# Patient Record
Sex: FEMALE | Race: WHITE | NOT HISPANIC OR LATINO | URBAN - METROPOLITAN AREA
[De-identification: names, ages, dates, MRNs, and addresses within clinical notes are randomized per-mention and may not be internally consistent; named-entity substitution may affect disease eponyms.]

---

## 2017-01-03 ENCOUNTER — CHARTING TRANS (OUTPATIENT)
Dept: URGENT CARE | Age: 57
End: 2017-01-03

## 2017-01-03 ENCOUNTER — MYAURORA ACCOUNT LINK (OUTPATIENT)
Dept: OTHER | Age: 57
End: 2017-01-03

## 2017-01-05 ENCOUNTER — CHARTING TRANS (OUTPATIENT)
Dept: INTERNAL MEDICINE | Age: 57
End: 2017-01-05

## 2017-01-05 ENCOUNTER — MYAURORA ACCOUNT LINK (OUTPATIENT)
Dept: OTHER | Age: 57
End: 2017-01-05

## 2017-01-05 ENCOUNTER — CHARTING TRANS (OUTPATIENT)
Dept: OTHER | Age: 57
End: 2017-01-05

## 2017-01-23 ENCOUNTER — CHARTING TRANS (OUTPATIENT)
Dept: OTHER | Age: 57
End: 2017-01-23

## 2017-01-24 ENCOUNTER — CHARTING TRANS (OUTPATIENT)
Dept: INTERNAL MEDICINE | Age: 57
End: 2017-01-24

## 2017-01-25 ENCOUNTER — CHARTING TRANS (OUTPATIENT)
Dept: OTHER | Age: 57
End: 2017-01-25

## 2017-02-21 ENCOUNTER — CHARTING TRANS (OUTPATIENT)
Dept: OTHER | Age: 57
End: 2017-02-21

## 2017-02-23 ENCOUNTER — CHARTING TRANS (OUTPATIENT)
Dept: OTHER | Age: 57
End: 2017-02-23

## 2017-02-27 ENCOUNTER — CHARTING TRANS (OUTPATIENT)
Dept: OTHER | Age: 57
End: 2017-02-27

## 2017-03-02 ENCOUNTER — CHARTING TRANS (OUTPATIENT)
Dept: OTHER | Age: 57
End: 2017-03-02

## 2017-03-03 ENCOUNTER — CHARTING TRANS (OUTPATIENT)
Dept: OTHER | Age: 57
End: 2017-03-03

## 2017-03-07 ENCOUNTER — CHARTING TRANS (OUTPATIENT)
Dept: OTHER | Age: 57
End: 2017-03-07

## 2017-03-07 ENCOUNTER — MYAURORA ACCOUNT LINK (OUTPATIENT)
Dept: OTHER | Age: 57
End: 2017-03-07

## 2017-03-08 ENCOUNTER — CHARTING TRANS (OUTPATIENT)
Dept: OTHER | Age: 57
End: 2017-03-08

## 2017-03-10 ENCOUNTER — CHARTING TRANS (OUTPATIENT)
Dept: ORTHOPEDICS | Age: 57
End: 2017-03-10

## 2017-03-10 ENCOUNTER — IMAGING SERVICES (OUTPATIENT)
Dept: OTHER | Age: 57
End: 2017-03-10

## 2017-03-15 ENCOUNTER — CHARTING TRANS (OUTPATIENT)
Dept: INTERNAL MEDICINE | Age: 57
End: 2017-03-15

## 2017-03-15 ENCOUNTER — MYAURORA ACCOUNT LINK (OUTPATIENT)
Dept: OTHER | Age: 57
End: 2017-03-15

## 2017-03-19 ENCOUNTER — IMAGING SERVICES (OUTPATIENT)
Dept: OTHER | Age: 57
End: 2017-03-19

## 2017-03-19 ENCOUNTER — CHARTING TRANS (OUTPATIENT)
Dept: OTHER | Age: 57
End: 2017-03-19

## 2017-03-27 ENCOUNTER — MYAURORA ACCOUNT LINK (OUTPATIENT)
Dept: OTHER | Age: 57
End: 2017-03-27

## 2017-03-27 ENCOUNTER — CHARTING TRANS (OUTPATIENT)
Dept: URGENT CARE | Age: 57
End: 2017-03-27

## 2017-03-27 ASSESSMENT — PAIN SCALES - GENERAL: PAINLEVEL_OUTOF10: 0

## 2017-04-05 ENCOUNTER — CHARTING TRANS (OUTPATIENT)
Dept: ORTHOPEDICS | Age: 57
End: 2017-04-05

## 2017-04-05 ENCOUNTER — MYAURORA ACCOUNT LINK (OUTPATIENT)
Dept: OTHER | Age: 57
End: 2017-04-05

## 2017-04-21 ENCOUNTER — CHARTING TRANS (OUTPATIENT)
Dept: OTHER | Age: 57
End: 2017-04-21

## 2017-04-22 ENCOUNTER — LAB SERVICES (OUTPATIENT)
Dept: OTHER | Age: 57
End: 2017-04-22

## 2017-04-22 LAB
BUN SERPL-MCNC: 14 MG/DL (ref 7–20)
CALCIUM SERPL-MCNC: 10.2 MG/DL (ref 8.6–10.6)
CHLORIDE SERPL-SCNC: 99 MMOL/L (ref 96–107)
CREATININE, SERUM: 0.5 MG/DL (ref 0.5–1.4)
EST. AVERAGE GLUCOSE BLD GHB EST-MCNC: 310 MG/DL (ref 0–154)
GFR SERPL CREATININE-BSD FRML MDRD: >60 ML/MIN/{1.73M2}
GFR SERPL CREATININE-BSD FRML MDRD: >60 ML/MIN/{1.73M2}
GLUCOSE P FAST SERPL-MCNC: 357 MG/DL (ref 60–100)
HBA1C MFR BLD: 12.4 % (ref 4.2–6)
HCO3 SERPL-SCNC: 24 MMOL/L (ref 22–32)
POTASSIUM SERPL-SCNC: 4.8 MMOL/L (ref 3.5–5.3)
SODIUM SERPL-SCNC: 135 MMOL/L (ref 136–146)

## 2017-04-24 ENCOUNTER — CHARTING TRANS (OUTPATIENT)
Dept: OTHER | Age: 57
End: 2017-04-24

## 2017-05-10 ENCOUNTER — CHARTING TRANS (OUTPATIENT)
Dept: OTHER | Age: 57
End: 2017-05-10

## 2017-05-13 ENCOUNTER — CHARTING TRANS (OUTPATIENT)
Dept: OTHER | Age: 57
End: 2017-05-13

## 2017-05-15 ENCOUNTER — CHARTING TRANS (OUTPATIENT)
Dept: OTHER | Age: 57
End: 2017-05-15

## 2017-05-15 ENCOUNTER — MYAURORA ACCOUNT LINK (OUTPATIENT)
Dept: OTHER | Age: 57
End: 2017-05-15

## 2017-05-16 ENCOUNTER — CHARTING TRANS (OUTPATIENT)
Dept: OTHER | Age: 57
End: 2017-05-16

## 2017-05-16 ENCOUNTER — MYAURORA ACCOUNT LINK (OUTPATIENT)
Dept: OTHER | Age: 57
End: 2017-05-16

## 2017-05-19 ENCOUNTER — CHARTING TRANS (OUTPATIENT)
Dept: OTHER | Age: 57
End: 2017-05-19

## 2017-05-27 ENCOUNTER — LAB SERVICES (OUTPATIENT)
Dept: OTHER | Age: 57
End: 2017-05-27

## 2017-05-27 LAB
GLUCOSE P FAST SERPL-MCNC: 163 MG/DL (ref 60–100)
MAGNESIUM SERPL-MCNC: 1.5 MG/DL (ref 1.6–2.6)

## 2017-06-03 ENCOUNTER — CHARTING TRANS (OUTPATIENT)
Dept: OTHER | Age: 57
End: 2017-06-03

## 2017-06-13 ENCOUNTER — CHARTING TRANS (OUTPATIENT)
Dept: OTHER | Age: 57
End: 2017-06-13

## 2017-07-17 ENCOUNTER — CHARTING TRANS (OUTPATIENT)
Dept: OTHER | Age: 57
End: 2017-07-17

## 2017-07-25 ENCOUNTER — CHARTING TRANS (OUTPATIENT)
Dept: OTHER | Age: 57
End: 2017-07-25

## 2017-07-26 ENCOUNTER — CHARTING TRANS (OUTPATIENT)
Dept: OTHER | Age: 57
End: 2017-07-26

## 2017-08-17 ENCOUNTER — CHARTING TRANS (OUTPATIENT)
Dept: OTHER | Age: 57
End: 2017-08-17

## 2017-08-21 ENCOUNTER — CHARTING TRANS (OUTPATIENT)
Dept: OTHER | Age: 57
End: 2017-08-21

## 2017-09-01 ENCOUNTER — CHARTING TRANS (OUTPATIENT)
Dept: OTHER | Age: 57
End: 2017-09-01

## 2017-09-05 ENCOUNTER — IMAGING SERVICES (OUTPATIENT)
Dept: OTHER | Age: 57
End: 2017-09-05

## 2017-09-05 ENCOUNTER — CHARTING TRANS (OUTPATIENT)
Dept: INTERNAL MEDICINE | Age: 57
End: 2017-09-05

## 2017-09-06 ENCOUNTER — CHARTING TRANS (OUTPATIENT)
Dept: OTHER | Age: 57
End: 2017-09-06

## 2017-09-09 ENCOUNTER — LAB SERVICES (OUTPATIENT)
Dept: OTHER | Age: 57
End: 2017-09-09

## 2017-09-09 LAB
BUN SERPL-MCNC: 14 MG/DL (ref 7–20)
CALCIUM SERPL-MCNC: 9.1 MG/DL (ref 8.6–10.6)
CHLORIDE SERPL-SCNC: 107 MMOL/L (ref 96–107)
CREATININE, SERUM: 0.6 MG/DL (ref 0.5–1.4)
EST. AVERAGE GLUCOSE BLD GHB EST-MCNC: 176 MG/DL (ref 0–154)
GFR SERPL CREATININE-BSD FRML MDRD: >60 ML/MIN/{1.73M2}
GFR SERPL CREATININE-BSD FRML MDRD: >60 ML/MIN/{1.73M2}
GLUCOSE P FAST SERPL-MCNC: 157 MG/DL (ref 60–100)
HBA1C MFR BLD: 7.8 % (ref 4.2–6)
HCO3 SERPL-SCNC: 24 MMOL/L (ref 22–32)
MAGNESIUM SERPL-MCNC: 1.5 MG/DL (ref 1.6–2.6)
POTASSIUM SERPL-SCNC: 4.5 MMOL/L (ref 3.5–5.3)
SODIUM SERPL-SCNC: 143 MMOL/L (ref 136–146)

## 2017-09-12 ENCOUNTER — CHARTING TRANS (OUTPATIENT)
Dept: OTHER | Age: 57
End: 2017-09-12

## 2017-09-16 ENCOUNTER — CHARTING TRANS (OUTPATIENT)
Dept: OTHER | Age: 57
End: 2017-09-16

## 2017-09-18 ENCOUNTER — CHARTING TRANS (OUTPATIENT)
Dept: URGENT CARE | Age: 57
End: 2017-09-18

## 2017-09-18 ASSESSMENT — PAIN SCALES - GENERAL: PAINLEVEL_OUTOF10: 5

## 2017-09-20 ENCOUNTER — CHARTING TRANS (OUTPATIENT)
Dept: OTHER | Age: 57
End: 2017-09-20

## 2017-09-21 ENCOUNTER — CHARTING TRANS (OUTPATIENT)
Dept: OTHER | Age: 57
End: 2017-09-21

## 2017-09-22 ENCOUNTER — CHARTING TRANS (OUTPATIENT)
Dept: OTHER | Age: 57
End: 2017-09-22

## 2017-10-03 ENCOUNTER — CHARTING TRANS (OUTPATIENT)
Dept: OTHER | Age: 57
End: 2017-10-03

## 2017-10-04 ENCOUNTER — CHARTING TRANS (OUTPATIENT)
Dept: OTHER | Age: 57
End: 2017-10-04

## 2017-10-06 ENCOUNTER — IMAGING SERVICES (OUTPATIENT)
Dept: OTHER | Age: 57
End: 2017-10-06

## 2017-10-09 ENCOUNTER — CHARTING TRANS (OUTPATIENT)
Dept: OTHER | Age: 57
End: 2017-10-09

## 2017-10-11 ENCOUNTER — CHARTING TRANS (OUTPATIENT)
Dept: OTHER | Age: 57
End: 2017-10-11

## 2017-10-17 ENCOUNTER — IMAGING SERVICES (OUTPATIENT)
Dept: OTHER | Age: 57
End: 2017-10-17

## 2017-10-17 ENCOUNTER — CHARTING TRANS (OUTPATIENT)
Dept: OTHER | Age: 57
End: 2017-10-17

## 2017-10-19 ENCOUNTER — CHARTING TRANS (OUTPATIENT)
Dept: INTERNAL MEDICINE | Age: 57
End: 2017-10-19

## 2017-10-19 ENCOUNTER — MYAURORA ACCOUNT LINK (OUTPATIENT)
Dept: OTHER | Age: 57
End: 2017-10-19

## 2017-11-13 ENCOUNTER — CHARTING TRANS (OUTPATIENT)
Dept: INTERNAL MEDICINE | Age: 57
End: 2017-11-13

## 2017-11-13 ENCOUNTER — IMAGING SERVICES (OUTPATIENT)
Dept: OTHER | Age: 57
End: 2017-11-13

## 2017-11-13 ENCOUNTER — CHARTING TRANS (OUTPATIENT)
Dept: OTHER | Age: 57
End: 2017-11-13

## 2017-11-13 ENCOUNTER — MYAURORA ACCOUNT LINK (OUTPATIENT)
Dept: OTHER | Age: 57
End: 2017-11-13

## 2017-11-14 ENCOUNTER — CHARTING TRANS (OUTPATIENT)
Dept: OTHER | Age: 57
End: 2017-11-14

## 2017-11-16 ENCOUNTER — CHARTING TRANS (OUTPATIENT)
Dept: PAIN MANAGEMENT | Age: 57
End: 2017-11-16

## 2017-11-16 ENCOUNTER — MYAURORA ACCOUNT LINK (OUTPATIENT)
Dept: OTHER | Age: 57
End: 2017-11-16

## 2017-11-17 ENCOUNTER — CHARTING TRANS (OUTPATIENT)
Dept: OTHER | Age: 57
End: 2017-11-17

## 2017-12-07 ENCOUNTER — CHARTING TRANS (OUTPATIENT)
Dept: OTHER | Age: 57
End: 2017-12-07

## 2017-12-19 ENCOUNTER — CHARTING TRANS (OUTPATIENT)
Dept: OTHER | Age: 57
End: 2017-12-19

## 2018-01-16 ENCOUNTER — LAB SERVICES (OUTPATIENT)
Dept: OTHER | Age: 58
End: 2018-01-16

## 2018-01-16 ENCOUNTER — BH HISTORICAL (OUTPATIENT)
Dept: OTHER | Age: 58
End: 2018-01-16

## 2018-01-16 LAB
ALBUMIN/CREAT UR: 28.4 MG/G (ref 0–30)
BUN SERPL-MCNC: 16 MG/DL (ref 7–20)
CALCIUM SERPL-MCNC: 9.9 MG/DL (ref 8.6–10.6)
CHLORIDE SERPL-SCNC: 104 MMOL/L (ref 96–107)
CREAT UR-MCNC: 331.2 MG/DL
CREATININE, SERUM: 0.7 MG/DL (ref 0.5–1.4)
EST. AVERAGE GLUCOSE BLD GHB EST-MCNC: 186 MG/DL (ref 0–154)
GFR SERPL CREATININE-BSD FRML MDRD: >60 ML/MIN/{1.73M2}
GFR SERPL CREATININE-BSD FRML MDRD: >60 ML/MIN/{1.73M2}
GLUCOSE P FAST SERPL-MCNC: 229 MG/DL (ref 60–100)
HBA1C MFR BLD: 8.1 % (ref 4.2–6)
HCO3 SERPL-SCNC: 25 MMOL/L (ref 22–32)
MAGNESIUM SERPL-MCNC: 1.5 MG/DL (ref 1.6–2.6)
MICROALBUMIN UR-MCNC: 94.1 MG/L
POTASSIUM SERPL-SCNC: 4.3 MMOL/L (ref 3.5–5.3)
SODIUM SERPL-SCNC: 140 MMOL/L (ref 136–146)

## 2018-01-18 ENCOUNTER — CHARTING TRANS (OUTPATIENT)
Dept: OTHER | Age: 58
End: 2018-01-18

## 2018-01-22 ENCOUNTER — CHARTING TRANS (OUTPATIENT)
Dept: OTHER | Age: 58
End: 2018-01-22

## 2018-01-22 ENCOUNTER — LAB SERVICES (OUTPATIENT)
Dept: OTHER | Age: 58
End: 2018-01-22

## 2018-01-22 LAB
INFLUENZA TYPE A ANTIGEN: POSITIVE
INFLUENZA TYPE B ANTIGEN: NEGATIVE

## 2018-01-22 ASSESSMENT — PAIN SCALES - GENERAL: PAINLEVEL_OUTOF10: 8

## 2018-02-20 ENCOUNTER — BH HISTORICAL (OUTPATIENT)
Dept: OTHER | Age: 58
End: 2018-02-20

## 2018-03-27 ENCOUNTER — CHARTING TRANS (OUTPATIENT)
Dept: OTHER | Age: 58
End: 2018-03-27

## 2018-04-02 ENCOUNTER — MYAURORA ACCOUNT LINK (OUTPATIENT)
Dept: OTHER | Age: 58
End: 2018-04-02

## 2018-04-03 ENCOUNTER — BH HISTORICAL (OUTPATIENT)
Dept: OTHER | Age: 58
End: 2018-04-03

## 2018-04-10 ENCOUNTER — BH HISTORICAL (OUTPATIENT)
Dept: OTHER | Age: 58
End: 2018-04-10

## 2018-04-19 ENCOUNTER — CHARTING TRANS (OUTPATIENT)
Dept: OTHER | Age: 58
End: 2018-04-19

## 2018-04-20 ENCOUNTER — LAB SERVICES (OUTPATIENT)
Dept: OTHER | Age: 58
End: 2018-04-20

## 2018-04-20 ENCOUNTER — CHARTING TRANS (OUTPATIENT)
Dept: OTHER | Age: 58
End: 2018-04-20

## 2018-04-20 ENCOUNTER — MYAURORA ACCOUNT LINK (OUTPATIENT)
Dept: OTHER | Age: 58
End: 2018-04-20

## 2018-04-20 LAB
BILIRUBIN URINE: NEGATIVE
BLOOD URINE: NEGATIVE
CLARITY: ABNORMAL
COLOR: YELLOW
GLUCOSE QUALITATIVE U: >=500
KETONES, URINE: NEGATIVE
LEUKOCYTE ESTERASE URINE: ABNORMAL
NITRITE URINE: NEGATIVE
PH URINE: 5 (ref 5–7)
SPECIFIC GRAVITY URINE: 1.02 (ref 1–1.03)
URINE PROTEIN, QUAL (DIPSTICK): 30
UROBILINOGEN URINE: <2

## 2018-04-22 ENCOUNTER — MYAURORA ACCOUNT LINK (OUTPATIENT)
Dept: OTHER | Age: 58
End: 2018-04-22

## 2018-04-22 ENCOUNTER — CHARTING TRANS (OUTPATIENT)
Dept: OTHER | Age: 58
End: 2018-04-22

## 2018-04-22 ASSESSMENT — PAIN SCALES - GENERAL: PAINLEVEL_OUTOF10: 10

## 2018-04-23 ENCOUNTER — CHARTING TRANS (OUTPATIENT)
Dept: OTHER | Age: 58
End: 2018-04-23

## 2018-04-23 LAB — FINAL REPORT: ABNORMAL

## 2018-04-24 ENCOUNTER — LAB SERVICES (OUTPATIENT)
Dept: OTHER | Age: 58
End: 2018-04-24

## 2018-04-24 ENCOUNTER — CHARTING TRANS (OUTPATIENT)
Dept: OTHER | Age: 58
End: 2018-04-24

## 2018-04-24 LAB
BASOPHIL AUTOMATED: 0.8 %
BASOPHILS: 0.1 (ref 0–0.2)
EOSINOPHIL AUTOMATED: 4.5 %
EOSINOPHILS: 0.3 (ref 0–0.5)
HEMATOCRIT: 39.1 % (ref 34.7–45.1)
HEMOGLOBIN: 13.4 GM/DL (ref 12–15.3)
INTERNATIONAL NORMAL: 1 (ref 0.8–1.1)
LYMPHOCYTE AUTOMATED: 41.4 %
LYMPHOCYTES: 3.1 (ref 0.6–3.4)
MEAN CORPUSCULAR HGB: 28.6 PG (ref 26–34)
MEAN CORPUSCULAR HGB: 34.4 G/DL (ref 32.5–35.8)
MEAN CORPUSCULAR VOL: 83.2 FL (ref 80–100)
MEAN PLATELET VOLUME: 8.2 FL (ref 6.8–10.2)
MONOCYTE AUTOMATED: 8.2 %
MONOCYTES: 0.6 (ref 0.3–1)
NEUTROPHIL ABSOLUTE: 3.4 (ref 1.7–7.7)
NEUTROPHIL AUTOMATED: 45.1 %
PLATELET COUNT: 210 K/MM3 (ref 150–450)
PROTHROMBIN TIME (PATIENT): 11.5 SECONDS (ref 10.1–13.1)
PTT: 30 SECONDS (ref 25–36)
RED BLOOD CELL COUNT: 4.69 M/MM3 (ref 3.63–5.04)
RED CELL DISTRIBUTIO: 14.1 % (ref 11.9–15.9)
TROPONIN I (TROP): < 0.03 NG/ML
WHITE BLOOD CELL COU: 7.6 K/MM3 (ref 4–11)

## 2018-04-25 ENCOUNTER — CHARTING TRANS (OUTPATIENT)
Dept: OTHER | Age: 58
End: 2018-04-25

## 2018-04-27 ENCOUNTER — CHARTING TRANS (OUTPATIENT)
Dept: OTHER | Age: 58
End: 2018-04-27

## 2018-04-27 ENCOUNTER — MYAURORA ACCOUNT LINK (OUTPATIENT)
Dept: OTHER | Age: 58
End: 2018-04-27

## 2018-04-27 ENCOUNTER — LAB SERVICES (OUTPATIENT)
Dept: OTHER | Age: 58
End: 2018-04-27

## 2018-04-30 ENCOUNTER — CHARTING TRANS (OUTPATIENT)
Dept: OTHER | Age: 58
End: 2018-04-30

## 2018-05-03 ENCOUNTER — CHARTING TRANS (OUTPATIENT)
Dept: OTHER | Age: 58
End: 2018-05-03

## 2018-05-03 ENCOUNTER — LAB SERVICES (OUTPATIENT)
Dept: OTHER | Age: 58
End: 2018-05-03

## 2018-05-03 LAB — ACE SERPL-CCNC: 54 U/L

## 2018-05-04 LAB — TXT: NORMAL

## 2018-05-08 ENCOUNTER — CHARTING TRANS (OUTPATIENT)
Dept: OTHER | Age: 58
End: 2018-05-08

## 2018-05-09 ENCOUNTER — CHARTING TRANS (OUTPATIENT)
Dept: OTHER | Age: 58
End: 2018-05-09

## 2018-05-10 ENCOUNTER — CHARTING TRANS (OUTPATIENT)
Dept: OTHER | Age: 58
End: 2018-05-10

## 2018-05-10 ENCOUNTER — LAB SERVICES (OUTPATIENT)
Dept: OTHER | Age: 58
End: 2018-05-10

## 2018-05-10 LAB
ALBUMIN SERPL-MCNC: 4.3 G/DL (ref 3.6–5.1)
ALP SERPL-CCNC: 159 U/L (ref 45–105)
ALT SERPL-CCNC: 62 U/L (ref 15–43)
AST SERPL-CCNC: 35 U/L (ref 14–43)
BILIRUB SERPL-MCNC: 0.4 MG/DL (ref 0–1.3)
BUN SERPL-MCNC: 14 MG/DL (ref 7–20)
CALCIUM SERPL-MCNC: 10 MG/DL (ref 8.6–10.6)
CHLORIDE SERPL-SCNC: 102 MMOL/L (ref 96–107)
CO2 SERPL-SCNC: 27 MMOL/L (ref 22–32)
CREAT SERPL-MCNC: 0.7 MG/DL (ref 0.5–1.4)
EST. AVERAGE GLUCOSE BLD GHB EST-MCNC: 218 MG/DL (ref 0–154)
GFR SERPL CREATININE-BSD FRML MDRD: >60 ML/MIN/{1.73M2}
GFR SERPL CREATININE-BSD FRML MDRD: >60 ML/MIN/{1.73M2}
GLUCOSE SERPL-MCNC: 305 MG/DL (ref 70–200)
HBA1C BLD-MCNC: 9.2 % (ref 4.2–6)
MAGNESIUM SERPL-MCNC: 1.5 MG/DL (ref 1.6–2.6)
POTASSIUM SERPL-SCNC: 4.6 MMOL/L (ref 3.5–5.3)
PROT SERPL-MCNC: 7 G/DL (ref 6.4–8.5)
SODIUM SERPL-SCNC: 142 MMOL/L (ref 136–146)

## 2018-05-11 ENCOUNTER — CHARTING TRANS (OUTPATIENT)
Dept: OTHER | Age: 58
End: 2018-05-11

## 2018-05-14 ENCOUNTER — LAB SERVICES (OUTPATIENT)
Dept: OTHER | Age: 58
End: 2018-05-14

## 2018-05-17 LAB — TXT: NORMAL

## 2018-05-18 ENCOUNTER — CHARTING TRANS (OUTPATIENT)
Dept: OTHER | Age: 58
End: 2018-05-18

## 2018-05-18 ENCOUNTER — ANCILLARY ORDERS (OUTPATIENT)
Dept: OTHER | Age: 58
End: 2018-05-18

## 2018-05-18 DIAGNOSIS — D86.9 SARCOIDOSIS: ICD-10-CM

## 2018-05-18 DIAGNOSIS — R93.89 ABNORMAL FINDINGS ON DIAGNOSTIC IMAGING OF OTHER SPECIFIED BODY STRUCTURES: ICD-10-CM

## 2018-05-19 ENCOUNTER — BH HISTORICAL (OUTPATIENT)
Dept: OTHER | Age: 58
End: 2018-05-19

## 2018-05-21 ENCOUNTER — CHARTING TRANS (OUTPATIENT)
Dept: OTHER | Age: 58
End: 2018-05-21

## 2018-05-22 ENCOUNTER — CHARTING TRANS (OUTPATIENT)
Dept: OTHER | Age: 58
End: 2018-05-22

## 2018-05-23 ENCOUNTER — CHARTING TRANS (OUTPATIENT)
Dept: OTHER | Age: 58
End: 2018-05-23

## 2018-05-30 ENCOUNTER — CHARTING TRANS (OUTPATIENT)
Dept: OTHER | Age: 58
End: 2018-05-30

## 2018-05-31 ENCOUNTER — LAB SERVICES (OUTPATIENT)
Dept: OTHER | Age: 58
End: 2018-05-31

## 2018-05-31 ENCOUNTER — MYAURORA ACCOUNT LINK (OUTPATIENT)
Dept: OTHER | Age: 58
End: 2018-05-31

## 2018-05-31 LAB
ALBUMIN SERPL BCG-MCNC: 4.5 G/DL (ref 3.6–5.1)
ALP SERPL-CCNC: 82 U/L (ref 45–105)
ALT SERPL W/O P-5'-P-CCNC: 39 U/L (ref 7–34)
AST SERPL-CCNC: 19 U/L (ref 9–37)
BILIRUB SERPL-MCNC: 0.5 MG/DL (ref 0–1)
BUN SERPL-MCNC: 18 MG/DL (ref 7–20)
CALCIUM SERPL-MCNC: 10.3 MG/DL (ref 8.6–10.6)
CHLORIDE SERPL-SCNC: 99 MMOL/L (ref 96–107)
CREAT SERPL-MCNC: 0.6 MG/DL (ref 0.5–1.4)
GFR SERPL CREATININE-BSD FRML MDRD: >60 ML/MIN/{1.73M2}
GFR SERPL CREATININE-BSD FRML MDRD: >60 ML/MIN/{1.73M2}
GLUCOSE P FAST SERPL-MCNC: 165 MG/DL (ref 60–100)
HCO3 SERPL-SCNC: 26 MMOL/L (ref 22–32)
POTASSIUM SERPL-SCNC: 4 MMOL/L (ref 3.5–5.3)
PROT SERPL-MCNC: 6.8 G/DL (ref 6.2–8.1)
SODIUM SERPL-SCNC: 137 MMOL/L (ref 136–146)

## 2018-06-02 ENCOUNTER — CHARTING TRANS (OUTPATIENT)
Dept: OTHER | Age: 58
End: 2018-06-02

## 2018-06-05 ENCOUNTER — CHARTING TRANS (OUTPATIENT)
Dept: OTHER | Age: 58
End: 2018-06-05

## 2018-06-13 LAB — CULTURE FUNGAL WITH STAIN: NORMAL

## 2018-06-17 ENCOUNTER — CHARTING TRANS (OUTPATIENT)
Dept: OTHER | Age: 58
End: 2018-06-17

## 2018-06-19 ENCOUNTER — MYAURORA ACCOUNT LINK (OUTPATIENT)
Dept: OTHER | Age: 58
End: 2018-06-19

## 2018-06-19 ENCOUNTER — CHARTING TRANS (OUTPATIENT)
Dept: OTHER | Age: 58
End: 2018-06-19

## 2018-06-21 ENCOUNTER — CHARTING TRANS (OUTPATIENT)
Dept: OTHER | Age: 58
End: 2018-06-21

## 2018-07-02 ENCOUNTER — CHARTING TRANS (OUTPATIENT)
Dept: OTHER | Age: 58
End: 2018-07-02

## 2018-07-03 ENCOUNTER — CHARTING TRANS (OUTPATIENT)
Dept: OTHER | Age: 58
End: 2018-07-03

## 2018-07-06 ENCOUNTER — CHARTING TRANS (OUTPATIENT)
Dept: OTHER | Age: 58
End: 2018-07-06

## 2018-07-10 LAB — CULTURE AFB WITH STAIN: NORMAL

## 2018-07-16 ENCOUNTER — CHARTING TRANS (OUTPATIENT)
Dept: OTHER | Age: 58
End: 2018-07-16

## 2018-08-30 ENCOUNTER — CHARTING TRANS (OUTPATIENT)
Dept: OTHER | Age: 58
End: 2018-08-30

## 2018-11-23 ENCOUNTER — IMAGING SERVICES (OUTPATIENT)
Dept: OTHER | Age: 58
End: 2018-11-23

## 2018-11-27 VITALS — HEART RATE: 72 BPM | SYSTOLIC BLOOD PRESSURE: 144 MMHG | WEIGHT: 244 LBS | DIASTOLIC BLOOD PRESSURE: 72 MMHG

## 2018-11-27 VITALS — WEIGHT: 241 LBS | BODY MASS INDEX: 40.15 KG/M2 | HEIGHT: 65 IN

## 2018-11-28 VITALS
OXYGEN SATURATION: 96 % | WEIGHT: 245 LBS | DIASTOLIC BLOOD PRESSURE: 68 MMHG | SYSTOLIC BLOOD PRESSURE: 118 MMHG | HEART RATE: 76 BPM

## 2018-11-28 VITALS
DIASTOLIC BLOOD PRESSURE: 78 MMHG | OXYGEN SATURATION: 97 % | WEIGHT: 240 LBS | HEART RATE: 72 BPM | SYSTOLIC BLOOD PRESSURE: 140 MMHG | TEMPERATURE: 97.2 F

## 2018-11-28 VITALS
TEMPERATURE: 98.4 F | SYSTOLIC BLOOD PRESSURE: 138 MMHG | HEART RATE: 81 BPM | OXYGEN SATURATION: 98 % | DIASTOLIC BLOOD PRESSURE: 62 MMHG

## 2018-11-28 VITALS — WEIGHT: 247 LBS | DIASTOLIC BLOOD PRESSURE: 66 MMHG | SYSTOLIC BLOOD PRESSURE: 138 MMHG | HEART RATE: 60 BPM

## 2018-11-28 VITALS
RESPIRATION RATE: 20 BRPM | HEART RATE: 80 BPM | OXYGEN SATURATION: 97 % | SYSTOLIC BLOOD PRESSURE: 160 MMHG | DIASTOLIC BLOOD PRESSURE: 60 MMHG | TEMPERATURE: 99.3 F

## 2018-11-28 VITALS — BODY MASS INDEX: 40.29 KG/M2 | HEIGHT: 64 IN | WEIGHT: 236 LBS

## 2018-11-28 VITALS
OXYGEN SATURATION: 97 % | HEART RATE: 72 BPM | SYSTOLIC BLOOD PRESSURE: 132 MMHG | RESPIRATION RATE: 16 BRPM | TEMPERATURE: 97.8 F | DIASTOLIC BLOOD PRESSURE: 70 MMHG

## 2018-11-28 VITALS
OXYGEN SATURATION: 98 % | HEART RATE: 76 BPM | WEIGHT: 246 LBS | DIASTOLIC BLOOD PRESSURE: 68 MMHG | SYSTOLIC BLOOD PRESSURE: 122 MMHG

## 2018-11-28 VITALS
DIASTOLIC BLOOD PRESSURE: 70 MMHG | HEART RATE: 80 BPM | WEIGHT: 237 LBS | BODY MASS INDEX: 40.68 KG/M2 | OXYGEN SATURATION: 98 % | SYSTOLIC BLOOD PRESSURE: 126 MMHG

## 2018-11-28 VITALS — HEIGHT: 64 IN | BODY MASS INDEX: 42.68 KG/M2 | WEIGHT: 250 LBS

## 2018-11-29 VITALS
RESPIRATION RATE: 14 BRPM | TEMPERATURE: 98.9 F | HEART RATE: 90 BPM | SYSTOLIC BLOOD PRESSURE: 152 MMHG | OXYGEN SATURATION: 95 % | DIASTOLIC BLOOD PRESSURE: 60 MMHG

## 2018-11-29 VITALS
WEIGHT: 238 LBS | HEART RATE: 71 BPM | TEMPERATURE: 98.2 F | DIASTOLIC BLOOD PRESSURE: 80 MMHG | OXYGEN SATURATION: 98 % | SYSTOLIC BLOOD PRESSURE: 148 MMHG

## 2018-11-29 VITALS
HEART RATE: 72 BPM | DIASTOLIC BLOOD PRESSURE: 54 MMHG | WEIGHT: 247 LBS | OXYGEN SATURATION: 96 % | SYSTOLIC BLOOD PRESSURE: 114 MMHG

## 2019-01-14 ENCOUNTER — DOCUMENTATION (OUTPATIENT)
Dept: OPHTHALMOLOGY | Age: 59
End: 2019-01-14

## 2019-01-24 ENCOUNTER — IMAGING SERVICES (OUTPATIENT)
Dept: OTHER | Age: 59
End: 2019-01-24

## 2019-02-25 ENCOUNTER — TELEPHONE (OUTPATIENT)
Dept: INTERNAL MEDICINE | Age: 59
End: 2019-02-25

## 2019-03-06 VITALS
DIASTOLIC BLOOD PRESSURE: 60 MMHG | WEIGHT: 238 LBS | HEART RATE: 76 BPM | BODY MASS INDEX: 40.22 KG/M2 | OXYGEN SATURATION: 98 % | SYSTOLIC BLOOD PRESSURE: 140 MMHG

## 2019-03-06 VITALS
RESPIRATION RATE: 18 BRPM | HEART RATE: 68 BPM | TEMPERATURE: 98.9 F | DIASTOLIC BLOOD PRESSURE: 64 MMHG | SYSTOLIC BLOOD PRESSURE: 148 MMHG

## 2019-03-06 VITALS
HEART RATE: 76 BPM | OXYGEN SATURATION: 97 % | BODY MASS INDEX: 40.82 KG/M2 | DIASTOLIC BLOOD PRESSURE: 70 MMHG | SYSTOLIC BLOOD PRESSURE: 162 MMHG | HEIGHT: 65 IN | WEIGHT: 245 LBS

## 2019-03-06 VITALS
HEART RATE: 82 BPM | DIASTOLIC BLOOD PRESSURE: 58 MMHG | WEIGHT: 230 LBS | SYSTOLIC BLOOD PRESSURE: 124 MMHG | BODY MASS INDEX: 39.48 KG/M2 | OXYGEN SATURATION: 97 %

## 2019-03-06 VITALS
DIASTOLIC BLOOD PRESSURE: 74 MMHG | TEMPERATURE: 98.1 F | HEART RATE: 70 BPM | OXYGEN SATURATION: 97 % | RESPIRATION RATE: 16 BRPM | SYSTOLIC BLOOD PRESSURE: 152 MMHG

## 2019-03-06 VITALS
BODY MASS INDEX: 40.1 KG/M2 | OXYGEN SATURATION: 97 % | HEART RATE: 81 BPM | SYSTOLIC BLOOD PRESSURE: 146 MMHG | WEIGHT: 241 LBS | DIASTOLIC BLOOD PRESSURE: 60 MMHG

## 2019-03-06 VITALS
SYSTOLIC BLOOD PRESSURE: 144 MMHG | BODY MASS INDEX: 39.61 KG/M2 | WEIGHT: 232 LBS | DIASTOLIC BLOOD PRESSURE: 72 MMHG | HEIGHT: 64 IN | TEMPERATURE: 97.2 F

## 2019-03-06 VITALS
HEART RATE: 76 BPM | SYSTOLIC BLOOD PRESSURE: 138 MMHG | HEIGHT: 65 IN | BODY MASS INDEX: 41.65 KG/M2 | WEIGHT: 250 LBS | DIASTOLIC BLOOD PRESSURE: 72 MMHG | OXYGEN SATURATION: 96 %

## 2019-03-06 VITALS
HEART RATE: 69 BPM | OXYGEN SATURATION: 98 % | SYSTOLIC BLOOD PRESSURE: 168 MMHG | DIASTOLIC BLOOD PRESSURE: 82 MMHG | RESPIRATION RATE: 18 BRPM | TEMPERATURE: 97.1 F

## 2019-03-06 VITALS
HEART RATE: 70 BPM | DIASTOLIC BLOOD PRESSURE: 60 MMHG | SYSTOLIC BLOOD PRESSURE: 142 MMHG | WEIGHT: 232 LBS | OXYGEN SATURATION: 97 % | BODY MASS INDEX: 39.21 KG/M2

## 2019-10-23 ENCOUNTER — TELEPHONE (OUTPATIENT)
Dept: FAMILY MEDICINE CLINIC | Facility: CLINIC | Age: 59
End: 2019-10-23

## 2019-10-23 NOTE — TELEPHONE ENCOUNTER
Pt would like to know if TJ would take her as a patient. Her son Jose G Arango and wife Deena Baer and kids see Yeison Christopher. She said that all talk so high of Tj  She is going to have a [facility] and would like to pick TJ.    Please return call to [phone]

## 2019-10-23 NOTE — TELEPHONE ENCOUNTER
Called and informed Pt that [facility] will see her,.  She is going to set up her Insurance and call back to set up an appt

## 2021-01-07 ENCOUNTER — OFFICE VISIT (OUTPATIENT)
Dept: FAMILY MEDICINE CLINIC | Facility: CLINIC | Age: 61
End: 2021-01-07
Payer: COMMERCIAL

## 2021-01-07 VITALS
DIASTOLIC BLOOD PRESSURE: 72 MMHG | RESPIRATION RATE: 17 BRPM | HEIGHT: 63.5 IN | OXYGEN SATURATION: 97 % | SYSTOLIC BLOOD PRESSURE: 122 MMHG | TEMPERATURE: 97 F | WEIGHT: 236 LBS | BODY MASS INDEX: 41.3 KG/M2 | HEART RATE: 64 BPM

## 2021-01-07 DIAGNOSIS — K21.9 GASTROESOPHAGEAL REFLUX DISEASE, UNSPECIFIED WHETHER ESOPHAGITIS PRESENT: ICD-10-CM

## 2021-01-07 DIAGNOSIS — I10 HYPERTENSION, UNSPECIFIED TYPE: ICD-10-CM

## 2021-01-07 DIAGNOSIS — E78.5 HYPERLIPIDEMIA, UNSPECIFIED HYPERLIPIDEMIA TYPE: ICD-10-CM

## 2021-01-07 DIAGNOSIS — Z12.31 ENCOUNTER FOR SCREENING MAMMOGRAM FOR MALIGNANT NEOPLASM OF BREAST: ICD-10-CM

## 2021-01-07 DIAGNOSIS — F41.9 ANXIETY: ICD-10-CM

## 2021-01-07 DIAGNOSIS — Z00.00 WELL ADULT EXAM: Primary | ICD-10-CM

## 2021-01-07 DIAGNOSIS — K76.0 FATTY LIVER: ICD-10-CM

## 2021-01-07 DIAGNOSIS — E11.9 TYPE 2 DIABETES MELLITUS WITHOUT COMPLICATION, WITHOUT LONG-TERM CURRENT USE OF INSULIN (HCC): ICD-10-CM

## 2021-01-07 DIAGNOSIS — D86.9 SARCOID: ICD-10-CM

## 2021-01-07 DIAGNOSIS — E03.9 HYPOTHYROIDISM, UNSPECIFIED TYPE: ICD-10-CM

## 2021-01-07 PROCEDURE — 99213 OFFICE O/P EST LOW 20 MIN: CPT | Performed by: FAMILY MEDICINE

## 2021-01-07 PROCEDURE — G0438 PPPS, INITIAL VISIT: HCPCS | Performed by: FAMILY MEDICINE

## 2021-01-07 PROCEDURE — 3074F SYST BP LT 130 MM HG: CPT | Performed by: FAMILY MEDICINE

## 2021-01-07 PROCEDURE — 3078F DIAST BP <80 MM HG: CPT | Performed by: FAMILY MEDICINE

## 2021-01-07 PROCEDURE — 3008F BODY MASS INDEX DOCD: CPT | Performed by: FAMILY MEDICINE

## 2021-01-07 PROCEDURE — 99386 PREV VISIT NEW AGE 40-64: CPT | Performed by: FAMILY MEDICINE

## 2021-01-07 RX ORDER — CETIRIZINE HYDROCHLORIDE 10 MG/1
10 TABLET ORAL DAILY
COMMUNITY

## 2021-01-07 RX ORDER — CITALOPRAM 20 MG/1
TABLET ORAL
COMMUNITY
Start: 2020-12-06 | End: 2021-05-20

## 2021-01-07 RX ORDER — ATORVASTATIN CALCIUM 40 MG/1
40 TABLET, FILM COATED ORAL NIGHTLY
COMMUNITY
Start: 2020-03-02 | End: 2021-10-05

## 2021-01-07 RX ORDER — METFORMIN HYDROCHLORIDE 500 MG/1
TABLET, EXTENDED RELEASE ORAL
COMMUNITY
Start: 2020-11-02 | End: 2021-01-22

## 2021-01-07 RX ORDER — LOSARTAN POTASSIUM 25 MG/1
25 TABLET ORAL DAILY
COMMUNITY
Start: 2020-11-21 | End: 2021-10-07

## 2021-01-07 RX ORDER — NYSTATIN 100000 U/G
CREAM TOPICAL
COMMUNITY
Start: 2020-12-23 | End: 2021-01-07

## 2021-01-07 RX ORDER — OMEPRAZOLE 20 MG/1
20 CAPSULE, DELAYED RELEASE ORAL
COMMUNITY
Start: 2021-01-04 | End: 2021-10-05

## 2021-01-07 RX ORDER — LEVOTHYROXINE SODIUM 88 UG/1
88 TABLET ORAL DAILY
COMMUNITY
Start: 2020-12-04 | End: 2021-10-05

## 2021-01-07 RX ORDER — BLOOD SUGAR DIAGNOSTIC
STRIP MISCELLANEOUS
COMMUNITY
Start: 2020-05-26

## 2021-01-07 RX ORDER — NYSTATIN 100000 U/G
OINTMENT TOPICAL 2 TIMES DAILY
Refills: 0 | COMMUNITY
End: 2021-05-20

## 2021-01-07 NOTE — PROGRESS NOTES
Aidee Chacon is a 62year old female.     CC:  Patient presents with:  Establish Care: per pt      HPI:  New pt, here to establish care:    Patient is here for yearly, wellness exam  Last Lipid:  Lipid PanelResulted: 2/29/2020 2:06 AM  Candice Lou reformatted post processed images. Automated exposure control (AEC) was utilized for this study. Comparison: 6/30/2020, 3/7/2019, 11/14/2017. Findings:  The thyroid gland is normal. No axillary, hilar, or mediastinal pathologic lymphadenopathy by C 20 MG Oral Capsule Delayed Release Take 20 mg by mouth every morning before breakfast.     • cetirizine (ZYRTEC ALLERGY) 10 MG Oral Tab Take 1 tablet (10 mg total) by mouth daily. • atorvastatin 40 MG Oral Tab Take 40 mg by mouth nightly.      • Glucose (female): Denies hematuria    Vitals: /72   Pulse 64   Temp 97.1 °F (36.2 °C) (Temporal)   Resp 17   Ht 5' 3.5\" (1.613 m)   Wt 236 lb (107 kg)   SpO2 97%   BMI 41.15 kg/m²    Reviewed by Berna Richard M.D.     Physical Exam:  GEN: well developed, well Future  - AST (SGOT); Future  - LIPID PANEL; Future    4. Anxiety  Stable   Continue present medications     5.  Type 2 diabetes mellitus without complication, without long-term current use of insulin ([facility] [address]  Await results   Continue present medications   - SCREENING BILAT (CPT=77067)  CT CALCIUM SCORING    Meds & Refills for this Visit:  Requested Prescriptions      No prescriptions requested or ordered in this encounter         Plan f/u in 6 months    Total time spent on day of service 80 minutes  Preparing

## 2021-01-21 ENCOUNTER — HOSPITAL ENCOUNTER (OUTPATIENT)
Dept: MAMMOGRAPHY | Age: 61
Discharge: HOME | End: 2021-01-21
Attending: FAMILY MEDICINE
Payer: COMMERCIAL

## 2021-01-21 ENCOUNTER — NURSE ONLY (OUTPATIENT)
Dept: FAMILY MEDICINE CLINIC | Facility: CLINIC | Age: 61
End: 2021-01-21
Payer: COMMERCIAL

## 2021-01-21 DIAGNOSIS — E78.5 HYPERLIPIDEMIA, UNSPECIFIED HYPERLIPIDEMIA TYPE: ICD-10-CM

## 2021-01-21 DIAGNOSIS — Z12.31 ENCOUNTER FOR SCREENING MAMMOGRAM FOR MALIGNANT NEOPLASM OF BREAST: ICD-10-CM

## 2021-01-21 DIAGNOSIS — E03.9 HYPOTHYROIDISM, UNSPECIFIED TYPE: ICD-10-CM

## 2021-01-21 DIAGNOSIS — E11.9 TYPE 2 DIABETES MELLITUS WITHOUT COMPLICATION, WITHOUT LONG-TERM CURRENT USE OF INSULIN (HCC): ICD-10-CM

## 2021-01-21 DIAGNOSIS — Z00.00 WELL ADULT EXAM: ICD-10-CM

## 2021-01-21 DIAGNOSIS — I10 HYPERTENSION, UNSPECIFIED TYPE: ICD-10-CM

## 2021-01-21 LAB
ALT SERPL-CCNC: 56 U/L
ANION GAP SERPL CALC-SCNC: 6 MMOL/L (ref 0–18)
AST SERPL-CCNC: 40 U/L (ref 15–37)
BUN BLD-MCNC: 12 MG/DL (ref 7–18)
BUN/CREAT SERPL: 18.8 (ref 10–20)
CALCIUM BLD-MCNC: 9.3 MG/DL (ref 8.5–10.1)
CHLORIDE SERPL-SCNC: 106 MMOL/L (ref 98–112)
CHOLEST SMN-MCNC: 241 MG/DL (ref ?–200)
CO2 SERPL-SCNC: 27 MMOL/L (ref 21–32)
CREAT BLD-MCNC: 0.64 MG/DL
CREAT UR-SCNC: 90.8 MG/DL
DEPRECATED RDW RBC AUTO: 49.1 FL (ref 35.1–46.3)
ERYTHROCYTE [DISTWIDTH] IN BLOOD BY AUTOMATED COUNT: 15.6 % (ref 11–15)
EST. AVERAGE GLUCOSE BLD GHB EST-MCNC: 194 MG/DL (ref 68–126)
GLUCOSE BLD-MCNC: 183 MG/DL (ref 70–99)
HBA1C MFR BLD HPLC: 8.4 % (ref ?–5.7)
HCT VFR BLD AUTO: 43.4 %
HDLC SERPL-MCNC: 46 MG/DL (ref 40–59)
HGB BLD-MCNC: 13.5 G/DL
LDLC SERPL CALC-MCNC: 141 MG/DL (ref ?–100)
MCH RBC QN AUTO: 26.8 PG (ref 26–34)
MCHC RBC AUTO-ENTMCNC: 31.1 G/DL (ref 31–37)
MCV RBC AUTO: 86.1 FL
MICROALBUMIN UR-MCNC: 1.88 MG/DL
MICROALBUMIN/CREAT 24H UR-RTO: 20.7 UG/MG (ref ?–30)
NONHDLC SERPL-MCNC: 195 MG/DL (ref ?–130)
OSMOLALITY SERPL CALC.SUM OF ELEC: 292 MOSM/KG (ref 275–295)
PATIENT FASTING Y/N/NP: YES
PATIENT FASTING Y/N/NP: YES
PLATELET # BLD AUTO: 236 10(3)UL (ref 150–450)
POTASSIUM SERPL-SCNC: 3.9 MMOL/L (ref 3.5–5.1)
RBC # BLD AUTO: 5.04 X10(6)UL
SODIUM SERPL-SCNC: 139 MMOL/L (ref 136–145)
T3FREE SERPL-MCNC: 2.4 PG/ML (ref 2.4–4.2)
T4 FREE SERPL-MCNC: 1.1 NG/DL (ref 0.8–1.7)
TRIGL SERPL-MCNC: 272 MG/DL (ref 30–149)
TSI SER-ACNC: 1.92 MIU/ML (ref 0.36–3.74)
VLDLC SERPL CALC-MCNC: 54 MG/DL (ref 0–30)
WBC # BLD AUTO: 7.1 X10(3) UL (ref 4–11)

## 2021-01-21 PROCEDURE — 80048 BASIC METABOLIC PNL TOTAL CA: CPT | Performed by: FAMILY MEDICINE

## 2021-01-21 PROCEDURE — 84450 TRANSFERASE (AST) (SGOT): CPT | Performed by: FAMILY MEDICINE

## 2021-01-21 PROCEDURE — 3061F NEG MICROALBUMINURIA REV: CPT | Performed by: FAMILY MEDICINE

## 2021-01-21 PROCEDURE — 82570 ASSAY OF URINE CREATININE: CPT | Performed by: FAMILY MEDICINE

## 2021-01-21 PROCEDURE — 80061 LIPID PANEL: CPT | Performed by: FAMILY MEDICINE

## 2021-01-21 PROCEDURE — 84460 ALANINE AMINO (ALT) (SGPT): CPT | Performed by: FAMILY MEDICINE

## 2021-01-21 PROCEDURE — 85027 COMPLETE CBC AUTOMATED: CPT | Performed by: FAMILY MEDICINE

## 2021-01-21 PROCEDURE — 84443 ASSAY THYROID STIM HORMONE: CPT | Performed by: FAMILY MEDICINE

## 2021-01-21 PROCEDURE — 82043 UR ALBUMIN QUANTITATIVE: CPT | Performed by: FAMILY MEDICINE

## 2021-01-21 PROCEDURE — 77067 SCR MAMMO BI INCL CAD: CPT | Performed by: FAMILY MEDICINE

## 2021-01-21 PROCEDURE — 83036 HEMOGLOBIN GLYCOSYLATED A1C: CPT | Performed by: FAMILY MEDICINE

## 2021-01-21 PROCEDURE — 84439 ASSAY OF FREE THYROXINE: CPT | Performed by: FAMILY MEDICINE

## 2021-01-21 PROCEDURE — 84481 FREE ASSAY (FT-3): CPT | Performed by: FAMILY MEDICINE

## 2021-01-21 NOTE — PROGRESS NOTES
Patient to clinic for labs per TJ  Mint and lavender tubes drawn right lateral AC x 1 attempt    Urine sent in red top tube

## 2021-01-27 ENCOUNTER — TELEPHONE (OUTPATIENT)
Dept: FAMILY MEDICINE CLINIC | Facility: CLINIC | Age: 61
End: 2021-01-27

## 2021-01-27 NOTE — TELEPHONE ENCOUNTER
Patient states that her blood sugars have been running high. She states that from the time she called till now she had reviewed the my chart message and will increase the metformin. She had not been doing that.

## 2021-02-01 ENCOUNTER — TELEPHONE (OUTPATIENT)
Dept: FAMILY MEDICINE CLINIC | Facility: CLINIC | Age: 61
End: 2021-02-01

## 2021-02-01 NOTE — TELEPHONE ENCOUNTER
Patient states she has sarcoidosis. 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 She has heard conflicting info regarding vaccin and  eople with autoimmune disease.     Patient has not had any reaction to previous

## 2021-02-01 NOTE — TELEPHONE ENCOUNTER
Pt would like to talk to nurse or TJ about getting the Covid vaccine. She said she has an autoimmune disease.     Please return call to [phone]

## 2021-02-01 NOTE — TELEPHONE ENCOUNTER
It is fine for her to get the COVID vaccine. I would have her wait until it has been 3 months from the time she ended her COVID quarantine back in November.  Thanks

## 2021-02-08 ENCOUNTER — TELEPHONE (OUTPATIENT)
Dept: FAMILY MEDICINE CLINIC | Facility: CLINIC | Age: 61
End: 2021-02-08

## 2021-02-08 RX ORDER — FLUCONAZOLE 150 MG/1
TABLET ORAL
Qty: 3 TABLET | Refills: 0 | Status: SHIPPED | OUTPATIENT
Start: 2021-02-08 | End: 2021-05-08

## 2021-02-08 NOTE — TELEPHONE ENCOUNTER
Spoke to pt, she is wanting to try the medication recommended. She has tried Nystatin cream and is finding it is not working. She stated its just been very itchy and wanting some relief. Ok to send medication to [facility] in [place]. Thanks!

## 2021-02-08 NOTE — TELEPHONE ENCOUNTER
Overdue result letter mailed to patient   Lab Frequency Next Occurrence   CT CALCIUM SCORING Once 01/07/2021

## 2021-02-08 NOTE — TELEPHONE ENCOUNTER
She does not need a referral for OB/GYN as far as I know, she just needs to stay in [facility],   [facility]  N:[phone]  R:[phone]  The other option is to put her on Diflucan 150 mg, 1 tab every day x 3 days, #3, 0 rf and see if the yeast infecti

## 2021-02-08 NOTE — TELEPHONE ENCOUNTER
Pt is broken out in her vaginal area. She is prone to yeast infections from diabetes. Pt is requesting a referral for an ob/gyn since she has an HMO.      Please advise

## 2021-03-18 ENCOUNTER — TELEPHONE (OUTPATIENT)
Dept: FAMILY MEDICINE CLINIC | Facility: CLINIC | Age: 61
End: 2021-03-18

## 2021-03-18 DIAGNOSIS — L84 CLAVUS: Primary | ICD-10-CM

## 2021-03-18 NOTE — TELEPHONE ENCOUNTER
Patient sent a my-chart message that she is needing a referral for Dr. Nathan Snyder. Patient has an appointment on 3 25 2021. She is being seen for a corn on her left 5th digit.    Referral has been entered - can you sign

## 2021-03-20 DIAGNOSIS — Z23 NEED FOR VACCINATION: ICD-10-CM

## 2021-03-22 ENCOUNTER — OFFICE VISIT (OUTPATIENT)
Dept: FAMILY MEDICINE CLINIC | Facility: CLINIC | Age: 61
End: 2021-03-22

## 2021-03-22 VITALS
TEMPERATURE: 99 F | DIASTOLIC BLOOD PRESSURE: 60 MMHG | RESPIRATION RATE: 16 BRPM | WEIGHT: 241 LBS | OXYGEN SATURATION: 98 % | SYSTOLIC BLOOD PRESSURE: 130 MMHG | HEART RATE: 72 BPM | BODY MASS INDEX: 42.7 KG/M2 | HEIGHT: 63 IN

## 2021-03-22 DIAGNOSIS — L84 CORN INFECTED: ICD-10-CM

## 2021-03-22 DIAGNOSIS — L03.032 CELLULITIS OF LEFT TOE: Primary | ICD-10-CM

## 2021-03-22 PROCEDURE — 99214 OFFICE O/P EST MOD 30 MIN: CPT | Performed by: FAMILY MEDICINE

## 2021-03-22 PROCEDURE — 3078F DIAST BP <80 MM HG: CPT | Performed by: FAMILY MEDICINE

## 2021-03-22 PROCEDURE — 3008F BODY MASS INDEX DOCD: CPT | Performed by: FAMILY MEDICINE

## 2021-03-22 PROCEDURE — 3075F SYST BP GE 130 - 139MM HG: CPT | Performed by: FAMILY MEDICINE

## 2021-03-22 RX ORDER — SULFAMETHOXAZOLE AND TRIMETHOPRIM 800; 160 MG/1; MG/1
1 TABLET ORAL 2 TIMES DAILY
Qty: 20 TABLET | Refills: 0 | Status: SHIPPED | OUTPATIENT
Start: 2021-03-22 | End: 2021-04-01

## 2021-03-24 NOTE — PROGRESS NOTES
[facility] Office Note  Chief Complaint:   Patient presents with:  Corn: on left foot       HPI:   This is a 62year old female with known hx of DM coming in for  L foot pinky toe - swelling and redness with severe pain unable to vision  Diazepam                OTHER (SEE COMMENTS)    Comment:Prolonged effect to diazepam 10 (per pt)             Loss of memory, severe sedation  Current Meds:  Current Outpatient Medications   Medication Sig Dispense Refill   • Sulfamethoxazole-TMP DS nourished,in no apparent distress  SKIN: no rashes,no suspicious lesions  HEENT: atraumatic, normocephalic  NECK: supple  LUNGS: No tachypnea   CARDIO: No tachycardia   GI: not distended   EXTREMITIES:   L foot exam:   - defect/deformity : none noted   - s verbalizes understanding. Patient is notified to call with any questions, complications, allergies, or worsening or changing symptoms. Patient is to call with any side effects or complications from the treatments as a result of today.      Problem List:  P

## 2021-03-25 ENCOUNTER — OFFICE VISIT (OUTPATIENT)
Dept: FAMILY MEDICINE CLINIC | Facility: CLINIC | Age: 61
End: 2021-03-25

## 2021-03-25 VITALS
WEIGHT: 241.13 LBS | HEIGHT: 63 IN | BODY MASS INDEX: 42.72 KG/M2 | OXYGEN SATURATION: 97 % | DIASTOLIC BLOOD PRESSURE: 76 MMHG | TEMPERATURE: 98 F | SYSTOLIC BLOOD PRESSURE: 118 MMHG | RESPIRATION RATE: 16 BRPM | HEART RATE: 66 BPM

## 2021-03-25 DIAGNOSIS — Z09 FOLLOW-UP EXAM: Primary | ICD-10-CM

## 2021-03-25 DIAGNOSIS — L03.032 CELLULITIS OF FIFTH TOE OF LEFT FOOT: ICD-10-CM

## 2021-03-25 PROCEDURE — 3078F DIAST BP <80 MM HG: CPT | Performed by: FAMILY MEDICINE

## 2021-03-25 PROCEDURE — 99213 OFFICE O/P EST LOW 20 MIN: CPT | Performed by: FAMILY MEDICINE

## 2021-03-25 PROCEDURE — 3074F SYST BP LT 130 MM HG: CPT | Performed by: FAMILY MEDICINE

## 2021-03-25 PROCEDURE — 3008F BODY MASS INDEX DOCD: CPT | Performed by: FAMILY MEDICINE

## 2021-03-25 RX ORDER — ONDANSETRON 4 MG/1
4 TABLET, ORALLY DISINTEGRATING ORAL EVERY 6 HOURS PRN
Qty: 12 TABLET | Refills: 0 | Status: SHIPPED | OUTPATIENT
Start: 2021-03-25 | End: 2021-06-03

## 2021-03-25 NOTE — PROGRESS NOTES
[facility] Office Note  Chief Complaint:   Patient presents with:   Follow - Up: infected left toe      HPI:   This is a 62year old female coming in for  Follow up L 5th digit - toe infection - now much improved   Still some dry s effect to diazepam 10 (per pt)             Loss of memory, severe sedation  Current Meds:  Current Outpatient Medications   Medication Sig Dispense Refill   • ondansetron 4 MG Oral Tablet Dispersible Take 1 tablet (4 mg total) by mouth every 6 (six) hours age, well groomed.     Physical Exam   GENERAL: well developed, well nourished,in no apparent distress  SKIN: no rashes,no suspicious lesions  HEENT: atraumatic, normocephalic  NECK: supple  LUNGS: No tachypnea   CARDIO: No tachycardia   GI: not distended mellitus, type 2) (HCC)     GERD (gastroesophageal reflux disease)     Hyperlipidemia     Hypertension     Hypothyroid     Sarcoid     Fatty liver

## 2021-03-30 RX ORDER — METFORMIN HYDROCHLORIDE 500 MG/1
1000 TABLET, EXTENDED RELEASE ORAL 2 TIMES DAILY WITH MEALS
Qty: 180 TABLET | Refills: 0 | Status: SHIPPED | OUTPATIENT
Start: 2021-03-30 | End: 2021-05-10

## 2021-03-30 NOTE — TELEPHONE ENCOUNTER
metFORMIN HCl  MG Oral Tablet 24 Hr        Pt is currently out of medication. .      Pt would like refill sent to  [id number] [address], [address] [facility]  [address] [place] [id number] DAISY CHUNG [phone], [phone]

## 2021-03-30 NOTE — TELEPHONE ENCOUNTER
PT CALLED AND ADV THAT SHE IS TAKING 4 TABLETS PER DAY AND IS COMPLETELY OUT OF MEDS. DID NOT HAVE ANY FOR TODAY.     metFORMIN HCl  MG Oral Tablet 24 Hr      [facility]        THANK YOU

## 2021-04-15 ENCOUNTER — HOSPITAL ENCOUNTER (OUTPATIENT)
Dept: CT IMAGING | Age: 61
Discharge: HOME | End: 2021-04-15
Attending: FAMILY MEDICINE

## 2021-04-15 DIAGNOSIS — E78.5 HYPERLIPIDEMIA, UNSPECIFIED HYPERLIPIDEMIA TYPE: ICD-10-CM

## 2021-04-20 ENCOUNTER — PATIENT MESSAGE (OUTPATIENT)
Dept: FAMILY MEDICINE CLINIC | Facility: CLINIC | Age: 61
End: 2021-04-20

## 2021-04-20 DIAGNOSIS — E78.5 HYPERLIPIDEMIA, UNSPECIFIED HYPERLIPIDEMIA TYPE: ICD-10-CM

## 2021-04-20 DIAGNOSIS — E11.9 TYPE 2 DIABETES MELLITUS WITHOUT COMPLICATION, WITHOUT LONG-TERM CURRENT USE OF INSULIN (HCC): Primary | ICD-10-CM

## 2021-04-20 NOTE — TELEPHONE ENCOUNTER
From: Erick Perez  To:  Tyler Arellano MD  Sent: 4/20/2021 2:34 PM CDT  Subject: Referral Request    i would like a referral to the [facility] please, during my K Heart scan the nurse suggested that this is something that

## 2021-05-08 ENCOUNTER — TELEPHONE (OUTPATIENT)
Dept: FAMILY MEDICINE CLINIC | Facility: CLINIC | Age: 61
End: 2021-05-08

## 2021-05-08 NOTE — TELEPHONE ENCOUNTER
Please let patient or caregiver know or leave message that [employer] and the insurance company use a sophisticated computer program to identify gaps in patient's care. She has been identified as an uncontrolled diabetic.  I had wanted to see her back about 1

## 2021-05-10 RX ORDER — METFORMIN HYDROCHLORIDE 500 MG/1
TABLET, EXTENDED RELEASE ORAL
Qty: 180 TABLET | Refills: 0 | Status: SHIPPED | OUTPATIENT
Start: 2021-05-10 | End: 2021-06-21

## 2021-05-10 NOTE — TELEPHONE ENCOUNTER
Spoke with patient and made an apt.   Future Appointments   Date Time Provider Duong Sales   5/13/2021 11:20 AM Otilio Ramirez MD [facility] Richie Stevens   5/24/2021  7:20 AM DRAKE Guillen [facility]

## 2021-05-10 NOTE — TELEPHONE ENCOUNTER
Diabetic Medication Protocol Tbiaho81/10/2021 03:36 AM   Last HgBA1C < 7.5 Protocol Details    HgBA1C procedure resulted in past 6 months     Microalbumin procedure in past 12 months or taking ACE/ARB     Appointment in past 6 or next 3 months      Routing

## 2021-05-14 ENCOUNTER — HOSPITAL ENCOUNTER (OUTPATIENT)
Age: 61
Discharge: HOME | End: 2021-05-14
Payer: COMMERCIAL

## 2021-05-14 ENCOUNTER — OFFICE VISIT (OUTPATIENT)
Dept: FAMILY MEDICINE CLINIC | Facility: CLINIC | Age: 61
End: 2021-05-14
Payer: COMMERCIAL

## 2021-05-14 VITALS
RESPIRATION RATE: 18 BRPM | HEART RATE: 97 BPM | DIASTOLIC BLOOD PRESSURE: 76 MMHG | SYSTOLIC BLOOD PRESSURE: 142 MMHG | TEMPERATURE: 99 F | OXYGEN SATURATION: 95 %

## 2021-05-14 VITALS
TEMPERATURE: 97 F | HEART RATE: 71 BPM | RESPIRATION RATE: 16 BRPM | OXYGEN SATURATION: 97 % | DIASTOLIC BLOOD PRESSURE: 78 MMHG | SYSTOLIC BLOOD PRESSURE: 132 MMHG

## 2021-05-14 DIAGNOSIS — J01.10 ACUTE NON-RECURRENT FRONTAL SINUSITIS: Primary | ICD-10-CM

## 2021-05-14 DIAGNOSIS — Z02.9 ADMINISTRATIVE ENCOUNTER: Primary | ICD-10-CM

## 2021-05-14 PROCEDURE — U0002 COVID-19 LAB TEST NON-CDC: HCPCS | Performed by: PHYSICIAN ASSISTANT

## 2021-05-14 PROCEDURE — 99203 OFFICE O/P NEW LOW 30 MIN: CPT | Performed by: PHYSICIAN ASSISTANT

## 2021-05-14 PROCEDURE — 3078F DIAST BP <80 MM HG: CPT | Performed by: NURSE PRACTITIONER

## 2021-05-14 PROCEDURE — 3077F SYST BP >= 140 MM HG: CPT | Performed by: NURSE PRACTITIONER

## 2021-05-14 NOTE — PROGRESS NOTES
Catalino May is a 62year old female w/ hx of type II DM and pulmonary sarcoidosis who presents to [facility] with c/o worsening cough this past week. Notes greenish sputum. Denies fevers. Notes cough is getting worse and she feels some shortness of breath.  Sherrell Nelson

## 2021-05-14 NOTE — ED PROVIDER NOTES
Patient Seen in: Immediate [address] [place] [address]      History   Patient presents with:  Cough/URI    Stated Complaint: Worsening cough/runny nose coughing    HPI/Subjective:   HPI    64-year-old female presents to the immediate care with cough, congestion, facial Tympanic membrane and external ear normal.      Left Ear: Tympanic membrane and external ear normal.      Nose: Congestion present. Right Sinus: Frontal sinus tenderness present. No maxillary sinus tenderness.       Left Sinus: Frontal sinus tenderness

## 2021-05-20 ENCOUNTER — OFFICE VISIT (OUTPATIENT)
Dept: FAMILY MEDICINE CLINIC | Facility: CLINIC | Age: 61
End: 2021-05-20

## 2021-05-20 VITALS
WEIGHT: 234 LBS | RESPIRATION RATE: 18 BRPM | BODY MASS INDEX: 41 KG/M2 | TEMPERATURE: 99 F | DIASTOLIC BLOOD PRESSURE: 88 MMHG | OXYGEN SATURATION: 96 % | SYSTOLIC BLOOD PRESSURE: 138 MMHG | HEART RATE: 86 BPM

## 2021-05-20 DIAGNOSIS — R05.9 COUGH: ICD-10-CM

## 2021-05-20 DIAGNOSIS — D86.9 SARCOID: ICD-10-CM

## 2021-05-20 DIAGNOSIS — E11.9 TYPE 2 DIABETES MELLITUS WITHOUT COMPLICATION, WITHOUT LONG-TERM CURRENT USE OF INSULIN (HCC): Primary | ICD-10-CM

## 2021-05-20 PROCEDURE — 99214 OFFICE O/P EST MOD 30 MIN: CPT | Performed by: FAMILY MEDICINE

## 2021-05-20 PROCEDURE — 83036 HEMOGLOBIN GLYCOSYLATED A1C: CPT | Performed by: FAMILY MEDICINE

## 2021-05-20 PROCEDURE — 3075F SYST BP GE 130 - 139MM HG: CPT | Performed by: FAMILY MEDICINE

## 2021-05-20 PROCEDURE — 3079F DIAST BP 80-89 MM HG: CPT | Performed by: FAMILY MEDICINE

## 2021-05-20 RX ORDER — CITALOPRAM 20 MG/1
20 TABLET ORAL DAILY
Qty: 90 TABLET | Refills: 3 | Status: SHIPPED | OUTPATIENT
Start: 2021-05-20

## 2021-05-20 RX ORDER — AZITHROMYCIN 250 MG/1
TABLET, FILM COATED ORAL
Qty: 6 TABLET | Refills: 0 | Status: SHIPPED | OUTPATIENT
Start: 2021-05-20 | End: 2021-05-25

## 2021-05-20 RX ORDER — CODEINE PHOSPHATE AND GUAIFENESIN 10; 100 MG/5ML; MG/5ML
SOLUTION ORAL EVERY 6 HOURS PRN
Qty: 100 ML | Refills: 0 | Status: SHIPPED | OUTPATIENT
Start: 2021-05-20 | End: 2021-06-08

## 2021-05-20 NOTE — PROGRESS NOTES
Patient complains of the following symptoms:   Lump groin area size of a marble. She had called last week was told to keep an eye on it but now there is 2 lumps she been crying mother not sure if because lump     How long have the symptoms occurred:   09/16/2020    Have you or someone you have had contact with traveled outside the [place] in the past 14 days? no   If so what country? n/a    Was there an injury or accident: no   If so when   n/a    Pharm[facility]     Patient phone number verified and updated in EPIC: yes     Silvia Barahona is a 62year old female. CC:  Patient presents with: Follow - Up: per pt      HPI:  Here to follow up diabetes.  I increased her Metformin dose in 1/2021, she is here to f/u  Home sugar readings: none to review  Insulin use: none  Diet [id number] UNIT/GM External Ointment Apply topically 2 (two) times daily.   0        History:  Past Medical History:   Diagnosis Date   • Allergic rhinitis    • Anxiety    • Diabetes [facility])    • Diverticulosis 06/2020    Seen on screening colonoscopy   • DM2 (di examined  BREAST: not examined/not applicable  EXTREMITIES: No clubbing, cyanosis or edema  RECTAL: not examined  GENITAL: not examined  LYMPH: no supraclavicular nodes  MUSCULOSKELETAL: normal ambulation  NEURO: Awake and alert.  Normal speech and articula

## 2021-05-21 ENCOUNTER — TELEPHONE (OUTPATIENT)
Dept: FAMILY MEDICINE CLINIC | Facility: CLINIC | Age: 61
End: 2021-05-21

## 2021-05-21 RX ORDER — PEN NEEDLE, DIABETIC 30 GX3/16"
1 NEEDLE, DISPOSABLE MISCELLANEOUS
Qty: 30 EACH | Refills: 0 | Status: SHIPPED | OUTPATIENT
Start: 2021-05-21

## 2021-05-21 RX ORDER — SEMAGLUTIDE 1.34 MG/ML
0.25 INJECTION, SOLUTION SUBCUTANEOUS
Qty: 1 PEN | Refills: 1 | Status: SHIPPED | OUTPATIENT
Start: 2021-05-21 | End: 2021-06-16 | Stop reason: SINTOL

## 2021-05-21 NOTE — TELEPHONE ENCOUNTER
Patient called back the office. She is willing to try the Ozempic. Please send to [place]. She states that she has tried an injectable a few years ago and it made her sick but she is willing to try a different one.      Follow up appointment m

## 2021-06-03 ENCOUNTER — PATIENT MESSAGE (OUTPATIENT)
Dept: FAMILY MEDICINE CLINIC | Facility: CLINIC | Age: 61
End: 2021-06-03

## 2021-06-03 RX ORDER — ONDANSETRON 4 MG/1
4 TABLET, ORALLY DISINTEGRATING ORAL EVERY 6 HOURS PRN
Qty: 30 TABLET | Refills: 1 | Status: SHIPPED | OUTPATIENT
Start: 2021-06-03

## 2021-06-03 NOTE — TELEPHONE ENCOUNTER
From: Pasha Sic  To: Lei Berrios MD  Sent: 6/3/2021 1:37 PM CDT  Subject: Non-Urgent Medical Question    I was wondering because I have so much nausea for the Ozempic can you prescribe more of the Zofran that Dr. Xavier Lackey gave me?  Thanks

## 2021-06-08 ENCOUNTER — TELEPHONE (OUTPATIENT)
Dept: FAMILY MEDICINE CLINIC | Facility: CLINIC | Age: 61
End: 2021-06-08

## 2021-06-21 RX ORDER — METFORMIN HYDROCHLORIDE 500 MG/1
TABLET, EXTENDED RELEASE ORAL
Qty: 360 TABLET | Refills: 1 | Status: SHIPPED | OUTPATIENT
Start: 2021-06-21 | End: 2021-12-29

## 2021-06-30 ENCOUNTER — OFFICE VISIT (OUTPATIENT)
Dept: FAMILY MEDICINE CLINIC | Facility: CLINIC | Age: 61
End: 2021-06-30

## 2021-06-30 VITALS
SYSTOLIC BLOOD PRESSURE: 122 MMHG | OXYGEN SATURATION: 97 % | DIASTOLIC BLOOD PRESSURE: 80 MMHG | HEART RATE: 66 BPM | RESPIRATION RATE: 17 BRPM | BODY MASS INDEX: 41 KG/M2 | WEIGHT: 233 LBS | TEMPERATURE: 98 F

## 2021-06-30 DIAGNOSIS — N76.1 CHRONIC VAGINITIS: ICD-10-CM

## 2021-06-30 DIAGNOSIS — E11.9 TYPE 2 DIABETES MELLITUS WITHOUT COMPLICATION, WITHOUT LONG-TERM CURRENT USE OF INSULIN (HCC): Primary | ICD-10-CM

## 2021-06-30 DIAGNOSIS — L21.9 SEBORRHEIC DERMATITIS: ICD-10-CM

## 2021-06-30 DIAGNOSIS — D86.9 SARCOID: ICD-10-CM

## 2021-06-30 PROCEDURE — 3074F SYST BP LT 130 MM HG: CPT | Performed by: FAMILY MEDICINE

## 2021-06-30 PROCEDURE — 99214 OFFICE O/P EST MOD 30 MIN: CPT | Performed by: FAMILY MEDICINE

## 2021-06-30 PROCEDURE — 3079F DIAST BP 80-89 MM HG: CPT | Performed by: FAMILY MEDICINE

## 2021-06-30 NOTE — PROGRESS NOTES
Jolene Queen is a 62year old female. CC:  Patient presents with:  Medication Follow-Up: per pt  Itchiness: both eye lids, dryness      HPI:  Here to follow up diabetes. Did not tolerate Ozempic. I placed her on Jardiance instead.   Home sugar readi • omeprazole 20 MG Oral Capsule Delayed Release Take 20 mg by mouth every morning before breakfast.     • cetirizine (ZYRTEC ALLERGY) 10 MG Oral Tab Take 1 tablet (10 mg total) by mouth daily.      • Magnesium-Potassium 250-100 MG Oral Tab Take 1 tablet b M.D.    Physical Exam:  GEN: well developed, well nourished, in no apparent distress  EYE: PERRLA, EOMI, B upper lids with small patches of erythema and flaking  HENT: B pinnas, external auditory canals and tympanic membranes are normal.   NECK: No lymphad

## 2021-07-09 ENCOUNTER — PATIENT MESSAGE (OUTPATIENT)
Dept: FAMILY MEDICINE CLINIC | Facility: CLINIC | Age: 61
End: 2021-07-09

## 2021-07-09 NOTE — TELEPHONE ENCOUNTER
From: Mackenzie Chaney  To:  Miguel Angel Franklin MD  Sent: 7/9/2021 4:51 PM CDT  Subject: Non-Urgent Medical Question    I feel foolish asking this but I work at [employer] [place] part time and in order for me to have water up front were I work, I have to have a doctors note

## 2021-07-15 ENCOUNTER — OFFICE VISIT (OUTPATIENT)
Dept: OBGYN CLINIC | Facility: CLINIC | Age: 61
End: 2021-07-15

## 2021-07-15 VITALS
BODY MASS INDEX: 39.46 KG/M2 | DIASTOLIC BLOOD PRESSURE: 74 MMHG | SYSTOLIC BLOOD PRESSURE: 124 MMHG | HEIGHT: 64.5 IN | WEIGHT: 234 LBS

## 2021-07-15 DIAGNOSIS — L30.4 INTERTRIGO: Primary | ICD-10-CM

## 2021-07-15 DIAGNOSIS — N95.2 ATROPHIC VAGINITIS: ICD-10-CM

## 2021-07-15 PROCEDURE — 87102 FUNGUS ISOLATION CULTURE: CPT | Performed by: OBSTETRICS & GYNECOLOGY

## 2021-07-15 PROCEDURE — 3078F DIAST BP <80 MM HG: CPT | Performed by: OBSTETRICS & GYNECOLOGY

## 2021-07-15 PROCEDURE — 87206 SMEAR FLUORESCENT/ACID STAI: CPT | Performed by: OBSTETRICS & GYNECOLOGY

## 2021-07-15 PROCEDURE — 3008F BODY MASS INDEX DOCD: CPT | Performed by: OBSTETRICS & GYNECOLOGY

## 2021-07-15 PROCEDURE — 3074F SYST BP LT 130 MM HG: CPT | Performed by: OBSTETRICS & GYNECOLOGY

## 2021-07-15 PROCEDURE — 99243 OFF/OP CNSLTJ NEW/EST LOW 30: CPT | Performed by: OBSTETRICS & GYNECOLOGY

## 2021-07-15 RX ORDER — NYSTATIN 100000 U/G
CREAM TOPICAL
COMMUNITY
Start: 2021-07-07 | End: 2021-08-07

## 2021-07-15 NOTE — PROGRESS NOTES
[facility]  Obstetrics and Gynecology  Consultation History & Physical    Sofie Perez Patient Status:  No patient class for patient encounter    1960 MRN [id number]   Location [facility], [address] Attending No at Sarcoid      Past Surgical History:   Procedure Laterality Date   • CHOLECYSTECTOMY  2011   • COLONOSCOPY  06/25/2020    Dr. Alejandro Fisher   • HYSTERECTOMY  2005    Due to DUB   • THORACOTOMY,LTD,BIOPSY  2015    To diagnose Sarcoid       Review of Systems

## 2021-07-26 ENCOUNTER — TELEPHONE (OUTPATIENT)
Dept: FAMILY MEDICINE CLINIC | Facility: CLINIC | Age: 61
End: 2021-07-26

## 2021-07-26 DIAGNOSIS — E11.9 TYPE 2 DIABETES MELLITUS WITHOUT COMPLICATION, WITHOUT LONG-TERM CURRENT USE OF INSULIN (HCC): Primary | ICD-10-CM

## 2021-07-26 NOTE — TELEPHONE ENCOUNTER
Pt is due for diabetic eye exam. Referral is required as pt has HMO. If appropriate please place referral and route back to me. Thank you.

## 2021-07-29 NOTE — TELEPHONE ENCOUNTER
Patient is due for DM eye exam, referral to Dr. Adriana Dykes is in [address] [place] [address] Rd. Left message to call back [phone].

## 2021-08-06 ENCOUNTER — NURSE ONLY (OUTPATIENT)
Dept: FAMILY MEDICINE CLINIC | Facility: CLINIC | Age: 61
End: 2021-08-06
Payer: COMMERCIAL

## 2021-08-06 DIAGNOSIS — E11.9 TYPE 2 DIABETES MELLITUS WITHOUT COMPLICATION, WITHOUT LONG-TERM CURRENT USE OF INSULIN (HCC): ICD-10-CM

## 2021-08-06 LAB
EST. AVERAGE GLUCOSE BLD GHB EST-MCNC: 183 MG/DL (ref 68–126)
HBA1C MFR BLD HPLC: 8 % (ref ?–5.7)

## 2021-08-06 PROCEDURE — 83036 HEMOGLOBIN GLYCOSYLATED A1C: CPT | Performed by: FAMILY MEDICINE

## 2021-08-06 PROCEDURE — 3052F HG A1C>EQUAL 8.0%<EQUAL 9.0%: CPT | Performed by: FAMILY MEDICINE

## 2021-08-06 NOTE — PATIENT INSTRUCTIONS
Blood work drawn per orders in chart for Dr. Grant Gomez  1 purple- GLY  22g right [facility]  Patient left the office in stable condition.

## 2021-08-31 ENCOUNTER — OFFICE VISIT (OUTPATIENT)
Dept: FAMILY MEDICINE CLINIC | Facility: CLINIC | Age: 61
End: 2021-08-31

## 2021-08-31 VITALS
WEIGHT: 238 LBS | SYSTOLIC BLOOD PRESSURE: 130 MMHG | BODY MASS INDEX: 40 KG/M2 | OXYGEN SATURATION: 98 % | TEMPERATURE: 99 F | HEART RATE: 62 BPM | DIASTOLIC BLOOD PRESSURE: 84 MMHG

## 2021-08-31 DIAGNOSIS — D86.9 SARCOID: ICD-10-CM

## 2021-08-31 DIAGNOSIS — R06.02 SOB (SHORTNESS OF BREATH): ICD-10-CM

## 2021-08-31 DIAGNOSIS — R05.9 COUGH: Primary | ICD-10-CM

## 2021-08-31 PROCEDURE — 3075F SYST BP GE 130 - 139MM HG: CPT | Performed by: FAMILY MEDICINE

## 2021-08-31 PROCEDURE — 3079F DIAST BP 80-89 MM HG: CPT | Performed by: FAMILY MEDICINE

## 2021-08-31 PROCEDURE — 99214 OFFICE O/P EST MOD 30 MIN: CPT | Performed by: FAMILY MEDICINE

## 2021-08-31 RX ORDER — PREDNISONE 20 MG/1
TABLET ORAL
Qty: 18 TABLET | Refills: 0 | Status: SHIPPED | OUTPATIENT
Start: 2021-08-31 | End: 2021-10-07

## 2021-08-31 NOTE — PROGRESS NOTES
Juani Madden is a 62year old female. CC:  Patient presents with: Other: per pt- inflammation/sarcoidosis      HPI:  Hx of sarcoid. She has had an exacerbation for about 10 days with dry cough, HERNANDEZ, upper back pain.  Symptoms are similar to previou Levothyroxine Sodium 88 MCG Oral Tab Take 88 mcg by mouth daily. • Losartan Potassium 25 MG Oral Tab Take 25 mg by mouth daily.      • omeprazole 20 MG Oral Capsule Delayed Release Take 20 mg by mouth every morning before breakfast.     • cetirizine (WESLEY Wt 238 lb (108 kg)   SpO2 98%   BMI 40.22 kg/m²    Reviewed by Nori Garcia M.D.     Physical Exam:  GEN: well developed, well nourished, in no apparent distress  EYE: B conjunctiva and lids normal  HENT: B pinnas, external auditory canals and tympanic membr

## 2021-09-01 ENCOUNTER — LAB ENCOUNTER (OUTPATIENT)
Dept: LAB | Age: 61
End: 2021-09-01
Attending: FAMILY MEDICINE
Payer: COMMERCIAL

## 2021-09-01 DIAGNOSIS — D86.9 SARCOID: ICD-10-CM

## 2021-09-01 DIAGNOSIS — R05.9 COUGH: ICD-10-CM

## 2021-09-01 DIAGNOSIS — R06.02 SOB (SHORTNESS OF BREATH): ICD-10-CM

## 2021-09-02 LAB — SARS-COV-2 RNA RESP QL NAA+PROBE: NOT DETECTED

## 2021-09-07 ENCOUNTER — HOSPITAL ENCOUNTER (OUTPATIENT)
Dept: GENERAL RADIOLOGY | Age: 61
Discharge: HOME | End: 2021-09-07
Attending: FAMILY MEDICINE
Payer: COMMERCIAL

## 2021-09-07 ENCOUNTER — OFFICE VISIT (OUTPATIENT)
Dept: FAMILY MEDICINE CLINIC | Facility: CLINIC | Age: 61
End: 2021-09-07

## 2021-09-07 VITALS
TEMPERATURE: 97 F | HEART RATE: 70 BPM | WEIGHT: 238 LBS | DIASTOLIC BLOOD PRESSURE: 72 MMHG | SYSTOLIC BLOOD PRESSURE: 138 MMHG | BODY MASS INDEX: 40 KG/M2 | OXYGEN SATURATION: 96 %

## 2021-09-07 DIAGNOSIS — D86.9 SARCOID: Primary | ICD-10-CM

## 2021-09-07 DIAGNOSIS — D86.9 SARCOID: ICD-10-CM

## 2021-09-07 DIAGNOSIS — R05.9 COUGH: ICD-10-CM

## 2021-09-07 DIAGNOSIS — M54.6 BILATERAL THORACIC BACK PAIN, UNSPECIFIED CHRONICITY: ICD-10-CM

## 2021-09-07 PROCEDURE — 3075F SYST BP GE 130 - 139MM HG: CPT | Performed by: FAMILY MEDICINE

## 2021-09-07 PROCEDURE — 3078F DIAST BP <80 MM HG: CPT | Performed by: FAMILY MEDICINE

## 2021-09-07 PROCEDURE — 71046 X-RAY EXAM CHEST 2 VIEWS: CPT | Performed by: FAMILY MEDICINE

## 2021-09-07 PROCEDURE — 99214 OFFICE O/P EST MOD 30 MIN: CPT | Performed by: FAMILY MEDICINE

## 2021-09-07 RX ORDER — CYCLOBENZAPRINE HCL 10 MG
10 TABLET ORAL 3 TIMES DAILY PRN
Qty: 30 TABLET | Refills: 1 | Status: SHIPPED | OUTPATIENT
Start: 2021-09-07 | End: 2021-09-17

## 2021-09-07 NOTE — PROGRESS NOTES
Michelle Kim is a 62year old female. CC:  Patient presents with: Follow - Up: per pt      HPI:  F.u sarcoid flare and cough. Cough is a bit better. She is still SOB and is still noting mid thoracic pain. The pain is described as a tightness.  She Hydrobromide 20 MG Oral Tab Take 1 tablet (20 mg total) by mouth daily. 1 every day 90 tablet 3   • Levothyroxine Sodium 88 MCG Oral Tab Take 88 mcg by mouth daily. • Losartan Potassium 25 MG Oral Tab Take 25 mg by mouth daily.      • omeprazole 20 MG O energy  Respiratory: Denies wheezing    Vitals: /72   Pulse 70   Temp 97.4 °F (36.3 °C) (Temporal)   Wt 238 lb (108 kg)   SpO2 96%   BMI 40.22 kg/m²     Reviewed by Mildred Fritz M.D.     Physical Exam:  GEN: well developed, well nourished, in no appare

## 2021-09-08 ENCOUNTER — PATIENT MESSAGE (OUTPATIENT)
Dept: FAMILY MEDICINE CLINIC | Facility: CLINIC | Age: 61
End: 2021-09-08

## 2021-09-08 NOTE — TELEPHONE ENCOUNTER
From: Angely Wright  To: Enedina Gustafson MD  Sent: 9/8/2021 9:38 AM CDT  Subject: Non-Urgent Medical Question    I forgot to ask you for an updated off of work for [employer]. I cannot get into the pulmonologist until the 22nd of September.  If you could do [facility]

## 2021-09-13 RX ORDER — ACARBOSE 25 MG/1
TABLET ORAL
Qty: 90 TABLET | Refills: 1 | Status: SHIPPED | OUTPATIENT
Start: 2021-09-13 | End: 2022-02-04

## 2021-09-13 RX ORDER — SITAGLIPTIN 100 MG/1
TABLET, FILM COATED ORAL
Qty: 90 TABLET | Refills: 1 | Status: SHIPPED | OUTPATIENT
Start: 2021-09-13

## 2021-09-22 ENCOUNTER — MED REC SCAN ONLY (OUTPATIENT)
Dept: FAMILY MEDICINE CLINIC | Facility: CLINIC | Age: 61
End: 2021-09-22

## 2021-09-24 RX ORDER — NYSTATIN 100000 U/G
CREAM TOPICAL
Qty: 30 G | Refills: 2 | Status: SHIPPED | OUTPATIENT
Start: 2021-09-24 | End: 2022-02-04

## 2021-10-05 ENCOUNTER — TELEPHONE (OUTPATIENT)
Dept: FAMILY MEDICINE CLINIC | Facility: CLINIC | Age: 61
End: 2021-10-05

## 2021-10-05 RX ORDER — LEVOTHYROXINE SODIUM 88 UG/1
88 TABLET ORAL DAILY
Qty: 90 TABLET | Refills: 2 | Status: SHIPPED | OUTPATIENT
Start: 2021-10-05 | End: 2022-07-04

## 2021-10-05 RX ORDER — OMEPRAZOLE 20 MG/1
20 CAPSULE, DELAYED RELEASE ORAL
Qty: 90 CAPSULE | Refills: 2 | Status: SHIPPED | OUTPATIENT
Start: 2021-10-05 | End: 2022-07-04

## 2021-10-05 RX ORDER — ATORVASTATIN CALCIUM 40 MG/1
40 TABLET, FILM COATED ORAL NIGHTLY
Qty: 90 TABLET | Refills: 2 | Status: SHIPPED | OUTPATIENT
Start: 2021-10-05 | End: 2022-09-29

## 2021-10-05 NOTE — TELEPHONE ENCOUNTER
Patient called requesting rx's for:    atorvastatin 40 MG Oral Tab    omeprazole 20 MG Oral Capsule Delayed Release    Levothyroxine Sodium 88 MCG Oral Tab    [id number] - [place] [address] [address] [phone], [phone]    Please advise # [phone]    She used to get them from previous PCP.

## 2021-10-07 ENCOUNTER — TELEPHONE (OUTPATIENT)
Dept: FAMILY MEDICINE CLINIC | Facility: CLINIC | Age: 61
End: 2021-10-07

## 2021-10-07 RX ORDER — LOSARTAN POTASSIUM 25 MG/1
25 TABLET ORAL DAILY
Qty: 90 TABLET | Refills: 3 | Status: SHIPPED | OUTPATIENT
Start: 2021-10-07

## 2021-10-07 NOTE — TELEPHONE ENCOUNTER
Patient called requesting refill for:    Losartan Potassium 25 MG Oral     [id number] - [place] [address] [facility] [phone], [phone]    Please advise # [phone]

## 2021-11-10 ENCOUNTER — PATIENT MESSAGE (OUTPATIENT)
Dept: FAMILY MEDICINE CLINIC | Facility: CLINIC | Age: 61
End: 2021-11-10

## 2021-11-10 ENCOUNTER — MED REC SCAN ONLY (OUTPATIENT)
Dept: FAMILY MEDICINE CLINIC | Facility: CLINIC | Age: 61
End: 2021-11-10

## 2021-11-10 ENCOUNTER — TELEPHONE (OUTPATIENT)
Dept: FAMILY MEDICINE CLINIC | Facility: CLINIC | Age: 61
End: 2021-11-10

## 2021-11-10 DIAGNOSIS — R05.9 COUGH: ICD-10-CM

## 2021-11-10 DIAGNOSIS — D86.9 SARCOID: Primary | ICD-10-CM

## 2021-11-10 NOTE — TELEPHONE ENCOUNTER
PT CALLED REQUESTING REFERRAL VISITS TO BE ADDED OR NEW REFERRAL TO BE SENT. ORIGINAL ONLY HAD ONE VISIT AND SHE IS BEING SEEN AGAIN.     PLEASE ADVISE-THANK YOU

## 2021-11-10 NOTE — TELEPHONE ENCOUNTER
From: Mackenzie Chaney  To:  Miguel Angel Franklin MD  Sent: 11/10/2021 3:03 PM CST  Subject: BOOSTER SHOT AND FLU VACCINE    SINCE I AM STILL SEEING DR WISEMAN FOR MY SARCOSIDOSIS FLARE UP, SHOULD I GET MY BOOSTER VACCINE OR THE FLU VACCINE OR SHOULD I WAIT UNTI

## 2021-11-10 NOTE — TELEPHONE ENCOUNTER
Patient is at the pulmonologist's office being seen for follow up and the original referral only had 1 visit. Can you please place another referral for today and possible add an additional visit if she needs.

## 2021-12-02 ENCOUNTER — TELEMEDICINE (OUTPATIENT)
Dept: FAMILY MEDICINE CLINIC | Facility: CLINIC | Age: 61
End: 2021-12-02

## 2021-12-02 ENCOUNTER — TELEPHONE (OUTPATIENT)
Dept: FAMILY MEDICINE CLINIC | Facility: CLINIC | Age: 61
End: 2021-12-02

## 2021-12-02 ENCOUNTER — NURSE ONLY (OUTPATIENT)
Dept: FAMILY MEDICINE CLINIC | Facility: CLINIC | Age: 61
End: 2021-12-02
Payer: COMMERCIAL

## 2021-12-02 VITALS — DIASTOLIC BLOOD PRESSURE: 78 MMHG | SYSTOLIC BLOOD PRESSURE: 132 MMHG

## 2021-12-02 DIAGNOSIS — H53.9 VISUAL CHANGES: ICD-10-CM

## 2021-12-02 DIAGNOSIS — G44.209 TENSION-TYPE HEADACHE, NOT INTRACTABLE, UNSPECIFIED CHRONICITY PATTERN: Primary | ICD-10-CM

## 2021-12-02 DIAGNOSIS — R51.9 NONINTRACTABLE HEADACHE, UNSPECIFIED CHRONICITY PATTERN, UNSPECIFIED HEADACHE TYPE: Primary | ICD-10-CM

## 2021-12-02 PROCEDURE — 3078F DIAST BP <80 MM HG: CPT | Performed by: FAMILY MEDICINE

## 2021-12-02 PROCEDURE — 99214 OFFICE O/P EST MOD 30 MIN: CPT | Performed by: FAMILY MEDICINE

## 2021-12-02 PROCEDURE — 3075F SYST BP GE 130 - 139MM HG: CPT | Performed by: FAMILY MEDICINE

## 2021-12-02 NOTE — PATIENT INSTRUCTIONS
Patient here for blood pressure check. Dr. Nickie Gambino advised of the blood pressure reading. Advised to have patient hold the prednisone and to see Ophthalmology. Phone number for Dr. Saeid Potter provided to patient.    Patient left the office in stable co

## 2021-12-02 NOTE — PROGRESS NOTES
My Chart/ Video/Telephone Visit Check-In Due to [address] Wang verbally consents a video Check-In service on 12/02/21.   Patient understands and accepts financial responsibility for any deductible, co-insurance and/or co-pays associated 1 TABLET DAILY WITH BREAKFAST FOR 1 WEEK THEN TAKE 1 TABLET WITH BREAKFAST AND DINNER FOR 1 WEEK THEN TAKE 1 TABLET 3 TIMES DAILY WITH MEALS 90 tablet 1   • Fluticasone Furoate-Vilanterol (BREO ELLIPTA) 200-25 MCG/INH Inhalation Aerosol Powder, Breath Acti (Leukemia) Brother       Family Status   Relation Status   • Mo    • Fa    • Bro Alive   • [address]       Social History    Tobacco Use      Smoking status: Former Smoker        Quit date:         Years since quittin.9      Smok and decision making.   Appropriate medical decision-making and tests are ordered as detailed in the plan of care above.”

## 2021-12-02 NOTE — TELEPHONE ENCOUNTER
Patient needs a referral for Dr.Oppenheim. With her [facility] [employer] HMO she cannot [facility]. Patient was provided with the phone number for Dr.Oppenheim.

## 2021-12-29 RX ORDER — METFORMIN HYDROCHLORIDE 500 MG/1
TABLET, EXTENDED RELEASE ORAL
Qty: 360 TABLET | Refills: 0 | Status: SHIPPED | OUTPATIENT
Start: 2021-12-29

## 2021-12-29 RX ORDER — PREDNISONE 1 MG/1
TABLET ORAL
COMMUNITY
Start: 2021-11-10 | End: 2022-01-07

## 2021-12-29 RX ORDER — EMPAGLIFLOZIN 25 MG/1
TABLET, FILM COATED ORAL
Qty: 90 TABLET | Refills: 0 | Status: SHIPPED | OUTPATIENT
Start: 2021-12-29

## 2021-12-29 NOTE — TELEPHONE ENCOUNTER
Please let patient or caregiver know or leave message that refill request for diabetic meds has/have come from the pharmacy. The refills have been sent. She is due for a 3 month f/u on the diabetes. Please help her set this up in the next week or so.   Per Gallardo

## 2022-01-06 ENCOUNTER — OFFICE VISIT (OUTPATIENT)
Dept: FAMILY MEDICINE CLINIC | Facility: CLINIC | Age: 62
End: 2022-01-06
Payer: COMMERCIAL

## 2022-01-06 ENCOUNTER — HOSPITAL ENCOUNTER (OUTPATIENT)
Dept: GENERAL RADIOLOGY | Age: 62
Discharge: HOME | End: 2022-01-06
Attending: FAMILY MEDICINE
Payer: COMMERCIAL

## 2022-01-06 VITALS
TEMPERATURE: 98 F | HEART RATE: 74 BPM | SYSTOLIC BLOOD PRESSURE: 130 MMHG | DIASTOLIC BLOOD PRESSURE: 88 MMHG | BODY MASS INDEX: 40 KG/M2 | WEIGHT: 238 LBS | OXYGEN SATURATION: 98 %

## 2022-01-06 DIAGNOSIS — Z12.31 ENCOUNTER FOR SCREENING MAMMOGRAM FOR MALIGNANT NEOPLASM OF BREAST: ICD-10-CM

## 2022-01-06 DIAGNOSIS — E11.9 TYPE 2 DIABETES MELLITUS WITHOUT COMPLICATION, WITHOUT LONG-TERM CURRENT USE OF INSULIN (HCC): ICD-10-CM

## 2022-01-06 DIAGNOSIS — M25.511 RIGHT SHOULDER PAIN, UNSPECIFIED CHRONICITY: Primary | ICD-10-CM

## 2022-01-06 DIAGNOSIS — M25.511 RIGHT SHOULDER PAIN, UNSPECIFIED CHRONICITY: ICD-10-CM

## 2022-01-06 PROCEDURE — 73030 X-RAY EXAM OF SHOULDER: CPT | Performed by: FAMILY MEDICINE

## 2022-01-06 PROCEDURE — 99214 OFFICE O/P EST MOD 30 MIN: CPT | Performed by: FAMILY MEDICINE

## 2022-01-06 PROCEDURE — 3046F HEMOGLOBIN A1C LEVEL >9.0%: CPT | Performed by: FAMILY MEDICINE

## 2022-01-06 PROCEDURE — 83036 HEMOGLOBIN GLYCOSYLATED A1C: CPT | Performed by: FAMILY MEDICINE

## 2022-01-06 PROCEDURE — 3079F DIAST BP 80-89 MM HG: CPT | Performed by: FAMILY MEDICINE

## 2022-01-06 PROCEDURE — 3075F SYST BP GE 130 - 139MM HG: CPT | Performed by: FAMILY MEDICINE

## 2022-01-06 NOTE — PROGRESS NOTES
Renetta Clark is a 62year old female.     CC:  Patient presents with:  Shoulder Pain: per pt- right, x2 months      HPI:  The patient has noted musculoskeletal pain:  Location: right  shoulder  Duration: 2 month(s)  Injury: none  Other joints similarly TABLET BY MOUTH DAILY 90 tablet 1   • acarbose 25 MG Oral Tab TAKE 1 TABLET DAILY WITH BREAKFAST FOR 1 WEEK THEN TAKE 1 TABLET WITH BREAKFAST AND DINNER FOR 1 WEEK THEN TAKE 1 TABLET 3 TIMES DAILY WITH MEALS 90 tablet 1   • Fluticasone Furoate-Vilanterol ( Social History    Tobacco Use      Smoking status: Former Smoker        Quit date:         Years since quittin.0      Smokeless tobacco: Never Used    Vaping Use      Vaping Use: Never used    Alcohol use: Not Currently    Drug use: Never ([facility] [address]  Await results     - HEMOGLOBIN A1C      Orders for this visit:    Orders Placed This Encounter      Hemoglobin A1C          Standing Status: Future          Number of Occurrences: 1          Standing Expiration Date: 1/6/2023      PABLO SCREENING KARIME

## 2022-01-07 ENCOUNTER — TELEPHONE (OUTPATIENT)
Dept: FAMILY MEDICINE CLINIC | Facility: CLINIC | Age: 62
End: 2022-01-07

## 2022-01-07 DIAGNOSIS — M19.011 ARTHRITIS OF RIGHT ACROMIOCLAVICULAR JOINT: ICD-10-CM

## 2022-01-07 DIAGNOSIS — M25.511 RIGHT SHOULDER PAIN, UNSPECIFIED CHRONICITY: Primary | ICD-10-CM

## 2022-01-07 DIAGNOSIS — E11.9 TYPE 2 DIABETES MELLITUS WITHOUT COMPLICATION, WITHOUT LONG-TERM CURRENT USE OF INSULIN (HCC): Primary | ICD-10-CM

## 2022-01-07 LAB
EST. AVERAGE GLUCOSE BLD GHB EST-MCNC: 214 MG/DL (ref 68–126)
HBA1C MFR BLD: 9.1 % (ref ?–5.7)

## 2022-01-07 RX ORDER — MELOXICAM 15 MG/1
15 TABLET ORAL DAILY
Qty: 30 TABLET | Refills: 0 | Status: SHIPPED | OUTPATIENT
Start: 2022-01-07 | End: 2022-02-04

## 2022-01-07 NOTE — TELEPHONE ENCOUNTER
Patient has seen the my chart message. She did not know how to respond. She would like to try the Mobic and physical therpay  Can you please place the order PT. Rx for Mobic has been loaded.

## 2022-01-07 NOTE — TELEPHONE ENCOUNTER
Patient has reviewed my chart message. She is unable to use Ozempic.    Patient would like a referral for Endo

## 2022-01-07 NOTE — TELEPHONE ENCOUNTER
----- Message from Erika Lu MD sent at 1/7/2022  7:56 AM CST -----  Please let patient or caregiver know or leave message that:   Her a1c has elevated to 9.1.  She is at a point where she needs to see Endocrinology or start injection therapy with a no

## 2022-01-07 NOTE — TELEPHONE ENCOUNTER
Spoke to pt advising TJ sent medication and PT order was placed. Verbally understood with no further questions.

## 2022-01-07 NOTE — TELEPHONE ENCOUNTER
----- Message from Tamar Rodriguez MD sent at 1/7/2022  7:07 AM CST -----  Please let patient or caregiver know or leave message that:   Her shoulder xray does show mild arthritis at the [facility] joint.  Our options would be to have her take Mobic 15 mg, 1 tab nisa

## 2022-01-07 NOTE — TELEPHONE ENCOUNTER
Please let patient or caregiver know or leave message that: Mobic has been prescribed as has the PT.   Thanks

## 2022-01-07 NOTE — TELEPHONE ENCOUNTER
I have placed a referral to:  Referred to Provider Information:  Provider Address Phone   Ant Patel MD [place] [address] [facility] [phone]      Anyone in the group will be fine to see.     Thanks

## 2022-01-14 ENCOUNTER — TELEPHONE (OUTPATIENT)
Dept: FAMILY MEDICINE CLINIC | Facility: CLINIC | Age: 62
End: 2022-01-14

## 2022-01-24 ENCOUNTER — HOSPITAL ENCOUNTER (OUTPATIENT)
Dept: MAMMOGRAPHY | Age: 62
Discharge: HOME | End: 2022-01-24
Attending: FAMILY MEDICINE
Payer: COMMERCIAL

## 2022-01-24 DIAGNOSIS — Z12.31 ENCOUNTER FOR SCREENING MAMMOGRAM FOR MALIGNANT NEOPLASM OF BREAST: ICD-10-CM

## 2022-01-24 PROCEDURE — 77067 SCR MAMMO BI INCL CAD: CPT | Performed by: FAMILY MEDICINE

## 2022-02-04 RX ORDER — MELOXICAM 15 MG/1
TABLET ORAL
Qty: 30 TABLET | Refills: 1 | Status: SHIPPED | OUTPATIENT
Start: 2022-02-04 | End: 2022-03-22

## 2022-02-04 RX ORDER — ACARBOSE 25 MG/1
TABLET ORAL
Qty: 90 TABLET | Refills: 1 | Status: SHIPPED | OUTPATIENT
Start: 2022-02-04 | End: 2022-03-02

## 2022-02-04 RX ORDER — NYSTATIN 100000 U/G
CREAM TOPICAL
Qty: 30 G | Refills: 1 | Status: SHIPPED | OUTPATIENT
Start: 2022-02-04

## 2022-02-04 NOTE — TELEPHONE ENCOUNTER
Diabetic Medication Protocol Failed 02/04/2022 09:34 AM   Protocol Details  Last HgBA1C < 7.5    HgBA1C procedure resulted in past 6 months    Microalbumin procedure in past 12 months or taking ACE/ARB    Appointment in past 6 or next 3 months        Routing to provider per protocol. acarbose 25 MG Oral Tab  Last refilled on 9/13/21 for #90  with 1 rf. Meloxicam (MOBIC) 15 MG Oral Tab  Last refilled on 1/7/22 for #30  with 0 rf.    nystatin [id number] UNIT/GM External Cream  Last refilled on 9/24/21 for #30  with 2 rf. Last labs 1/6/22. Last seen on 1/6/22. Future Appointments   Date Time Provider Duong Sales   3/2/2022  1:00 PM Vidhya Cotton MD [address] [place] [address]          Thank you.

## 2022-02-08 ENCOUNTER — TELEPHONE (OUTPATIENT)
Dept: FAMILY MEDICINE CLINIC | Facility: CLINIC | Age: 62
End: 2022-02-08

## 2022-02-08 NOTE — TELEPHONE ENCOUNTER
Pt would like a call back regarding an update on referral for physical therapy     Pt call back # [phone]    Thank you

## 2022-02-11 NOTE — TELEPHONE ENCOUNTER
Patient advised that PT has been approved. Number to schedule has been sent to patient via my chart.

## 2022-02-15 ENCOUNTER — TELEPHONE (OUTPATIENT)
Dept: PHYSICAL THERAPY | Age: 62
End: 2022-02-15

## 2022-02-17 ENCOUNTER — TELEPHONE (OUTPATIENT)
Dept: PHYSICAL THERAPY | Age: 62
End: 2022-02-17

## 2022-02-25 ENCOUNTER — TELEPHONE (OUTPATIENT)
Dept: PHYSICAL THERAPY | Facility: HOSPITAL | Age: 62
End: 2022-02-25

## 2022-03-01 ENCOUNTER — OFFICE VISIT (OUTPATIENT)
Dept: PHYSICAL THERAPY | Age: 62
End: 2022-03-01
Attending: FAMILY MEDICINE
Payer: COMMERCIAL

## 2022-03-03 ENCOUNTER — OFFICE VISIT (OUTPATIENT)
Dept: PHYSICAL THERAPY | Age: 62
End: 2022-03-03
Attending: FAMILY MEDICINE
Payer: COMMERCIAL

## 2022-03-03 DIAGNOSIS — M19.011 ARTHRITIS OF RIGHT ACROMIOCLAVICULAR JOINT: ICD-10-CM

## 2022-03-03 DIAGNOSIS — M25.511 RIGHT SHOULDER PAIN, UNSPECIFIED CHRONICITY: ICD-10-CM

## 2022-03-03 PROCEDURE — 97110 THERAPEUTIC EXERCISES: CPT

## 2022-03-03 PROCEDURE — 97162 PT EVAL MOD COMPLEX 30 MIN: CPT

## 2022-03-03 PROCEDURE — 97112 NEUROMUSCULAR REEDUCATION: CPT

## 2022-03-03 PROCEDURE — 97140 MANUAL THERAPY 1/> REGIONS: CPT

## 2022-03-08 ENCOUNTER — APPOINTMENT (OUTPATIENT)
Dept: PHYSICAL THERAPY | Age: 62
End: 2022-03-08
Attending: FAMILY MEDICINE
Payer: COMMERCIAL

## 2022-03-10 ENCOUNTER — APPOINTMENT (OUTPATIENT)
Dept: PHYSICAL THERAPY | Age: 62
End: 2022-03-10
Attending: FAMILY MEDICINE
Payer: COMMERCIAL

## 2022-03-15 ENCOUNTER — APPOINTMENT (OUTPATIENT)
Dept: PHYSICAL THERAPY | Age: 62
End: 2022-03-15
Attending: FAMILY MEDICINE
Payer: COMMERCIAL

## 2022-03-17 ENCOUNTER — APPOINTMENT (OUTPATIENT)
Dept: PHYSICAL THERAPY | Age: 62
End: 2022-03-17
Attending: FAMILY MEDICINE
Payer: COMMERCIAL

## 2022-03-18 ENCOUNTER — HOSPITAL ENCOUNTER (OUTPATIENT)
Age: 62
Discharge: HOME | End: 2022-03-18
Payer: COMMERCIAL

## 2022-03-18 VITALS
OXYGEN SATURATION: 98 % | HEART RATE: 60 BPM | HEIGHT: 65 IN | RESPIRATION RATE: 14 BRPM | TEMPERATURE: 97 F | BODY MASS INDEX: 39.15 KG/M2 | DIASTOLIC BLOOD PRESSURE: 70 MMHG | WEIGHT: 235 LBS | SYSTOLIC BLOOD PRESSURE: 151 MMHG

## 2022-03-18 DIAGNOSIS — H60.502 ACUTE OTITIS EXTERNA OF LEFT EAR, UNSPECIFIED TYPE: Primary | ICD-10-CM

## 2022-03-18 PROCEDURE — 99213 OFFICE O/P EST LOW 20 MIN: CPT | Performed by: NURSE PRACTITIONER

## 2022-03-18 RX ORDER — NEOMYCIN SULFATE, POLYMYXIN B SULFATE AND HYDROCORTISONE 10; 3.5; 1 MG/ML; MG/ML; [USP'U]/ML
SUSPENSION/ DROPS AURICULAR (OTIC)
Qty: 10 ML | Refills: 0 | Status: SHIPPED | OUTPATIENT
Start: 2022-03-18 | End: 2022-03-23

## 2022-03-22 ENCOUNTER — APPOINTMENT (OUTPATIENT)
Dept: PHYSICAL THERAPY | Age: 62
End: 2022-03-22
Attending: FAMILY MEDICINE
Payer: COMMERCIAL

## 2022-03-22 ENCOUNTER — OFFICE VISIT (OUTPATIENT)
Dept: FAMILY MEDICINE CLINIC | Facility: CLINIC | Age: 62
End: 2022-03-22
Payer: COMMERCIAL

## 2022-03-22 VITALS
WEIGHT: 241 LBS | DIASTOLIC BLOOD PRESSURE: 80 MMHG | SYSTOLIC BLOOD PRESSURE: 130 MMHG | OXYGEN SATURATION: 97 % | TEMPERATURE: 98 F | HEART RATE: 63 BPM | BODY MASS INDEX: 40 KG/M2

## 2022-03-22 DIAGNOSIS — K76.0 FATTY LIVER: ICD-10-CM

## 2022-03-22 DIAGNOSIS — K11.20 PAROTIDITIS: ICD-10-CM

## 2022-03-22 DIAGNOSIS — E11.9 TYPE 2 DIABETES MELLITUS WITHOUT COMPLICATION, WITHOUT LONG-TERM CURRENT USE OF INSULIN (HCC): ICD-10-CM

## 2022-03-22 DIAGNOSIS — I10 HYPERTENSION, UNSPECIFIED TYPE: ICD-10-CM

## 2022-03-22 DIAGNOSIS — Z00.00 WELL ADULT EXAM: Primary | ICD-10-CM

## 2022-03-22 DIAGNOSIS — E78.5 HYPERLIPIDEMIA, UNSPECIFIED HYPERLIPIDEMIA TYPE: ICD-10-CM

## 2022-03-22 DIAGNOSIS — F41.9 ANXIETY: ICD-10-CM

## 2022-03-22 DIAGNOSIS — D86.9 SARCOID: ICD-10-CM

## 2022-03-22 DIAGNOSIS — E03.9 HYPOTHYROIDISM, UNSPECIFIED TYPE: ICD-10-CM

## 2022-03-22 DIAGNOSIS — K21.9 GASTROESOPHAGEAL REFLUX DISEASE, UNSPECIFIED WHETHER ESOPHAGITIS PRESENT: ICD-10-CM

## 2022-03-22 LAB
ALT SERPL-CCNC: 35 U/L
ANION GAP SERPL CALC-SCNC: 7 MMOL/L (ref 0–18)
AST SERPL-CCNC: 21 U/L (ref 15–37)
BUN BLD-MCNC: 14 MG/DL (ref 7–18)
CALCIUM BLD-MCNC: 9.4 MG/DL (ref 8.5–10.1)
CHLORIDE SERPL-SCNC: 105 MMOL/L (ref 98–112)
CHOLEST SERPL-MCNC: 143 MG/DL (ref ?–200)
CO2 SERPL-SCNC: 28 MMOL/L (ref 21–32)
CREAT BLD-MCNC: 0.65 MG/DL
CREAT UR-SCNC: 46.7 MG/DL
ERYTHROCYTE [DISTWIDTH] IN BLOOD BY AUTOMATED COUNT: 13.2 %
FASTING PATIENT LIPID ANSWER: NO
FASTING STATUS PATIENT QL REPORTED: NO
GLUCOSE BLD-MCNC: 196 MG/DL (ref 70–99)
HCT VFR BLD AUTO: 43.1 %
HDLC SERPL-MCNC: 40 MG/DL (ref 40–59)
HGB BLD-MCNC: 13.5 G/DL
LDLC SERPL CALC-MCNC: 71 MG/DL (ref ?–100)
MCH RBC QN AUTO: 26.8 PG (ref 26–34)
MCHC RBC AUTO-ENTMCNC: 31.3 G/DL (ref 31–37)
MCV RBC AUTO: 85.7 FL
MICROALBUMIN UR-MCNC: 0.54 MG/DL
MICROALBUMIN/CREAT 24H UR-RTO: 11.6 UG/MG (ref ?–30)
NONHDLC SERPL-MCNC: 103 MG/DL (ref ?–130)
OSMOLALITY SERPL CALC.SUM OF ELEC: 296 MOSM/KG (ref 275–295)
PLATELET # BLD AUTO: 223 10(3)UL (ref 150–450)
POTASSIUM SERPL-SCNC: 4.1 MMOL/L (ref 3.5–5.1)
RBC # BLD AUTO: 5.03 X10(6)UL
SODIUM SERPL-SCNC: 140 MMOL/L (ref 136–145)
T3FREE SERPL-MCNC: 2.45 PG/ML (ref 2.4–4.2)
T4 FREE SERPL-MCNC: 1 NG/DL (ref 0.8–1.7)
TRIGL SERPL-MCNC: 189 MG/DL (ref 30–149)
TSI SER-ACNC: 0.88 MIU/ML (ref 0.36–3.74)
VLDLC SERPL CALC-MCNC: 29 MG/DL (ref 0–30)
WBC # BLD AUTO: 7.5 X10(3) UL (ref 4–11)

## 2022-03-22 PROCEDURE — 84481 FREE ASSAY (FT-3): CPT | Performed by: FAMILY MEDICINE

## 2022-03-22 PROCEDURE — G0438 PPPS, INITIAL VISIT: HCPCS | Performed by: FAMILY MEDICINE

## 2022-03-22 PROCEDURE — 99396 PREV VISIT EST AGE 40-64: CPT | Performed by: FAMILY MEDICINE

## 2022-03-22 PROCEDURE — 3061F NEG MICROALBUMINURIA REV: CPT | Performed by: FAMILY MEDICINE

## 2022-03-22 PROCEDURE — 84460 ALANINE AMINO (ALT) (SGPT): CPT | Performed by: FAMILY MEDICINE

## 2022-03-22 PROCEDURE — 82570 ASSAY OF URINE CREATININE: CPT | Performed by: FAMILY MEDICINE

## 2022-03-22 PROCEDURE — 84439 ASSAY OF FREE THYROXINE: CPT | Performed by: FAMILY MEDICINE

## 2022-03-22 PROCEDURE — 99213 OFFICE O/P EST LOW 20 MIN: CPT | Performed by: FAMILY MEDICINE

## 2022-03-22 PROCEDURE — 84443 ASSAY THYROID STIM HORMONE: CPT | Performed by: FAMILY MEDICINE

## 2022-03-22 PROCEDURE — 85027 COMPLETE CBC AUTOMATED: CPT | Performed by: FAMILY MEDICINE

## 2022-03-22 PROCEDURE — 80061 LIPID PANEL: CPT | Performed by: FAMILY MEDICINE

## 2022-03-22 PROCEDURE — 84450 TRANSFERASE (AST) (SGOT): CPT | Performed by: FAMILY MEDICINE

## 2022-03-22 PROCEDURE — 3079F DIAST BP 80-89 MM HG: CPT | Performed by: FAMILY MEDICINE

## 2022-03-22 PROCEDURE — 82043 UR ALBUMIN QUANTITATIVE: CPT | Performed by: FAMILY MEDICINE

## 2022-03-22 PROCEDURE — 80048 BASIC METABOLIC PNL TOTAL CA: CPT | Performed by: FAMILY MEDICINE

## 2022-03-22 PROCEDURE — 3075F SYST BP GE 130 - 139MM HG: CPT | Performed by: FAMILY MEDICINE

## 2022-03-22 RX ORDER — MOXIFLOXACIN HYDROCHLORIDE 400 MG/1
400 TABLET ORAL DAILY
Qty: 10 TABLET | Refills: 0 | Status: SHIPPED | OUTPATIENT
Start: 2022-03-22 | End: 2022-04-01

## 2022-03-24 ENCOUNTER — APPOINTMENT (OUTPATIENT)
Dept: PHYSICAL THERAPY | Age: 62
End: 2022-03-24
Attending: FAMILY MEDICINE
Payer: COMMERCIAL

## 2022-05-03 ENCOUNTER — TELEPHONE (OUTPATIENT)
Dept: FAMILY MEDICINE CLINIC | Facility: CLINIC | Age: 62
End: 2022-05-03

## 2022-05-03 NOTE — TELEPHONE ENCOUNTER
PATIENT CALLING STATING THAT SHE DOESN'T KNOW IF DR MARCIAL WOULD LIKE HER YO COME IN OR TALK ABOUT HER SITUATION. WHEN SHE GOES TO THE BATHROOM, HER BELLYBUTTON POPS OUT AND WHEN SHE PUSHES IT IN IT MAKES A KIND OF SQUISHY NOISE. POSSIBLE HERNIA. PLEASE ADVISE.

## 2022-05-03 NOTE — TELEPHONE ENCOUNTER
Pt scheduled   Future Appointments   Date Time Provider Duong Sales   5/12/2022 11:40 AM Trista Mendoza MD [facility] [place]

## 2022-05-19 ENCOUNTER — OFFICE VISIT (OUTPATIENT)
Dept: FAMILY MEDICINE CLINIC | Facility: CLINIC | Age: 62
End: 2022-05-19
Payer: COMMERCIAL

## 2022-05-19 VITALS
SYSTOLIC BLOOD PRESSURE: 130 MMHG | HEART RATE: 70 BPM | OXYGEN SATURATION: 96 % | WEIGHT: 238 LBS | BODY MASS INDEX: 40 KG/M2 | DIASTOLIC BLOOD PRESSURE: 74 MMHG | TEMPERATURE: 98 F

## 2022-05-19 DIAGNOSIS — K42.9 UMBILICAL HERNIA WITHOUT OBSTRUCTION AND WITHOUT GANGRENE: Primary | ICD-10-CM

## 2022-05-19 DIAGNOSIS — R05.9 COUGH: ICD-10-CM

## 2022-05-19 PROCEDURE — 99214 OFFICE O/P EST MOD 30 MIN: CPT | Performed by: FAMILY MEDICINE

## 2022-05-19 PROCEDURE — 3078F DIAST BP <80 MM HG: CPT | Performed by: FAMILY MEDICINE

## 2022-05-19 PROCEDURE — 3075F SYST BP GE 130 - 139MM HG: CPT | Performed by: FAMILY MEDICINE

## 2022-05-31 ENCOUNTER — OFFICE VISIT (OUTPATIENT)
Dept: SURGERY | Facility: CLINIC | Age: 62
End: 2022-05-31
Payer: COMMERCIAL

## 2022-05-31 VITALS — TEMPERATURE: 98 F | WEIGHT: 238 LBS | BODY MASS INDEX: 39.65 KG/M2 | HEIGHT: 65 IN | HEART RATE: 68 BPM

## 2022-05-31 DIAGNOSIS — K43.9 VENTRAL HERNIA WITHOUT OBSTRUCTION OR GANGRENE: Primary | ICD-10-CM

## 2022-05-31 PROCEDURE — 99244 OFF/OP CNSLTJ NEW/EST MOD 40: CPT | Performed by: SURGERY

## 2022-05-31 PROCEDURE — 3008F BODY MASS INDEX DOCD: CPT | Performed by: SURGERY

## 2022-06-09 RX ORDER — CITALOPRAM 20 MG/1
20 TABLET ORAL DAILY
Qty: 90 TABLET | Refills: 3 | Status: SHIPPED | OUTPATIENT
Start: 2022-06-09 | End: 2022-11-10

## 2022-06-13 ENCOUNTER — TELEPHONE (OUTPATIENT)
Facility: LOCATION | Age: 62
End: 2022-06-13

## 2022-06-14 ENCOUNTER — TELEPHONE (OUTPATIENT)
Facility: LOCATION | Age: 62
End: 2022-06-14

## 2022-06-16 ENCOUNTER — EKG ENCOUNTER (OUTPATIENT)
Dept: LAB | Age: 62
End: 2022-06-16
Attending: SURGERY
Payer: COMMERCIAL

## 2022-06-16 DIAGNOSIS — K43.9 VENTRAL HERNIA WITHOUT OBSTRUCTION OR GANGRENE: ICD-10-CM

## 2022-06-16 LAB
ATRIAL RATE: 58 BPM
P AXIS: 17 DEGREES
P-R INTERVAL: 136 MS
Q-T INTERVAL: 416 MS
QRS DURATION: 96 MS
QTC CALCULATION (BEZET): 408 MS
R AXIS: 4 DEGREES
T AXIS: 36 DEGREES
VENTRICULAR RATE: 58 BPM

## 2022-06-16 PROCEDURE — 93005 ELECTROCARDIOGRAM TRACING: CPT

## 2022-06-16 PROCEDURE — 93010 ELECTROCARDIOGRAM REPORT: CPT | Performed by: INTERNAL MEDICINE

## 2022-06-20 ENCOUNTER — LAB ENCOUNTER (OUTPATIENT)
Dept: LAB | Age: 62
End: 2022-06-20
Attending: SURGERY
Payer: COMMERCIAL

## 2022-06-20 DIAGNOSIS — K43.9 VENTRAL HERNIA WITHOUT OBSTRUCTION OR GANGRENE: ICD-10-CM

## 2022-06-20 LAB — SARS-COV-2 RNA RESP QL NAA+PROBE: NOT DETECTED

## 2022-06-21 ENCOUNTER — LABORATORY ENCOUNTER (OUTPATIENT)
Dept: LAB | Age: 62
End: 2022-06-21
Attending: SURGERY
Payer: COMMERCIAL

## 2022-06-21 DIAGNOSIS — K43.9 VENTRAL HERNIA WITHOUT OBSTRUCTION OR GANGRENE: ICD-10-CM

## 2022-06-21 LAB
CHLORIDE SERPL-SCNC: 106 MMOL/L (ref 98–112)
CO2 SERPL-SCNC: 25 MMOL/L (ref 21–32)
POTASSIUM SERPL-SCNC: 4 MMOL/L (ref 3.5–5.1)
SODIUM SERPL-SCNC: 136 MMOL/L (ref 136–145)

## 2022-06-21 PROCEDURE — 80051 ELECTROLYTE PANEL: CPT

## 2022-06-21 PROCEDURE — 36415 COLL VENOUS BLD VENIPUNCTURE: CPT

## 2022-06-22 ENCOUNTER — HOSPITAL ENCOUNTER (OUTPATIENT)
Facility: HOSPITAL | Age: 62
Setting detail: HOSPITAL OUTPATIENT SURGERY
Discharge: HOME | End: 2022-06-22
Attending: SURGERY | Admitting: SURGERY
Payer: COMMERCIAL

## 2022-06-22 ENCOUNTER — ANESTHESIA (OUTPATIENT)
Dept: SURGERY | Facility: HOSPITAL | Age: 62
End: 2022-06-22
Payer: COMMERCIAL

## 2022-06-22 ENCOUNTER — ANESTHESIA EVENT (OUTPATIENT)
Dept: SURGERY | Facility: HOSPITAL | Age: 62
End: 2022-06-22
Payer: COMMERCIAL

## 2022-06-22 VITALS
SYSTOLIC BLOOD PRESSURE: 124 MMHG | HEART RATE: 57 BPM | TEMPERATURE: 98 F | OXYGEN SATURATION: 95 % | WEIGHT: 239 LBS | BODY MASS INDEX: 39.82 KG/M2 | DIASTOLIC BLOOD PRESSURE: 53 MMHG | RESPIRATION RATE: 20 BRPM | HEIGHT: 65 IN

## 2022-06-22 DIAGNOSIS — K43.9 VENTRAL HERNIA WITHOUT OBSTRUCTION OR GANGRENE: Primary | ICD-10-CM

## 2022-06-22 LAB
GLUCOSE BLD-MCNC: 128 MG/DL (ref 70–99)
GLUCOSE BLD-MCNC: 164 MG/DL (ref 70–99)

## 2022-06-22 PROCEDURE — 0WUF0JZ SUPPLEMENT ABDOMINAL WALL WITH SYNTHETIC SUBSTITUTE, OPEN APPROACH: ICD-10-PCS | Performed by: SURGERY

## 2022-06-22 PROCEDURE — 82962 GLUCOSE BLOOD TEST: CPT

## 2022-06-22 PROCEDURE — 88302 TISSUE EXAM BY PATHOLOGIST: CPT | Performed by: SURGERY

## 2022-06-22 DEVICE — VENTRALEX ST HERNIA PATCH
Type: IMPLANTABLE DEVICE | Site: ABDOMEN | Status: FUNCTIONAL
Brand: VENTRALEX ST HERNIA PATCH

## 2022-06-22 RX ORDER — HEPARIN SODIUM 5000 [USP'U]/ML
5000 INJECTION, SOLUTION INTRAVENOUS; SUBCUTANEOUS ONCE
Status: COMPLETED | OUTPATIENT
Start: 2022-06-22 | End: 2022-06-22

## 2022-06-22 RX ORDER — NICOTINE POLACRILEX 4 MG
15 LOZENGE BUCCAL
Status: DISCONTINUED | OUTPATIENT
Start: 2022-06-22 | End: 2022-06-22 | Stop reason: HOSPADM

## 2022-06-22 RX ORDER — INSULIN ASPART 100 [IU]/ML
INJECTION, SOLUTION INTRAVENOUS; SUBCUTANEOUS ONCE
Status: DISCONTINUED | OUTPATIENT
Start: 2022-06-22 | End: 2022-06-22

## 2022-06-22 RX ORDER — ONDANSETRON 2 MG/ML
4 INJECTION INTRAMUSCULAR; INTRAVENOUS EVERY 6 HOURS PRN
Status: DISCONTINUED | OUTPATIENT
Start: 2022-06-22 | End: 2022-06-22

## 2022-06-22 RX ORDER — LIDOCAINE HYDROCHLORIDE 10 MG/ML
INJECTION, SOLUTION EPIDURAL; INFILTRATION; INTRACAUDAL; PERINEURAL AS NEEDED
Status: DISCONTINUED | OUTPATIENT
Start: 2022-06-22 | End: 2022-06-22 | Stop reason: SURG

## 2022-06-22 RX ORDER — ACETAMINOPHEN 500 MG
1000 TABLET ORAL ONCE AS NEEDED
Status: DISCONTINUED | OUTPATIENT
Start: 2022-06-22 | End: 2022-06-22

## 2022-06-22 RX ORDER — NALOXONE HYDROCHLORIDE 0.4 MG/ML
80 INJECTION, SOLUTION INTRAMUSCULAR; INTRAVENOUS; SUBCUTANEOUS AS NEEDED
Status: DISCONTINUED | OUTPATIENT
Start: 2022-06-22 | End: 2022-06-22

## 2022-06-22 RX ORDER — ACETAMINOPHEN 500 MG
1000 TABLET ORAL ONCE
Status: DISCONTINUED | OUTPATIENT
Start: 2022-06-22 | End: 2022-06-22 | Stop reason: HOSPADM

## 2022-06-22 RX ORDER — BUPIVACAINE HYDROCHLORIDE AND EPINEPHRINE 5; 5 MG/ML; UG/ML
INJECTION, SOLUTION EPIDURAL; INTRACAUDAL; PERINEURAL AS NEEDED
Status: DISCONTINUED | OUTPATIENT
Start: 2022-06-22 | End: 2022-06-22 | Stop reason: HOSPADM

## 2022-06-22 RX ORDER — ALBUTEROL SULFATE 90 UG/1
AEROSOL, METERED RESPIRATORY (INHALATION) AS NEEDED
Status: DISCONTINUED | OUTPATIENT
Start: 2022-06-22 | End: 2022-06-22 | Stop reason: SURG

## 2022-06-22 RX ORDER — HYDROCODONE BITARTRATE AND ACETAMINOPHEN 5; 325 MG/1; MG/1
2 TABLET ORAL ONCE AS NEEDED
Status: DISCONTINUED | OUTPATIENT
Start: 2022-06-22 | End: 2022-06-22

## 2022-06-22 RX ORDER — NYSTATIN 100000 U/G
CREAM TOPICAL
Qty: 30 G | Refills: 0 | Status: SHIPPED | OUTPATIENT
Start: 2022-06-22

## 2022-06-22 RX ORDER — HYDROMORPHONE HYDROCHLORIDE 1 MG/ML
0.6 INJECTION, SOLUTION INTRAMUSCULAR; INTRAVENOUS; SUBCUTANEOUS EVERY 5 MIN PRN
Status: DISCONTINUED | OUTPATIENT
Start: 2022-06-22 | End: 2022-06-22

## 2022-06-22 RX ORDER — KETAMINE HYDROCHLORIDE 50 MG/ML
INJECTION, SOLUTION, CONCENTRATE INTRAMUSCULAR; INTRAVENOUS AS NEEDED
Status: DISCONTINUED | OUTPATIENT
Start: 2022-06-22 | End: 2022-06-22 | Stop reason: SURG

## 2022-06-22 RX ORDER — CLINDAMYCIN PHOSPHATE 900 MG/50ML
900 INJECTION INTRAVENOUS ONCE
Status: COMPLETED | OUTPATIENT
Start: 2022-06-22 | End: 2022-06-22

## 2022-06-22 RX ORDER — GLYCOPYRROLATE 0.2 MG/ML
INJECTION, SOLUTION INTRAMUSCULAR; INTRAVENOUS AS NEEDED
Status: DISCONTINUED | OUTPATIENT
Start: 2022-06-22 | End: 2022-06-22 | Stop reason: SURG

## 2022-06-22 RX ORDER — HYDROMORPHONE HYDROCHLORIDE 1 MG/ML
0.4 INJECTION, SOLUTION INTRAMUSCULAR; INTRAVENOUS; SUBCUTANEOUS EVERY 5 MIN PRN
Status: DISCONTINUED | OUTPATIENT
Start: 2022-06-22 | End: 2022-06-22

## 2022-06-22 RX ORDER — PROCHLORPERAZINE EDISYLATE 5 MG/ML
5 INJECTION INTRAMUSCULAR; INTRAVENOUS EVERY 8 HOURS PRN
Status: DISCONTINUED | OUTPATIENT
Start: 2022-06-22 | End: 2022-06-22

## 2022-06-22 RX ORDER — ROCURONIUM BROMIDE 10 MG/ML
INJECTION, SOLUTION INTRAVENOUS AS NEEDED
Status: DISCONTINUED | OUTPATIENT
Start: 2022-06-22 | End: 2022-06-22 | Stop reason: SURG

## 2022-06-22 RX ORDER — SCOLOPAMINE TRANSDERMAL SYSTEM 1 MG/1
1 PATCH, EXTENDED RELEASE TRANSDERMAL ONCE
Status: DISCONTINUED | OUTPATIENT
Start: 2022-06-22 | End: 2022-06-22 | Stop reason: HOSPADM

## 2022-06-22 RX ORDER — SODIUM CHLORIDE, SODIUM LACTATE, POTASSIUM CHLORIDE, CALCIUM CHLORIDE 600; 310; 30; 20 MG/100ML; MG/100ML; MG/100ML; MG/100ML
INJECTION, SOLUTION INTRAVENOUS CONTINUOUS
Status: DISCONTINUED | OUTPATIENT
Start: 2022-06-22 | End: 2022-06-22

## 2022-06-22 RX ORDER — NEOSTIGMINE METHYLSULFATE 1 MG/ML
INJECTION INTRAVENOUS AS NEEDED
Status: DISCONTINUED | OUTPATIENT
Start: 2022-06-22 | End: 2022-06-22 | Stop reason: SURG

## 2022-06-22 RX ORDER — ONDANSETRON 2 MG/ML
INJECTION INTRAMUSCULAR; INTRAVENOUS AS NEEDED
Status: DISCONTINUED | OUTPATIENT
Start: 2022-06-22 | End: 2022-06-22 | Stop reason: SURG

## 2022-06-22 RX ORDER — NICOTINE POLACRILEX 4 MG
30 LOZENGE BUCCAL
Status: DISCONTINUED | OUTPATIENT
Start: 2022-06-22 | End: 2022-06-22 | Stop reason: HOSPADM

## 2022-06-22 RX ORDER — DEXTROSE MONOHYDRATE 25 G/50ML
50 INJECTION, SOLUTION INTRAVENOUS
Status: DISCONTINUED | OUTPATIENT
Start: 2022-06-22 | End: 2022-06-22 | Stop reason: HOSPADM

## 2022-06-22 RX ORDER — HYDROCODONE BITARTRATE AND ACETAMINOPHEN 5; 325 MG/1; MG/1
1 TABLET ORAL ONCE AS NEEDED
Status: DISCONTINUED | OUTPATIENT
Start: 2022-06-22 | End: 2022-06-22

## 2022-06-22 RX ORDER — KETOROLAC TROMETHAMINE 30 MG/ML
INJECTION, SOLUTION INTRAMUSCULAR; INTRAVENOUS AS NEEDED
Status: DISCONTINUED | OUTPATIENT
Start: 2022-06-22 | End: 2022-06-22 | Stop reason: SURG

## 2022-06-22 RX ORDER — HYDROMORPHONE HYDROCHLORIDE 1 MG/ML
0.2 INJECTION, SOLUTION INTRAMUSCULAR; INTRAVENOUS; SUBCUTANEOUS EVERY 5 MIN PRN
Status: DISCONTINUED | OUTPATIENT
Start: 2022-06-22 | End: 2022-06-22

## 2022-06-22 RX ORDER — ALBUTEROL SULFATE 2.5 MG/3ML
2.5 SOLUTION RESPIRATORY (INHALATION) AS NEEDED
Status: DISCONTINUED | OUTPATIENT
Start: 2022-06-22 | End: 2022-06-22

## 2022-06-22 RX ORDER — HYDROCODONE BITARTRATE AND ACETAMINOPHEN 5; 325 MG/1; MG/1
1 TABLET ORAL EVERY 6 HOURS PRN
Qty: 15 TABLET | Refills: 0 | Status: SHIPPED | OUTPATIENT
Start: 2022-06-22

## 2022-06-22 RX ORDER — HYDROMORPHONE HYDROCHLORIDE 1 MG/ML
INJECTION, SOLUTION INTRAMUSCULAR; INTRAVENOUS; SUBCUTANEOUS
Status: COMPLETED
Start: 2022-06-22 | End: 2022-06-22

## 2022-06-22 RX ADMIN — GLYCOPYRROLATE 0.8 MG: 0.2 INJECTION, SOLUTION INTRAMUSCULAR; INTRAVENOUS at 11:50:00

## 2022-06-22 RX ADMIN — SODIUM CHLORIDE, SODIUM LACTATE, POTASSIUM CHLORIDE, CALCIUM CHLORIDE: 600; 310; 30; 20 INJECTION, SOLUTION INTRAVENOUS at 10:48:00

## 2022-06-22 RX ADMIN — ALBUTEROL SULFATE 4 PUFF: 90 AEROSOL, METERED RESPIRATORY (INHALATION) at 10:53:00

## 2022-06-22 RX ADMIN — NEOSTIGMINE METHYLSULFATE 5 MG: 1 INJECTION INTRAVENOUS at 11:50:00

## 2022-06-22 RX ADMIN — KETOROLAC TROMETHAMINE 30 MG: 30 INJECTION, SOLUTION INTRAMUSCULAR; INTRAVENOUS at 11:35:00

## 2022-06-22 RX ADMIN — GLYCOPYRROLATE 0.1 MG: 0.2 INJECTION, SOLUTION INTRAMUSCULAR; INTRAVENOUS at 11:00:00

## 2022-06-22 RX ADMIN — ROCURONIUM BROMIDE 50 MG: 10 INJECTION, SOLUTION INTRAVENOUS at 10:48:00

## 2022-06-22 RX ADMIN — SODIUM CHLORIDE, SODIUM LACTATE, POTASSIUM CHLORIDE, CALCIUM CHLORIDE: 600; 310; 30; 20 INJECTION, SOLUTION INTRAVENOUS at 11:40:00

## 2022-06-22 RX ADMIN — ONDANSETRON 4 MG: 2 INJECTION INTRAMUSCULAR; INTRAVENOUS at 11:35:00

## 2022-06-22 RX ADMIN — LIDOCAINE HYDROCHLORIDE 50 MG: 10 INJECTION, SOLUTION EPIDURAL; INFILTRATION; INTRACAUDAL; PERINEURAL at 10:48:00

## 2022-06-22 RX ADMIN — CLINDAMYCIN PHOSPHATE 900 MG: 900 INJECTION INTRAVENOUS at 10:56:00

## 2022-06-22 RX ADMIN — KETAMINE HYDROCHLORIDE 15 MG: 50 INJECTION, SOLUTION, CONCENTRATE INTRAMUSCULAR; INTRAVENOUS at 10:59:00

## 2022-06-22 NOTE — ANESTHESIA PROCEDURE NOTES
Airway  Date/Time: 6/22/2022 10:52 AM  Urgency: elective    Airway not difficult    General Information and Staff    Patient location during procedure: OR  Anesthesiologist: Lakisha Green MD  Resident/CRNA: Flaquita Farias CRNA  Performed: CRNA     Indications and Patient Condition  Indications for airway management: anesthesia  Spontaneous Ventilation: absent  Sedation level: deep  Preoxygenated: yes  Patient position: sniffing  Mask difficulty assessment: 1 - vent by mask    Final Airway Details  Final airway type: endotracheal airway      Successful airway: ETT  Cuffed: yes   Successful intubation technique: direct laryngoscopy  Endotracheal tube insertion site: oral  Blade: Nancy  Blade size: #3  ETT size (mm): 7.0    Cormack-Lehane Classification: grade I - full view of glottis  Placement verified by: chest auscultation and capnometry   Measured from: lips  ETT to lips (cm): 21  Number of attempts at approach: 1

## 2022-06-23 NOTE — OPERATIVE REPORT
[facility]    PATIENT'S NAME: Alexandra Closs   ATTENDING PHYSICIAN: David Mcmahan D.O.   OPERATING PHYSICIAN: David Mcmahan D.O.   PATIENT ACCOUNT#:   [de-identified]    LOCATION:  [address]  MEDICAL RECORD #:   [id number]       YOB: 1960  ADMISSION DATE:       06/22/2022      OPERATION DATE:  06/22/2022    OPERATIVE REPORT    PREOPERATIVE DIAGNOSIS:  Incarcerated ventral hernia. POSTOPERATIVE DIAGNOSIS:  Incarcerated ventral hernia. PROCEDURE:  Ventral herniorrhaphy with mesh. ANESTHESIA:  General.    COMPLICATIONS:  None. OPERATIVE TECHNIQUE:  An informed consent was previously obtained and the patient was taken to the operative suite and placed in the supine position. General inhalational anesthetic was administered by the anesthesia department, and the anterior abdomen was prepped and draped in the usual sterile fashion. A skin incision was made 7 cm in length directly over the hernial defect. Sharp dissection carried down through the skin and subcutaneous tissue, and the hernia sac was circumferentially dissected. The sac was entered and excised demonstrating evidence of omental contents through a very small fascial defect in the abdomen. The omentum was incarcerated within the hernia sac and could not be reduced into the abdominal cavity. I placed my digit underneath the fascial edge and incised it for a distance of approximately 8 mm which allowed ample size of the fascial defect to now allow the omentum to be reduced back into the abdominal cavity. This was accomplished and the fascia cleared of omental tissue for 2 cm back from the fascial edge. A Ventralex patch was placed in the abdominal cavity. It was sutured at 8 various locations circumferentially with through and through fascial sutures through the rib of the mesh.   Posterior umbilical skin was sutured to the fascia using interrupted 3-0 Vicryl suture and skin edges approximated using 4-0 Monocryl in a running subdermal fashion. Then, 20 mL of 0.5% Marcaine plain was injected into the incision at the completion of the procedure. Mastisol, Steri-Strips were applied. The patient was transported from the operative suite to Recovery in stable condition.     Dictated By Conor Townsend D.O.  d: 06/22/2022 11:55:44  t: 06/22/2022 19:44:53  [facility] [id number]  YX/    cc: Devonna Merlin, D.O.

## 2022-07-04 RX ORDER — OMEPRAZOLE 20 MG/1
20 CAPSULE, DELAYED RELEASE ORAL
Qty: 90 CAPSULE | Refills: 2 | Status: SHIPPED | OUTPATIENT
Start: 2022-07-04 | End: 2023-04-29

## 2022-07-04 RX ORDER — LEVOTHYROXINE SODIUM 88 UG/1
88 TABLET ORAL DAILY
Qty: 90 TABLET | Refills: 2 | Status: SHIPPED | OUTPATIENT
Start: 2022-07-04 | End: 2023-04-29

## 2022-07-12 ENCOUNTER — OFFICE VISIT (OUTPATIENT)
Dept: SURGERY | Facility: CLINIC | Age: 62
End: 2022-07-12

## 2022-07-12 VITALS — WEIGHT: 239 LBS | BODY MASS INDEX: 39.82 KG/M2 | TEMPERATURE: 98 F | HEIGHT: 65 IN

## 2022-07-12 DIAGNOSIS — K43.9 VENTRAL HERNIA WITHOUT OBSTRUCTION OR GANGRENE: Primary | ICD-10-CM

## 2022-07-12 PROCEDURE — 3008F BODY MASS INDEX DOCD: CPT | Performed by: SURGERY

## 2022-07-12 PROCEDURE — 99024 POSTOP FOLLOW-UP VISIT: CPT | Performed by: SURGERY

## 2022-07-12 NOTE — PROGRESS NOTES
Patient denies complaint denies pain at the incision site.   Wound is healing nicely no evidence of residual hernia plan follow-up as needed no lifting for 4 weeks

## 2022-09-14 RX ORDER — NYSTATIN 100000 U/G
CREAM TOPICAL
Qty: 30 G | Refills: 0 | Status: SHIPPED | OUTPATIENT
Start: 2022-09-14

## 2022-09-14 NOTE — TELEPHONE ENCOUNTER
Last refilled 6/22/22 for #30g with 0 RF  LOV with TJ 5/19/22  No future appt with pcp  Protocol: none

## 2022-09-29 RX ORDER — ATORVASTATIN CALCIUM 40 MG/1
TABLET, FILM COATED ORAL
Qty: 90 TABLET | Refills: 1 | Status: SHIPPED | OUTPATIENT
Start: 2022-09-29

## 2022-10-14 ENCOUNTER — TELEPHONE (OUTPATIENT)
Dept: FAMILY MEDICINE CLINIC | Facility: CLINIC | Age: 62
End: 2022-10-14

## 2022-11-10 ENCOUNTER — TELEPHONE (OUTPATIENT)
Dept: FAMILY MEDICINE CLINIC | Facility: CLINIC | Age: 62
End: 2022-11-10

## 2022-11-10 RX ORDER — CITALOPRAM 40 MG/1
40 TABLET ORAL DAILY
Qty: 90 TABLET | Refills: 0 | Status: SHIPPED | OUTPATIENT
Start: 2022-11-10

## 2022-11-10 NOTE — TELEPHONE ENCOUNTER
We can increase her to 40 mg daily of the Citalopram. See me back in about 4 weeks after increasing the dose. Let me know if I need to send a prescription.   Thanks

## 2022-11-10 NOTE — TELEPHONE ENCOUNTER
Spoke to pt advising of TJ' recommendation. Verbally understood and ok with upping dose. Ok to send to [facility] in [address]. No need for a call back once medication is sent. No further questions.

## 2022-11-10 NOTE — TELEPHONE ENCOUNTER
PT Called she is taking   citalopram 20 MG Oral Tab       HAVING LOTS OF ANXIETY WITH FAMILY ISSUES AND WORK. She constantly anxious-shaking because of it. Doesn't know if she should have the dosage upped or what the dr recommends to do?

## 2022-11-16 ENCOUNTER — TELEPHONE (OUTPATIENT)
Facility: LOCATION | Age: 62
End: 2022-11-16

## 2022-11-16 ENCOUNTER — TELEPHONE (OUTPATIENT)
Dept: FAMILY MEDICINE CLINIC | Facility: CLINIC | Age: 62
End: 2022-11-16

## 2022-11-16 NOTE — TELEPHONE ENCOUNTER
Spoke with patient, patient had hernia surgery  On 7/12/22. Scar is in belly button and patient is c/o pain in waist ban area x 3-4 weeks. Patient would like to know if she should pf/u with [facility] or f/u with surgeons office?     Please advise     Thank you

## 2022-11-16 NOTE — TELEPHONE ENCOUNTER
Pt had hernia surgery July 12, and she is not sure if scar tissue is causing pain but 3 inches to left and a little up of scar she is having grabbing pain when she bends over. Pt has all day long pain if she is wearing a waistband that hits area. Going on 3-4 weeks. Should she see surgeon or Dr. Mary Rene? Please advise. Thank you!

## 2022-11-18 RX ORDER — LOSARTAN POTASSIUM 25 MG/1
TABLET ORAL
Qty: 90 TABLET | Refills: 2 | Status: SHIPPED | OUTPATIENT
Start: 2022-11-18

## 2022-11-18 NOTE — TELEPHONE ENCOUNTER
Pt failed refill protocol for the following reasons:     Hypertension Medications Protocol Failed 11/18/2022 10:14 AM   Protocol Details  Appointment in past 6 or next 3 months            Last refill: 10/07/2021 #90 with 3 refills  Last appt: 5/19/2022  Next appt: Future Appointments   Date Time Provider Duong Sales   11/22/2022  3:15 PM Joe Toribio DO [facility]         Forward to Dr. Maria Coley, please advise on refills. Thank you.

## 2022-11-21 RX ORDER — NYSTATIN 100000 U/G
CREAM TOPICAL
Qty: 30 G | Refills: 0 | Status: SHIPPED | OUTPATIENT
Start: 2022-11-21

## 2022-11-22 ENCOUNTER — OFFICE VISIT (OUTPATIENT)
Facility: LOCATION | Age: 62
End: 2022-11-22
Payer: COMMERCIAL

## 2022-11-22 VITALS — TEMPERATURE: 97 F | HEART RATE: 70 BPM

## 2022-11-22 DIAGNOSIS — R10.9 ABDOMINAL PAIN, UNSPECIFIED ABDOMINAL LOCATION: Primary | ICD-10-CM

## 2022-11-22 PROCEDURE — 99212 OFFICE O/P EST SF 10 MIN: CPT | Performed by: SURGERY

## 2022-12-05 ENCOUNTER — TELEPHONE (OUTPATIENT)
Dept: FAMILY MEDICINE CLINIC | Facility: CLINIC | Age: 62
End: 2022-12-05

## 2022-12-05 ENCOUNTER — OFFICE VISIT (OUTPATIENT)
Dept: FAMILY MEDICINE CLINIC | Facility: CLINIC | Age: 62
End: 2022-12-05
Payer: COMMERCIAL

## 2022-12-05 VITALS
TEMPERATURE: 97 F | BODY MASS INDEX: 40 KG/M2 | DIASTOLIC BLOOD PRESSURE: 80 MMHG | HEART RATE: 53 BPM | OXYGEN SATURATION: 98 % | WEIGHT: 240.63 LBS | SYSTOLIC BLOOD PRESSURE: 130 MMHG | RESPIRATION RATE: 16 BRPM

## 2022-12-05 DIAGNOSIS — R68.84 JAW PAIN, NON-TMJ: ICD-10-CM

## 2022-12-05 DIAGNOSIS — H60.391 OTITIS, EXTERNA, INFECTIVE, RIGHT: Primary | ICD-10-CM

## 2022-12-05 RX ORDER — AZITHROMYCIN 250 MG/1
TABLET, FILM COATED ORAL
Qty: 6 TABLET | Refills: 0 | Status: SHIPPED | OUTPATIENT
Start: 2022-12-05 | End: 2022-12-10

## 2022-12-05 NOTE — TELEPHONE ENCOUNTER
Future Appointments   Date Time Provider Duong Sales   12/5/2022 12:00 PM Zuleima Li, [facility] [facility] Liudmila Good

## 2022-12-05 NOTE — TELEPHONE ENCOUNTER
Spoke with patient, right ear pain x 4-5 days. Itchiness/flaking skin. No discharge. No fever/cold sx. Patient would like to be seen.     Routing to covering provider    Please advise    Thank you

## 2022-12-05 NOTE — TELEPHONE ENCOUNTER
PATIENT CALLING COMPLAINING OF EAR PAIN FOR ABOUT FOUR DAYS NOW. PATIENT SAYS SHE THOUGHT IT WOULD GO AWAY. PAIN HASN'T GONE AWAY. CANT SLEEP ON EAR. NOT SURE LEFT OR RIGHT.

## 2023-01-10 ENCOUNTER — PATIENT MESSAGE (OUTPATIENT)
Dept: FAMILY MEDICINE CLINIC | Facility: CLINIC | Age: 63
End: 2023-01-10

## 2023-01-10 DIAGNOSIS — Z12.31 ENCOUNTER FOR SCREENING MAMMOGRAM FOR MALIGNANT NEOPLASM OF BREAST: Primary | ICD-10-CM

## 2023-01-10 NOTE — TELEPHONE ENCOUNTER
From: Keisha Muñoz  To:  Tamar Rodriguez MD  Sent: 1/10/2023 10:21 AM CST  Subject: Zeynep Lockwood    I AM NOT SURE IF YOU HAVE TO PUT A REFERRAL THRU SO THAT I CAN SET UP AN APPOINTMENT FOR MY ANNUAL MAMOGRAM. PLEASE ADVISE Miki Amanda

## 2023-01-17 RX ORDER — NYSTATIN 100000 U/G
CREAM TOPICAL
Qty: 30 G | Refills: 0 | Status: SHIPPED | OUTPATIENT
Start: 2023-01-17

## 2023-01-20 ENCOUNTER — PATIENT MESSAGE (OUTPATIENT)
Dept: FAMILY MEDICINE CLINIC | Facility: CLINIC | Age: 63
End: 2023-01-20

## 2023-01-25 ENCOUNTER — HOSPITAL ENCOUNTER (OUTPATIENT)
Dept: MAMMOGRAPHY | Age: 63
Discharge: HOME | End: 2023-01-25
Attending: FAMILY MEDICINE
Payer: COMMERCIAL

## 2023-01-25 DIAGNOSIS — Z12.31 ENCOUNTER FOR SCREENING MAMMOGRAM FOR MALIGNANT NEOPLASM OF BREAST: ICD-10-CM

## 2023-01-25 PROCEDURE — 77063 BREAST TOMOSYNTHESIS BI: CPT | Performed by: FAMILY MEDICINE

## 2023-01-25 PROCEDURE — 77067 SCR MAMMO BI INCL CAD: CPT | Performed by: FAMILY MEDICINE

## 2023-02-22 ENCOUNTER — OFFICE VISIT (OUTPATIENT)
Dept: FAMILY MEDICINE CLINIC | Facility: CLINIC | Age: 63
End: 2023-02-22
Payer: COMMERCIAL

## 2023-02-22 VITALS
SYSTOLIC BLOOD PRESSURE: 120 MMHG | OXYGEN SATURATION: 96 % | BODY MASS INDEX: 40 KG/M2 | DIASTOLIC BLOOD PRESSURE: 78 MMHG | WEIGHT: 242.5 LBS | RESPIRATION RATE: 18 BRPM | TEMPERATURE: 98 F | HEART RATE: 72 BPM

## 2023-02-22 DIAGNOSIS — M77.00 MEDIAL EPICONDYLITIS OF ELBOW, UNSPECIFIED LATERALITY: Primary | ICD-10-CM

## 2023-02-22 DIAGNOSIS — E11.9 TYPE 2 DIABETES MELLITUS WITHOUT COMPLICATION, WITHOUT LONG-TERM CURRENT USE OF INSULIN (HCC): ICD-10-CM

## 2023-02-22 PROCEDURE — 3078F DIAST BP <80 MM HG: CPT | Performed by: FAMILY MEDICINE

## 2023-02-22 PROCEDURE — 3074F SYST BP LT 130 MM HG: CPT | Performed by: FAMILY MEDICINE

## 2023-02-22 PROCEDURE — 99213 OFFICE O/P EST LOW 20 MIN: CPT | Performed by: FAMILY MEDICINE

## 2023-02-27 ENCOUNTER — PATIENT MESSAGE (OUTPATIENT)
Dept: FAMILY MEDICINE CLINIC | Facility: CLINIC | Age: 63
End: 2023-02-27

## 2023-02-28 NOTE — TELEPHONE ENCOUNTER
From: Benny Muñoz  To: Caty Herzog MD  Sent: 2/27/2023 9:15 PM CST  Subject: Covid    II tested positive for Covid on Monday evening. I think I have been sick since Saturday. Intense headaches, cough, low fever, upset stomach and loose stool. I was wondering if there is anything I should do other than rest. I feel pretty horrible.

## 2023-03-09 ENCOUNTER — PATIENT MESSAGE (OUTPATIENT)
Dept: FAMILY MEDICINE CLINIC | Facility: CLINIC | Age: 63
End: 2023-03-09

## 2023-03-09 NOTE — TELEPHONE ENCOUNTER
From: Shara Muñoz  To: Delonte Buchanan MD  Sent: 3/9/2023 10:45 AM CST  Subject: \"covid fog\"    Not sure if I have covid fog? I still feel in a daze, have a hard time focusing . Is there something that I can do for this or just ride it out?  I really don't feel like myself and so emotional. Thanks

## 2023-03-10 ENCOUNTER — OFFICE VISIT (OUTPATIENT)
Facility: CLINIC | Age: 63
End: 2023-03-10
Payer: COMMERCIAL

## 2023-03-10 VITALS
WEIGHT: 240 LBS | BODY MASS INDEX: 40 KG/M2 | HEART RATE: 71 BPM | DIASTOLIC BLOOD PRESSURE: 70 MMHG | OXYGEN SATURATION: 98 % | SYSTOLIC BLOOD PRESSURE: 142 MMHG

## 2023-03-10 DIAGNOSIS — E11.65 UNCONTROLLED TYPE 2 DIABETES MELLITUS WITH HYPERGLYCEMIA (HCC): Primary | ICD-10-CM

## 2023-03-10 LAB
CARTRIDGE LOT#: ABNORMAL NUMERIC
GLUCOSE BLOOD: 187
HEMOGLOBIN A1C: 7.7 % (ref 4.3–5.6)
TEST STRIP LOT #: NORMAL NUMERIC

## 2023-03-10 PROCEDURE — 3078F DIAST BP <80 MM HG: CPT

## 2023-03-10 PROCEDURE — 3051F HG A1C>EQUAL 7.0%<8.0%: CPT

## 2023-03-10 PROCEDURE — 3077F SYST BP >= 140 MM HG: CPT

## 2023-03-10 PROCEDURE — 83036 HEMOGLOBIN GLYCOSYLATED A1C: CPT

## 2023-03-10 PROCEDURE — 99215 OFFICE O/P EST HI 40 MIN: CPT

## 2023-03-10 PROCEDURE — 82947 ASSAY GLUCOSE BLOOD QUANT: CPT

## 2023-03-15 ENCOUNTER — TELEPHONE (OUTPATIENT)
Facility: CLINIC | Age: 63
End: 2023-03-15

## 2023-03-15 NOTE — TELEPHONE ENCOUNTER
Pt called clinic with a question from previous visit with Marti on 3/10.  is unsure if she was told to discontinue taking Glimepiride or if she was supposed to continue.  did not see the medication listed on her med list but still has refills on medication. Per Marti's visit note, \"Glimepiride-stopped to start other meds\". Let pt know that I would verify with Marti to make sure of this and would call her back later today.

## 2023-03-15 NOTE — TELEPHONE ENCOUNTER
Patient sent additional MyChart. States she has in fact been taking Glimepiride and now looking for guidance. Routed to [facility].

## 2023-03-15 NOTE — TELEPHONE ENCOUNTER
When I saw pt she had told me she stopped glimepiride a while ago- yes did not order it    Please call and remind her of my instructions and please Preggerst message as well-  - Continue metformin 1000mg twice daily  - START Ozempic 0.25mg once weekly for four weeks- pick same day each week- previously she had nausea with ozempic and stopped, but after discussion of med and SE/dietary changes- eat lower fat diet, more small frequent meals, she was interested in re-trialing Ozempic  - STOP  jardiance 25mg (due to recurrent genital yeast infections)  - STOP Januvia once she starts Ozempic

## 2023-03-15 NOTE — TELEPHONE ENCOUNTER
Spoke with patient on telephone and reviewed below. Verbalized understanding of all. States she had no problem picking up Ozempic. Sent a NextVR message with same information. Will close this encounter.

## 2023-03-16 NOTE — TELEPHONE ENCOUNTER
Ok to continue Glimepiride 4mg oral tablet daily how she's been taking it in addition to the Ozempic and Metformin (Make sure she has STARTED Ozempic and is still taking metformin). Please make sure she has STOPPED her Januvia with start of Ozempic- recommend putting it away in a cabinet so she doesn't get the medications mixed up.

## 2023-03-20 ENCOUNTER — PATIENT MESSAGE (OUTPATIENT)
Dept: FAMILY MEDICINE CLINIC | Facility: CLINIC | Age: 63
End: 2023-03-20

## 2023-03-20 NOTE — TELEPHONE ENCOUNTER
From: Ethan Muñoz  To: Jose Dillon MD  Sent: 3/20/2023 8:03 AM CDT  Subject: nerves    I have been having a horrible time the last week, to the point I wanted to quit my job because of the stress. I just realized this morning that I had forgotten to take my citalopram for the last two weeks. Is there anything that I can take until this starts to work again. This is horrible! Thanks. Sorry you have such a needy patient.   grace

## 2023-04-03 ENCOUNTER — PATIENT MESSAGE (OUTPATIENT)
Facility: CLINIC | Age: 63
End: 2023-04-03

## 2023-04-03 RX ORDER — ONDANSETRON 4 MG/1
4 TABLET, ORALLY DISINTEGRATING ORAL EVERY 8 HOURS PRN
Qty: 30 TABLET | Refills: 0 | Status: SHIPPED | OUTPATIENT
Start: 2023-04-03

## 2023-04-03 NOTE — TELEPHONE ENCOUNTER
I am okay with her stopping Ozempic until I see her Friday--    I will also send ondansetron 4mg oral q8 PRN - she can take this for nausea as well which sometimes helps patients get through the nausea with the initial Ozempic start.

## 2023-04-03 NOTE — TELEPHONE ENCOUNTER
Spoke with patient on telephone. Patient states that she had a prescription for Zofran from another provider and she has actually already been using that to try and help these side effects from [address]. States she is not able to tolerate it any longer, this has been about 4 weeks of side effects- and diarrhea/vomiting are affecting her \"quality of life\". She will skip the dose this week and plan on speaking with Marti EARLY at visit on Friday, 4/7.

## 2023-04-07 ENCOUNTER — OFFICE VISIT (OUTPATIENT)
Facility: CLINIC | Age: 63
End: 2023-04-07
Payer: COMMERCIAL

## 2023-04-07 VITALS — DIASTOLIC BLOOD PRESSURE: 88 MMHG | OXYGEN SATURATION: 97 % | SYSTOLIC BLOOD PRESSURE: 142 MMHG | HEART RATE: 66 BPM

## 2023-04-07 DIAGNOSIS — E11.65 TYPE 2 DIABETES MELLITUS WITH HYPERGLYCEMIA, WITHOUT LONG-TERM CURRENT USE OF INSULIN (HCC): Primary | ICD-10-CM

## 2023-04-07 PROCEDURE — 3079F DIAST BP 80-89 MM HG: CPT

## 2023-04-07 PROCEDURE — 99215 OFFICE O/P EST HI 40 MIN: CPT

## 2023-04-07 PROCEDURE — 3077F SYST BP >= 140 MM HG: CPT

## 2023-04-07 RX ORDER — GLIMEPIRIDE 4 MG/1
4 TABLET ORAL
COMMUNITY
End: 2023-04-07

## 2023-04-07 RX ORDER — GLIMEPIRIDE 4 MG/1
4 TABLET ORAL
Qty: 90 TABLET | Refills: 1 | Status: SHIPPED | OUTPATIENT
Start: 2023-04-07

## 2023-04-07 RX ORDER — METFORMIN HYDROCHLORIDE 500 MG/1
1000 TABLET, EXTENDED RELEASE ORAL 2 TIMES DAILY WITH MEALS
Qty: 360 TABLET | Refills: 1 | Status: SHIPPED | OUTPATIENT
Start: 2023-04-07

## 2023-04-22 RX ORDER — ATORVASTATIN CALCIUM 40 MG/1
TABLET, FILM COATED ORAL
Qty: 90 TABLET | Refills: 0 | Status: SHIPPED | OUTPATIENT
Start: 2023-04-22

## 2023-04-26 RX ORDER — NYSTATIN 100000 U/G
CREAM TOPICAL
Qty: 30 G | Refills: 0 | Status: SHIPPED | OUTPATIENT
Start: 2023-04-26

## 2023-04-29 RX ORDER — OMEPRAZOLE 20 MG/1
CAPSULE, DELAYED RELEASE ORAL
Qty: 90 CAPSULE | Refills: 0 | Status: SHIPPED | OUTPATIENT
Start: 2023-04-29

## 2023-04-29 RX ORDER — LEVOTHYROXINE SODIUM 88 UG/1
TABLET ORAL
Qty: 90 TABLET | Refills: 0 | Status: SHIPPED | OUTPATIENT
Start: 2023-04-29

## 2023-05-02 NOTE — TELEPHONE ENCOUNTER
Patient's name and  verified   Future Appointments   Date Time Provider Duong Sales   2023  8:40 AM Angeles Marc MD [facility] Mozella Bitter   2023  8:40 AM Pepito Roblero PA-C [address] EM[facility] [address]   2023  8:00 AM NNEKA Calix [facility] [place]       Patient notified and verbalized an understanding

## 2023-05-18 ENCOUNTER — OFFICE VISIT (OUTPATIENT)
Dept: FAMILY MEDICINE CLINIC | Facility: CLINIC | Age: 63
End: 2023-05-18
Payer: COMMERCIAL

## 2023-05-18 VITALS
WEIGHT: 216 LBS | SYSTOLIC BLOOD PRESSURE: 124 MMHG | HEART RATE: 60 BPM | DIASTOLIC BLOOD PRESSURE: 62 MMHG | OXYGEN SATURATION: 96 % | BODY MASS INDEX: 36 KG/M2 | RESPIRATION RATE: 16 BRPM | TEMPERATURE: 97 F

## 2023-05-18 DIAGNOSIS — K76.0 FATTY LIVER: ICD-10-CM

## 2023-05-18 DIAGNOSIS — Z00.00 WELL ADULT EXAM: Primary | ICD-10-CM

## 2023-05-18 DIAGNOSIS — E11.9 TYPE 2 DIABETES MELLITUS WITHOUT COMPLICATION, WITHOUT LONG-TERM CURRENT USE OF INSULIN (HCC): ICD-10-CM

## 2023-05-18 DIAGNOSIS — I10 HYPERTENSION, UNSPECIFIED TYPE: ICD-10-CM

## 2023-05-18 DIAGNOSIS — D86.9 SARCOID: ICD-10-CM

## 2023-05-18 DIAGNOSIS — E03.9 HYPOTHYROIDISM, UNSPECIFIED TYPE: ICD-10-CM

## 2023-05-18 DIAGNOSIS — F41.9 ANXIETY: ICD-10-CM

## 2023-05-18 DIAGNOSIS — K21.9 GASTROESOPHAGEAL REFLUX DISEASE, UNSPECIFIED WHETHER ESOPHAGITIS PRESENT: ICD-10-CM

## 2023-05-18 DIAGNOSIS — E78.5 HYPERLIPIDEMIA, UNSPECIFIED HYPERLIPIDEMIA TYPE: ICD-10-CM

## 2023-05-18 LAB
ALT SERPL-CCNC: 34 U/L
ANION GAP SERPL CALC-SCNC: 4 MMOL/L (ref 0–18)
AST SERPL-CCNC: 19 U/L (ref 15–37)
BUN BLD-MCNC: 16 MG/DL (ref 7–18)
CALCIUM BLD-MCNC: 9.8 MG/DL (ref 8.5–10.1)
CHLORIDE SERPL-SCNC: 108 MMOL/L (ref 98–112)
CHOLEST SERPL-MCNC: 93 MG/DL (ref ?–200)
CO2 SERPL-SCNC: 26 MMOL/L (ref 21–32)
CREAT BLD-MCNC: 0.72 MG/DL
CREAT UR-SCNC: 209 MG/DL
ERYTHROCYTE [DISTWIDTH] IN BLOOD BY AUTOMATED COUNT: 16 %
EST. AVERAGE GLUCOSE BLD GHB EST-MCNC: 134 MG/DL (ref 68–126)
FASTING PATIENT LIPID ANSWER: YES
FASTING STATUS PATIENT QL REPORTED: YES
GFR SERPLBLD BASED ON 1.73 SQ M-ARVRAT: 95 ML/MIN/1.73M2 (ref 60–?)
GLUCOSE BLD-MCNC: 118 MG/DL (ref 70–99)
HBA1C MFR BLD: 6.3 % (ref ?–5.7)
HCT VFR BLD AUTO: 39.7 %
HDLC SERPL-MCNC: 40 MG/DL (ref 40–59)
HGB BLD-MCNC: 12.7 G/DL
LDLC SERPL CALC-MCNC: 36 MG/DL (ref ?–100)
MCH RBC QN AUTO: 26.7 PG (ref 26–34)
MCHC RBC AUTO-ENTMCNC: 32 G/DL (ref 31–37)
MCV RBC AUTO: 83.6 FL
MICROALBUMIN UR-MCNC: 3.05 MG/DL
MICROALBUMIN/CREAT 24H UR-RTO: 14.6 UG/MG (ref ?–30)
NONHDLC SERPL-MCNC: 53 MG/DL (ref ?–130)
OSMOLALITY SERPL CALC.SUM OF ELEC: 288 MOSM/KG (ref 275–295)
PLATELET # BLD AUTO: 210 10(3)UL (ref 150–450)
POTASSIUM SERPL-SCNC: 4.2 MMOL/L (ref 3.5–5.1)
RBC # BLD AUTO: 4.75 X10(6)UL
SODIUM SERPL-SCNC: 138 MMOL/L (ref 136–145)
T3FREE SERPL-MCNC: 2.1 PG/ML (ref 2.4–4.2)
T4 FREE SERPL-MCNC: 1.4 NG/DL (ref 0.8–1.7)
TRIGL SERPL-MCNC: 86 MG/DL (ref 30–149)
TSI SER-ACNC: 0.13 MIU/ML (ref 0.36–3.74)
VLDLC SERPL CALC-MCNC: 12 MG/DL (ref 0–30)
WBC # BLD AUTO: 5.4 X10(3) UL (ref 4–11)

## 2023-05-18 PROCEDURE — 83036 HEMOGLOBIN GLYCOSYLATED A1C: CPT | Performed by: FAMILY MEDICINE

## 2023-05-18 PROCEDURE — G0438 PPPS, INITIAL VISIT: HCPCS | Performed by: FAMILY MEDICINE

## 2023-05-18 PROCEDURE — 82043 UR ALBUMIN QUANTITATIVE: CPT | Performed by: FAMILY MEDICINE

## 2023-05-18 PROCEDURE — 84450 TRANSFERASE (AST) (SGOT): CPT | Performed by: FAMILY MEDICINE

## 2023-05-18 PROCEDURE — 3044F HG A1C LEVEL LT 7.0%: CPT

## 2023-05-18 PROCEDURE — 84439 ASSAY OF FREE THYROXINE: CPT | Performed by: FAMILY MEDICINE

## 2023-05-18 PROCEDURE — 80048 BASIC METABOLIC PNL TOTAL CA: CPT | Performed by: FAMILY MEDICINE

## 2023-05-18 PROCEDURE — 3074F SYST BP LT 130 MM HG: CPT | Performed by: FAMILY MEDICINE

## 2023-05-18 PROCEDURE — 84460 ALANINE AMINO (ALT) (SGPT): CPT | Performed by: FAMILY MEDICINE

## 2023-05-18 PROCEDURE — 82570 ASSAY OF URINE CREATININE: CPT | Performed by: FAMILY MEDICINE

## 2023-05-18 PROCEDURE — 84481 FREE ASSAY (FT-3): CPT | Performed by: FAMILY MEDICINE

## 2023-05-18 PROCEDURE — 99396 PREV VISIT EST AGE 40-64: CPT | Performed by: FAMILY MEDICINE

## 2023-05-18 PROCEDURE — 3078F DIAST BP <80 MM HG: CPT | Performed by: FAMILY MEDICINE

## 2023-05-18 PROCEDURE — 84443 ASSAY THYROID STIM HORMONE: CPT | Performed by: FAMILY MEDICINE

## 2023-05-18 PROCEDURE — 3061F NEG MICROALBUMINURIA REV: CPT

## 2023-05-18 PROCEDURE — 80061 LIPID PANEL: CPT | Performed by: FAMILY MEDICINE

## 2023-05-18 PROCEDURE — 85027 COMPLETE CBC AUTOMATED: CPT | Performed by: FAMILY MEDICINE

## 2023-05-20 RX ORDER — CITALOPRAM 20 MG/1
20 TABLET ORAL DAILY
Qty: 90 TABLET | Refills: 0 | OUTPATIENT
Start: 2023-05-20

## 2023-06-09 ENCOUNTER — OFFICE VISIT (OUTPATIENT)
Facility: CLINIC | Age: 63
End: 2023-06-09
Payer: COMMERCIAL

## 2023-06-09 VITALS
OXYGEN SATURATION: 98 % | WEIGHT: 208.63 LBS | BODY MASS INDEX: 35 KG/M2 | DIASTOLIC BLOOD PRESSURE: 68 MMHG | HEART RATE: 53 BPM | SYSTOLIC BLOOD PRESSURE: 124 MMHG

## 2023-06-09 DIAGNOSIS — E11.9 TYPE 2 DIABETES MELLITUS WITHOUT COMPLICATION, WITHOUT LONG-TERM CURRENT USE OF INSULIN (HCC): Primary | ICD-10-CM

## 2023-06-09 PROCEDURE — 3078F DIAST BP <80 MM HG: CPT

## 2023-06-09 PROCEDURE — 99215 OFFICE O/P EST HI 40 MIN: CPT

## 2023-06-09 PROCEDURE — 3074F SYST BP LT 130 MM HG: CPT

## 2023-06-29 ENCOUNTER — PATIENT MESSAGE (OUTPATIENT)
Dept: FAMILY MEDICINE CLINIC | Facility: CLINIC | Age: 63
End: 2023-06-29

## 2023-06-29 RX ORDER — PREDNISONE 20 MG/1
TABLET ORAL
Qty: 18 TABLET | Refills: 0 | Status: SHIPPED | OUTPATIENT
Start: 2023-06-29 | End: 2023-07-08

## 2023-07-14 RX ORDER — ATORVASTATIN CALCIUM 40 MG/1
40 TABLET, FILM COATED ORAL NIGHTLY
Qty: 90 TABLET | Refills: 3 | Status: SHIPPED | OUTPATIENT
Start: 2023-07-14

## 2023-07-25 RX ORDER — OMEPRAZOLE 20 MG/1
CAPSULE, DELAYED RELEASE ORAL
Qty: 90 CAPSULE | Refills: 3 | Status: SHIPPED | OUTPATIENT
Start: 2023-07-25

## 2023-07-25 RX ORDER — LEVOTHYROXINE SODIUM 88 UG/1
88 TABLET ORAL DAILY
Qty: 90 TABLET | Refills: 3 | Status: SHIPPED | OUTPATIENT
Start: 2023-07-25

## 2023-08-01 RX ORDER — NYSTATIN 100000 U/G
CREAM TOPICAL
Qty: 30 G | Refills: 2 | Status: SHIPPED | OUTPATIENT
Start: 2023-08-01

## 2023-08-08 ENCOUNTER — OFFICE VISIT (OUTPATIENT)
Dept: FAMILY MEDICINE CLINIC | Facility: CLINIC | Age: 63
End: 2023-08-08
Payer: COMMERCIAL

## 2023-08-08 VITALS
HEIGHT: 65 IN | DIASTOLIC BLOOD PRESSURE: 70 MMHG | SYSTOLIC BLOOD PRESSURE: 128 MMHG | WEIGHT: 193.19 LBS | OXYGEN SATURATION: 96 % | HEART RATE: 65 BPM | TEMPERATURE: 98 F | BODY MASS INDEX: 32.19 KG/M2

## 2023-08-08 DIAGNOSIS — R22.2 LUMP IN CHEST: Primary | ICD-10-CM

## 2023-08-08 PROCEDURE — 3008F BODY MASS INDEX DOCD: CPT | Performed by: FAMILY MEDICINE

## 2023-08-08 PROCEDURE — 3074F SYST BP LT 130 MM HG: CPT | Performed by: FAMILY MEDICINE

## 2023-08-08 PROCEDURE — 3078F DIAST BP <80 MM HG: CPT | Performed by: FAMILY MEDICINE

## 2023-08-08 PROCEDURE — 99213 OFFICE O/P EST LOW 20 MIN: CPT | Performed by: FAMILY MEDICINE

## 2023-08-08 RX ORDER — LOSARTAN POTASSIUM 25 MG/1
25 TABLET ORAL DAILY
Qty: 90 TABLET | Refills: 3 | Status: SHIPPED | OUTPATIENT
Start: 2023-08-08

## 2023-08-08 RX ORDER — CITALOPRAM 40 MG/1
40 TABLET ORAL DAILY
Qty: 90 TABLET | Refills: 3 | Status: SHIPPED | OUTPATIENT
Start: 2023-08-08

## 2023-08-21 ENCOUNTER — NURSE ONLY (OUTPATIENT)
Dept: FAMILY MEDICINE CLINIC | Facility: CLINIC | Age: 63
End: 2023-08-21
Payer: COMMERCIAL

## 2023-08-21 DIAGNOSIS — E78.5 HYPERLIPIDEMIA, UNSPECIFIED HYPERLIPIDEMIA TYPE: ICD-10-CM

## 2023-08-21 LAB
CHOLEST SERPL-MCNC: 106 MG/DL (ref ?–200)
FASTING PATIENT LIPID ANSWER: YES
HDLC SERPL-MCNC: 40 MG/DL (ref 40–59)
LDLC SERPL CALC-MCNC: 48 MG/DL (ref ?–100)
NONHDLC SERPL-MCNC: 66 MG/DL (ref ?–130)
TRIGL SERPL-MCNC: 94 MG/DL (ref 30–149)
VLDLC SERPL CALC-MCNC: 13 MG/DL (ref 0–30)

## 2023-08-21 PROCEDURE — 80061 LIPID PANEL: CPT | Performed by: FAMILY MEDICINE

## 2023-08-21 NOTE — PROGRESS NOTES
Cesar Muñoz present in office for nurse visit. Labs as ordered by Dr. Bladimir Ashley; 22 g, Left AC, and green tube     All questions/concerns addressed. Patient left in stable condition.

## 2023-09-05 ENCOUNTER — OFFICE VISIT (OUTPATIENT)
Dept: OBGYN CLINIC | Facility: CLINIC | Age: 63
End: 2023-09-05
Payer: COMMERCIAL

## 2023-09-05 VITALS
HEART RATE: 59 BPM | SYSTOLIC BLOOD PRESSURE: 126 MMHG | DIASTOLIC BLOOD PRESSURE: 74 MMHG | WEIGHT: 187.38 LBS | BODY MASS INDEX: 31.22 KG/M2 | HEIGHT: 65 IN

## 2023-09-05 DIAGNOSIS — L28.0 LICHEN OF SKIN: ICD-10-CM

## 2023-09-05 DIAGNOSIS — N76.0 VAGINITIS AND VULVOVAGINITIS: Primary | ICD-10-CM

## 2023-09-05 PROCEDURE — 3008F BODY MASS INDEX DOCD: CPT | Performed by: NURSE PRACTITIONER

## 2023-09-05 PROCEDURE — 87480 CANDIDA DNA DIR PROBE: CPT | Performed by: NURSE PRACTITIONER

## 2023-09-05 PROCEDURE — 87510 GARDNER VAG DNA DIR PROBE: CPT | Performed by: NURSE PRACTITIONER

## 2023-09-05 PROCEDURE — 87660 TRICHOMONAS VAGIN DIR PROBE: CPT | Performed by: NURSE PRACTITIONER

## 2023-09-05 PROCEDURE — 99213 OFFICE O/P EST LOW 20 MIN: CPT | Performed by: NURSE PRACTITIONER

## 2023-09-05 PROCEDURE — 3074F SYST BP LT 130 MM HG: CPT | Performed by: NURSE PRACTITIONER

## 2023-09-05 PROCEDURE — 3078F DIAST BP <80 MM HG: CPT | Performed by: NURSE PRACTITIONER

## 2023-09-05 RX ORDER — CLOBETASOL PROPIONATE 0.5 MG/G
1 OINTMENT TOPICAL NIGHTLY
Qty: 30 G | Refills: 0 | Status: SHIPPED | OUTPATIENT
Start: 2023-09-05

## 2023-10-03 ENCOUNTER — OFFICE VISIT (OUTPATIENT)
Dept: OBGYN CLINIC | Facility: CLINIC | Age: 63
End: 2023-10-03
Payer: COMMERCIAL

## 2023-10-03 VITALS — SYSTOLIC BLOOD PRESSURE: 124 MMHG | HEART RATE: 62 BPM | DIASTOLIC BLOOD PRESSURE: 76 MMHG

## 2023-10-03 DIAGNOSIS — L81.9 SKIN HYPOPIGMENTATION: ICD-10-CM

## 2023-10-03 DIAGNOSIS — N76.0 VAGINITIS AND VULVOVAGINITIS: Primary | ICD-10-CM

## 2023-10-03 PROCEDURE — 56605 BIOPSY OF VULVA/PERINEUM: CPT | Performed by: NURSE PRACTITIONER

## 2023-10-03 PROCEDURE — 3078F DIAST BP <80 MM HG: CPT | Performed by: NURSE PRACTITIONER

## 2023-10-03 PROCEDURE — 3074F SYST BP LT 130 MM HG: CPT | Performed by: NURSE PRACTITIONER

## 2023-10-03 PROCEDURE — 88305 TISSUE EXAM BY PATHOLOGIST: CPT | Performed by: NURSE PRACTITIONER

## 2023-10-03 PROCEDURE — 88312 SPECIAL STAINS GROUP 1: CPT | Performed by: NURSE PRACTITIONER

## 2023-10-03 PROCEDURE — 56606 BIOPSY OF VULVA/PERINEUM: CPT | Performed by: NURSE PRACTITIONER

## 2023-10-09 DIAGNOSIS — E11.65 TYPE 2 DIABETES MELLITUS WITH HYPERGLYCEMIA, WITHOUT LONG-TERM CURRENT USE OF INSULIN (HCC): ICD-10-CM

## 2023-10-09 NOTE — TELEPHONE ENCOUNTER
LOV: 6/09    RTC: 6 months     FU: 12/08    Last Refill: 7/17    Month Supply Pending: 3 moths.      Last office visit note: - Continue metformin 1000mg BID, Januvia 100mg daily

## 2023-10-11 ENCOUNTER — PATIENT MESSAGE (OUTPATIENT)
Dept: OBGYN CLINIC | Facility: CLINIC | Age: 63
End: 2023-10-11

## 2023-10-13 RX ORDER — CLOTRIMAZOLE AND BETAMETHASONE DIPROPIONATE 10; .64 MG/G; MG/G
CREAM TOPICAL
Qty: 15 G | Refills: 0 | Status: SHIPPED | OUTPATIENT
Start: 2023-10-13

## 2023-10-13 NOTE — TELEPHONE ENCOUNTER
Called patient; no answer. Left a message to call back.
From: Ezio Muñoz  To: Félix Franklin  Sent: 10/11/2023 1:29 PM CDT  [facility]: [facility] [place] CREAME AGAIN TODAY AND WHEN I WENT TOT BATHROOM THERE IS A LARGE [address] SUBSTANCE ON  [email or web address]. PLEASE ADVISE IF I SHOULD STOP USING THIS OR WHAT I SHOULD DO.  I AM BECOMING VERY ITCHY AGAIN
I had recommended she not use the Nystatin as it has not helped in the past so I would have her stop. If she has green mucous she should try to look at the skin to make sure it isn't drainage from her external vulva or rectum/ genitalia where her skin breakdown is. If it is I would recommend she go to the ER to rule out infection. Otherwise I had prescribed a new Rx which is in her result note. If she continues to see the discharge then I would have her come in for an evaluation.
Spoke to patient and states that the \"green gooey mucus like substance\" is the medication that she was applying. Discussed test results and sent in the new topical. All questions answered. Has follow-up appointment already scheduled.
General

## 2023-10-26 ENCOUNTER — TELEPHONE (OUTPATIENT)
Dept: FAMILY MEDICINE CLINIC | Facility: CLINIC | Age: 63
End: 2023-10-26

## 2023-10-26 DIAGNOSIS — E78.5 HYPERLIPIDEMIA, UNSPECIFIED HYPERLIPIDEMIA TYPE: Primary | ICD-10-CM

## 2023-10-26 DIAGNOSIS — E11.9 TYPE 2 DIABETES MELLITUS WITHOUT COMPLICATION, WITHOUT LONG-TERM CURRENT USE OF INSULIN (HCC): ICD-10-CM

## 2023-10-26 NOTE — TELEPHONE ENCOUNTER
Pt is coming in for Lipid panel but was wondering if she should have A1C checked too. If so, please place order so she can get it done when she comes. Thank you!       Future Appointments   Date Time Provider Duong Sales   11/7/2023  9:45 AM NNEKA Back [facility] [place]   11/21/2023  8:45 AM [facility] NURSE [facility] [place]   12/8/2023  8:00 AM Nalini Carlos APN [facility] [place]

## 2023-11-21 ENCOUNTER — NURSE ONLY (OUTPATIENT)
Dept: FAMILY MEDICINE CLINIC | Facility: CLINIC | Age: 63
End: 2023-11-21
Payer: COMMERCIAL

## 2023-11-21 DIAGNOSIS — E11.9 TYPE 2 DIABETES MELLITUS WITHOUT COMPLICATION, WITHOUT LONG-TERM CURRENT USE OF INSULIN (HCC): ICD-10-CM

## 2023-11-21 DIAGNOSIS — E78.5 HYPERLIPIDEMIA, UNSPECIFIED HYPERLIPIDEMIA TYPE: ICD-10-CM

## 2023-11-21 LAB
CHOLEST SERPL-MCNC: 103 MG/DL (ref ?–200)
EST. AVERAGE GLUCOSE BLD GHB EST-MCNC: 128 MG/DL (ref 68–126)
FASTING PATIENT LIPID ANSWER: YES
HBA1C MFR BLD: 6.1 % (ref ?–5.7)
HDLC SERPL-MCNC: 40 MG/DL (ref 40–59)
LDLC SERPL CALC-MCNC: 43 MG/DL (ref ?–100)
NONHDLC SERPL-MCNC: 63 MG/DL (ref ?–130)
TRIGL SERPL-MCNC: 107 MG/DL (ref 30–149)
VLDLC SERPL CALC-MCNC: 15 MG/DL (ref 0–30)

## 2023-11-21 PROCEDURE — 3044F HG A1C LEVEL LT 7.0%: CPT

## 2023-11-21 PROCEDURE — 83036 HEMOGLOBIN GLYCOSYLATED A1C: CPT | Performed by: FAMILY MEDICINE

## 2023-11-21 PROCEDURE — 80061 LIPID PANEL: CPT | Performed by: FAMILY MEDICINE

## 2023-11-21 NOTE — PROGRESS NOTES
[facility] present in office for nurse visit. Labs as ordered by Dr. Herbert Murphy; 24 g, Left AC, lavender tube, and green tube     All questions/concerns addressed. Patient left in stable condition.

## 2023-11-28 ENCOUNTER — PATIENT MESSAGE (OUTPATIENT)
Facility: CLINIC | Age: 63
End: 2023-11-28

## 2023-11-28 ENCOUNTER — PATIENT MESSAGE (OUTPATIENT)
Dept: FAMILY MEDICINE CLINIC | Facility: CLINIC | Age: 63
End: 2023-11-28

## 2023-11-28 NOTE — TELEPHONE ENCOUNTER
From: Jeff Muñoz  To: Karol Fraser  Sent: 11/28/2023 1:03 PM CST  Subject: sarcodosis    I am having a hard time breathing right now and it is in my back just like when i get the flare ups. Should I start taking the steroids again.  No fever or an other symptoms? just let me know what the next step is. thanks

## 2023-11-28 NOTE — TELEPHONE ENCOUNTER
Component      Latest Ref Rng 11/21/2023   HEMOGLOBIN A1c      <5.7 % 6.1 (H)    ESTIMATED AVERAGE GLUCOSE      68 - 126 mg/dL 128 (H)       Legend:  (H) High    See above labs under Dr. Cindy Wilks. Per Dr. Cindy Wilks result notes: \"I would recommend stopping Januvia now and repeating the A1C in 3 months. She can discuss this with her ENDO. \".    Routing on to [address] for review. Patient has f/u visit on 12/8.

## 2023-11-28 NOTE — TELEPHONE ENCOUNTER
From: Yunier Muñoz  To: Verónica Alexander  Sent: 11/28/2023 1:07 PM CST  Subject: jardiance    I had A1C labs run on the 21st and Dr Harrison Liang mentioned to me about stopping the Jardiance, but to check with your first. I see you on the 8th of december but was wondering what your thoughts were.  thanks

## 2023-12-04 DIAGNOSIS — E11.65 TYPE 2 DIABETES MELLITUS WITH HYPERGLYCEMIA, WITHOUT LONG-TERM CURRENT USE OF INSULIN (HCC): ICD-10-CM

## 2023-12-04 RX ORDER — GLIMEPIRIDE 4 MG/1
4 TABLET ORAL
Qty: 90 TABLET | Refills: 0 | OUTPATIENT
Start: 2023-12-04

## 2023-12-04 RX ORDER — METFORMIN HYDROCHLORIDE 500 MG/1
1000 TABLET, EXTENDED RELEASE ORAL 2 TIMES DAILY WITH MEALS
Qty: 360 TABLET | Refills: 0 | Status: SHIPPED | OUTPATIENT
Start: 2023-12-04

## 2023-12-04 NOTE — TELEPHONE ENCOUNTER
Refill sent for metformin- glimepiride was stopped LOV, has visit 12/8, will discuss. Will close encounter.

## 2023-12-04 NOTE — TELEPHONE ENCOUNTER
LOV:6/09    RTC: 6 months    FU:12/08    Last Refill:    Month Supply Pending:    Office visit note 6/9- Continue metformin 1000mg BID, Januvia 100mg daily  - STOP glimepiride 4mg daily:      Refill for Glimepiride denied as inappropriate    Refill for Metformin pended and routed for review. Keri Montalvo

## 2023-12-08 ENCOUNTER — LAB ENCOUNTER (OUTPATIENT)
Dept: LAB | Age: 63
End: 2023-12-08
Payer: COMMERCIAL

## 2023-12-08 ENCOUNTER — OFFICE VISIT (OUTPATIENT)
Facility: CLINIC | Age: 63
End: 2023-12-08
Payer: COMMERCIAL

## 2023-12-08 VITALS
HEART RATE: 67 BPM | OXYGEN SATURATION: 99 % | DIASTOLIC BLOOD PRESSURE: 68 MMHG | SYSTOLIC BLOOD PRESSURE: 108 MMHG | RESPIRATION RATE: 14 BRPM

## 2023-12-08 DIAGNOSIS — E11.9 TYPE 2 DIABETES MELLITUS WITHOUT COMPLICATION, WITHOUT LONG-TERM CURRENT USE OF INSULIN (HCC): Primary | ICD-10-CM

## 2023-12-08 DIAGNOSIS — E03.8 HYPOTHYROIDISM DUE TO HASHIMOTO'S THYROIDITIS: ICD-10-CM

## 2023-12-08 DIAGNOSIS — R53.82 CHRONIC FATIGUE: ICD-10-CM

## 2023-12-08 DIAGNOSIS — E06.3 HYPOTHYROIDISM DUE TO HASHIMOTO'S THYROIDITIS: ICD-10-CM

## 2023-12-08 DIAGNOSIS — E11.9 TYPE 2 DIABETES MELLITUS WITHOUT COMPLICATION, WITHOUT LONG-TERM CURRENT USE OF INSULIN (HCC): ICD-10-CM

## 2023-12-08 LAB
EST. AVERAGE GLUCOSE BLD GHB EST-MCNC: 134 MG/DL (ref 68–126)
HBA1C MFR BLD: 6.3 % (ref ?–5.7)
T4 FREE SERPL-MCNC: 1.2 NG/DL (ref 0.8–1.7)
TSI SER-ACNC: 0.57 MIU/ML (ref 0.36–3.74)
VIT B12 SERPL-MCNC: 288 PG/ML (ref 193–986)
VIT D+METAB SERPL-MCNC: 39.6 NG/ML (ref 30–100)

## 2023-12-08 PROCEDURE — 3074F SYST BP LT 130 MM HG: CPT

## 2023-12-08 PROCEDURE — 99214 OFFICE O/P EST MOD 30 MIN: CPT

## 2023-12-08 PROCEDURE — 83036 HEMOGLOBIN GLYCOSYLATED A1C: CPT

## 2023-12-08 PROCEDURE — 36415 COLL VENOUS BLD VENIPUNCTURE: CPT

## 2023-12-08 PROCEDURE — 82607 VITAMIN B-12: CPT

## 2023-12-08 PROCEDURE — 84443 ASSAY THYROID STIM HORMONE: CPT

## 2023-12-08 PROCEDURE — 3078F DIAST BP <80 MM HG: CPT

## 2023-12-08 PROCEDURE — 84439 ASSAY OF FREE THYROXINE: CPT

## 2023-12-08 PROCEDURE — 82306 VITAMIN D 25 HYDROXY: CPT

## 2023-12-08 RX ORDER — PERPHENAZINE 16 MG/1
TABLET, FILM COATED ORAL
Qty: 200 STRIP | Refills: 3 | Status: SHIPPED | OUTPATIENT
Start: 2023-12-08

## 2023-12-08 NOTE — PROGRESS NOTES
[facility] Note    Name: Brain Mcnair  Date: 2023      HISTORY OF PRESENT ILLNESS   Brain Mcnair is a 59year old female with PMHx significant for type 2 diabetes melltius, NAFLD, HTN, HLD, hypothyroidism who presents for type 2 diabetes mellitus management. Initial HPI consult in 2023:  A1C 7.7% at today's OV, random non fasting . Prev pt at [facility], insurance changed  Diagnosed age: 49  Pt denies hx of hospitalization for DM and/or pancreatitis   Family history of DM: no fam hx  Of note, uses nystatin cream daily and blistering in vaginal area d/t chronic yeast infection she's had for months. Is taking Jardiance. Previously trialed Ozempic/Trulicity and stopped because she didn't like that she was nauseated but notes that she was not instructed of how to change diet to better tolerate GLPs (smaller or low fat meals). Goal is weight loss- has meeting w/ [employer] in     Interim hx:   2023- Started Ozempic, did not tolerate again and having GI s/e - nausea, loss of appetite, vomiting, and diarrhea. Last Ozempic injection was ~9 days ago, Wednesday. Notes she is taking glimepiride 4mg daily (last OV thought she was off)    2023- A1C 6.3%. Taking metformin 1000mg BID, glimepiride 4mg daily (stopped d/t BG in the low 80s in the AM by PCP), januvia 100mg daily  Blood Glucose log/CGM: FBG: improving   Hx of hypothyroidism- - On LT4 88mcg, managed by PCP    Dec 2023-  Last A1c value was 6.1% done 2023. She has been doing keto diet for the last 8 months; has lost nearly 80 lbs (still eating fruit); feels great  Current DM meds: metformin 1G BID, januvia 100mg daily  Checking 1x/day: FBs-100s  Hx of hypothyroidism- Still with some ongoing fatigue. Last TFTs May 2023.  On LT4 88mcg    Previously trialed/failed: Acarbose 65GF TID,  Trulicity - intolerant due to N/V, glimepiride- stopped to start other meds, jardiance 25mg- stopped d/t recurrent yeast infections, ozempic - bad GI side effects    REVIEW OF SYSTEMS  Ten point review of systems has been performed and is otherwise negative and/or non-contributory, except as described above. Medications:     Current Outpatient Medications:     Glucose Blood (CONTOUR NEXT TEST) In Vitro Strip, Use twice daily to check blood sugar, Disp: 200 strip, Rfl: 3    METFORMIN  MG Oral Tablet 24 Hr, Take 2 tablets (1,000 mg total) by mouth 2 (two) times daily with meals. , Disp: 360 tablet, Rfl: 0    atorvastatin 40 MG Oral Tab, Take 0.5 tablets (20 mg total) by mouth nightly., Disp: , Rfl:     clotrimazole-betamethasone 1-0.05 % External Cream, Apply 1 Application topically to affected area at bedtime. Limit use to just as needed and do not use more than twice weekly. , Disp: 15 g, Rfl: 0    clobetasol 0.05 % External Ointment, Apply 1 Application topically at bedtime. Apply a thin layer to affected area at HS nightly for 12 weeks, Disp: 30 g, Rfl: 0    losartan 25 MG Oral Tab, Take 1 tablet (25 mg total) by mouth daily. , Disp: 90 tablet, Rfl: 3    citalopram 40 MG Oral Tab, Take 1 tablet (40 mg total) by mouth daily. , Disp: 90 tablet, Rfl: 3    nystatin 100,000 Units/g External Cream, Apply TOPICALLY to affected area twice per day, Disp: 30 g, Rfl: 2    OMEPRAZOLE 20 MG Oral Capsule Delayed Release, TAKE ONE CAPSULE BY MOUTH DAILY BEFORE BREAKFAST, Disp: 90 capsule, Rfl: 3    LEVOTHYROXINE 88 MCG Oral Tab, TAKE ONE TABLET BY MOUTH ONE TIME DAILY, Disp: 90 tablet, Rfl: 3    cetirizine 10 MG Oral Tab, Take 1 tablet (10 mg total) by mouth daily. , Disp:  , Rfl:     Magnesium-Potassium 250-100 MG Oral Tab, Take 1 tablet by mouth daily. , Disp: , Rfl:      Allergies:    Allergies   Allergen Reactions    Amoxicillin-Pot Clavulanate NAUSEA ONLY and RASH    Nitroglycerin HYPOTENSION and OTHER (SEE COMMENTS)     \"reacts too fast\" - per pt  Syncope in ER      Tramadol RASH and NAUSEA ONLY     Also double vision Ozempic (0.25 Or 0.5 Mg-Dose) [Semaglutide] NAUSEA AND VOMITING    Diazepam OTHER (SEE COMMENTS)     Prolonged effect to diazepam 10 (per pt)  Loss of memory, severe sedation         Social History:   Social History     Socioeconomic History    Marital status: Single    Number of children: 1   Occupational History    Occupation: [employer]   Tobacco Use    Smoking status: Former     Packs/day: .5     Types: Cigarettes     Start date:      Quit date:      Years since quittin.9    Smokeless tobacco: Never   Vaping Use    Vaping Use: Never used   Substance and Sexual Activity    Alcohol use: Not Currently    Drug use: Never    Sexual activity: Not Currently     Partners: Male     Birth control/protection: Hysterectomy   Other Topics Concern    Caffeine Concern No    Exercise Yes     Comment: 3x week 1 hr    Seat Belt Yes       Medical History:   Past Medical History:   Diagnosis Date    Allergic rhinitis     COVID 2020    S/S: cough, cold, aches, pneumonia - no hospitalization     Diverticulosis 2020    Seen on screening colonoscopy    DM2 (diabetes mellitus, type 2) ([facility]     Fatty liver     Incidental finding on CT chest 2020 at [facility]    GERD (gastroesophageal reflux disease)     History of blood transfusion     during      Hyperlipidemia     Hypertension     Hypothyroid     Osteoarthritis     UPPER BACK     Sarcoid     Visual impairment     GLASSES       Surgical history:   Past Surgical History:   Procedure Laterality Date     DELIVERY ONLY      CHOLECYSTECTOMY      COLONOSCOPY  2020    Dr. Mina Sutton [place] [address] SURGERY  2022    ventral hernia repair    HYSTERECTOMY      Due to DUB    THORACOTOMY,LTD,BIOPSY  2015    To diagnose Sarcoid     PHYSICAL EXAM  Vitals:    23 0757   BP: 108/68   BP Location: Right arm   Patient Position: Sitting   Cuff Size: adult   Pulse: 67   Resp: 14   SpO2: 99%        General Appearance:  alert, well developed, in no acute distress  Eyes:  normal conjunctivae, sclera  Musculoskeletal:  normal muscle strength and tone  Skin:  normal moisture and skin texture  Hair & Nails:  normal scalp hair     Neuro:  sensory grossly intact and motor grossly intact  Psychiatric:  oriented to time, self, and place    Wt Readings from Last 6 Encounters:   09/05/23 187 lb 6.4 oz (85 kg)   08/08/23 193 lb 3.2 oz (87.6 kg)   06/09/23 208 lb 9.6 oz (94.6 kg)   05/18/23 216 lb (98 kg)   03/10/23 240 lb (108.9 kg)   02/22/23 242 lb 8 oz (110 kg)         ASSESSMENT/PLAN:    (E11.9) Type 2 diabetes mellitus without complication, without long-term current use of insulin (HCC)  (primary encounter diagnosis)  Plan:   A1C 6.1%, to goal <7%. A1C continues to improve w/ patient's weight loss efforts and low carb diet. Given A1C to goal I am amenable to pt discontinuing her [place] and [place] with plan to closely monitor BG, if they worsen she will need to resume. - trial STOP januvia 100mg daily; monitor FBG (goal <130) and 2hPP (goal <140-150), if going above goal need to restart  - Continue metformin 1000mg BID  - Ophtho: no DR, Last Dilated Eye Exam: 03/24/23   Eye Exam shows Diabetic Retinopathy?: No  - BP controlled, on losartan  - LDL at goal, on statin    - MAB negative May 2023 on ARB  - Neuropathy/ Foot exam: Last Foot Exam: 03/10/23  - CAD: none       (E03.8,  E06.3) Hypothyroidism due to Hashimoto's thyroiditis  Plan: Hx of Hashimoto's on LT4 88mcg. Feeling more fatigued today. - repeat TFTs  - refill pending results  - instructed to hold biotin 3 days before testing, she does not use any biotin containing products    (R53.82) Chronic fatigue  Plan:   Reviewed that fatigue is often multifactorial and we have reviewed the common etiologies, both endocrine and non-endocrine.   She also reports some current ongoing life stressors that may be contributing to fatigue/poor sleep.  - repeat TFTs  - no anemia per CBC  - vit D - previously insufficient, repeat,  - B12 - ordered given chronic metformin use  - pt denies snoring/KEE  - reviewed if all above normal, pt will continue to follow up with PCP       Return in about 6 months (around 6/8/2024) for DM follow up.     12/8/2023  NNEKA Barlow

## 2023-12-29 ENCOUNTER — PATIENT MESSAGE (OUTPATIENT)
Dept: FAMILY MEDICINE CLINIC | Facility: CLINIC | Age: 63
End: 2023-12-29

## 2023-12-29 DIAGNOSIS — Z12.31 BREAST CANCER SCREENING BY MAMMOGRAM: Primary | ICD-10-CM

## 2023-12-29 NOTE — TELEPHONE ENCOUNTER
From: Emil Muñoz  To: Lorrie Brown  Sent: 12/29/2023 2:11 PM CST  Subject: MAMMOGRAM    I JUST RECIEVED A LETTER THAT I AM DUE FOR A MAMMOGRAM. NOT SURE IF I NEED A REFERRAL OR NOT.  [address] [facility]!!

## 2024-02-08 NOTE — PROGRESS NOTES
[facility]    Medicine Progress Note - Hospitalist Service    Date of Admission:  2/7/2024    Assessment & Plan   Cristiane Birmingham is a 82 year old old female with a history of GERD and hypothyroidism who presented for an elective left hip conversion bipolar 2 total hip arthroplasty.  Doing well postoperative day #1.  Medically ready to discharge if cleared by orthopedics and therapy.     Left hip conversion bipolar total  Routine postoperative cares  PT and OT evaluation  Pain control  VTE prophylaxis per primary team     Hypothyroidism     GERD  Will schedule PPI daily while here in the hospital  Back to prehospital regimen at discharge     Hyperlipidemia          Diet: Advance Diet as Tolerated: Regular Diet Adult  Discharge Instruction - Regular Diet Adult    DVT Prophylaxis: Defer to primary service  Yuen Catheter: Not present  Lines: None     Cardiac Monitoring: None  Code Status: Full Code      Clinically Significant Risk Factors                  # Hypertension: Noted on problem list                   Disposition Plan     Expected Discharge Date: 02/08/2024                    Bernardo Abernathy MD  Hospitalist Service  [facility]  Securely message with [employer] (more info)  Text page via [employer] Paging/Directory   ______________________________________________________________________    Interval History   Patient is doing well.  Working with therapy.  Has urinated without issues.  Eating without issues.  Passing flatus but no BM yet.  Discussed bowel regimen for at home.    Physical Exam   Vital Signs: Temp: 97.4  F (36.3  C) Temp src: Oral BP: (!) 160/69 Pulse: 89   Resp: 16 SpO2: 95 % O2 Device: None (Room air) Oxygen Delivery: 10 LPM  Weight: 148 lbs 0 oz    General Appearance: No apparent distress  Respiratory: Clear to auscultation bilaterally  Cardiovascular: Regular  GI: Abdomen is soft and nontender  Skin: Visualized skin is normal  Other: Eye contact  S: Procedure:  Vulvar  biopsy    All risks and benefits were discussed with the patient. She was consented to proceed with the vulvar biopsies. O:   She was positioned in stirups and the diffuse hypopigmentation to her vulvar was visualized. One on each the right labia majora and the left labia minora respectively was cleaned with betadine and anesthesized with 1% lidocaine. The 3mm punch biopsy was then cut and removed with sterile forceps from each area. Any bleeding was controlled with sliver nitrate. Sterile dressing was applied. Patient tolerated the procedure well. A/P:  1. Vaginitis and vulvovaginitis  - Specimen to pathology , tissue; Future    2. Skin hypopigmentation  - Specimen to pathology , tissue;  Future    Advised she call with any abnormal pain, bleeding or fever    Discussed marilyn- care    RTC 4 weeks with normal affect        38 MINUTES SPENT BY ME on the date of service doing chart review, history, exam, documentation & further activities per the note.      Data     I have personally reviewed the following data over the past 24 hrs:    N/A  \   12.7   / N/A     N/A N/A N/A /  110 (H)   N/A N/A N/A \       Imaging results reviewed over the past 24 hrs:   Recent Results (from the past 24 hour(s))   XR Pelvis w Hip Port Left  1 View    Narrative    EXAM: XR PELVIS AND HIP PORTABLE LEFT 1 VIEW  LOCATION: [facility]  DATE: 2/7/2024    INDICATION: Status post hip surgery.  COMPARISON: 03/12/2023.      Impression    IMPRESSION: Postop changes left total hip revision. No dislocation. Bones are demineralized. The medial acetabular wall is not well seen which may be related to technique, chronic bony thinning/dehiscence or an age-indeterminate fracture. Correlation   with the patient's surgical history is recommended and continued follow-up with attention to this area is advised.

## 2024-02-13 ENCOUNTER — HOSPITAL ENCOUNTER (OUTPATIENT)
Dept: MAMMOGRAPHY | Age: 64
Discharge: HOME | End: 2024-02-13
Attending: FAMILY MEDICINE
Payer: COMMERCIAL

## 2024-02-13 DIAGNOSIS — Z12.31 BREAST CANCER SCREENING BY MAMMOGRAM: ICD-10-CM

## 2024-02-13 PROCEDURE — 77063 BREAST TOMOSYNTHESIS BI: CPT | Performed by: FAMILY MEDICINE

## 2024-02-13 PROCEDURE — 77067 SCR MAMMO BI INCL CAD: CPT | Performed by: FAMILY MEDICINE

## 2024-03-02 DIAGNOSIS — E11.65 TYPE 2 DIABETES MELLITUS WITH HYPERGLYCEMIA, WITHOUT LONG-TERM CURRENT USE OF INSULIN (HCC): ICD-10-CM

## 2024-03-04 RX ORDER — METFORMIN HYDROCHLORIDE 500 MG/1
1000 TABLET, EXTENDED RELEASE ORAL 2 TIMES DAILY WITH MEALS
Qty: 360 TABLET | Refills: 0 | Status: SHIPPED | OUTPATIENT
Start: 2024-03-04

## 2024-03-04 NOTE — TELEPHONE ENCOUNTER
LOV:     RTC:    FU:614    Last Refill:     Month Supply Pendin days with 2 refills    Last office visit note: - Continue metformin 1000mg BID     Refill pended and routed for review.

## 2024-04-02 DIAGNOSIS — E11.65 TYPE 2 DIABETES MELLITUS WITH HYPERGLYCEMIA, WITHOUT LONG-TERM CURRENT USE OF INSULIN (HCC): ICD-10-CM

## 2024-04-02 RX ORDER — SITAGLIPTIN 100 MG/1
100 TABLET, FILM COATED ORAL DAILY
Qty: 90 TABLET | Refills: 0 | OUTPATIENT
Start: 2024-04-02

## 2024-04-24 ENCOUNTER — HOSPITAL ENCOUNTER (OUTPATIENT)
Age: 64
Discharge: HOME | End: 2024-04-24
Payer: COMMERCIAL

## 2024-04-24 VITALS
OXYGEN SATURATION: 98 % | RESPIRATION RATE: 16 BRPM | TEMPERATURE: 99 F | SYSTOLIC BLOOD PRESSURE: 138 MMHG | DIASTOLIC BLOOD PRESSURE: 65 MMHG | HEART RATE: 58 BPM

## 2024-04-24 DIAGNOSIS — B00.1 COLD SORE: ICD-10-CM

## 2024-04-24 DIAGNOSIS — J02.9 SORE THROAT: Primary | ICD-10-CM

## 2024-04-24 LAB
POCT INFLUENZA A: NEGATIVE
POCT INFLUENZA B: NEGATIVE
S PYO AG THROAT QL: NEGATIVE
SARS-COV-2 RNA RESP QL NAA+PROBE: NOT DETECTED

## 2024-04-24 PROCEDURE — 99203 OFFICE O/P NEW LOW 30 MIN: CPT | Performed by: NURSE PRACTITIONER

## 2024-04-24 PROCEDURE — 87880 STREP A ASSAY W/OPTIC: CPT | Performed by: NURSE PRACTITIONER

## 2024-04-24 PROCEDURE — U0002 COVID-19 LAB TEST NON-CDC: HCPCS | Performed by: NURSE PRACTITIONER

## 2024-04-24 PROCEDURE — 87502 INFLUENZA DNA AMP PROBE: CPT | Performed by: NURSE PRACTITIONER

## 2024-04-24 NOTE — ED PROVIDER NOTES
Patient Seen in: [facility]      History     Chief Complaint   Patient presents with    Mouth Cold Sores    Sore Throat    Post Nasal Drip            Stated Complaint: sores on lips, sore throat    Subjective:   HPI    Pt is a 63yr old female who is here today with a sore throat and cold sores to the left lower lip.  Pt reports that she hasn't been feeling well and had a temp Tmax 100 at home.  Reports that she started with a cold sore / fever blister 2 days ago.  Denies nausea, vomting.     Objective:   Past Medical History:    Allergic rhinitis    COVID    S/S: cough, cold, aches, pneumonia - no hospitalization     Diverticulosis    Seen on screening colonoscopy    DM2 (diabetes mellitus, type 2) (HCC)    Fatty liver    Incidental finding on CT chest 2020 at [facility]    GERD (gastroesophageal reflux disease)    History of blood transfusion    during      Hyperlipidemia    Hypertension    Hypothyroid    Osteoarthritis    UPPER BACK     Sarcoid    Visual impairment    GLASSES              Past Surgical History:   Procedure Laterality Date     delivery only      Cholecystectomy      Colonoscopy  2020    Dr. Foster [facility]    Hernia surgery  2022    ventral hernia repair    Hysterectomy      Due to DUB    Thoracotomy,ltd,biopsy  2015    To diagnose Sarcoid                Social History     Socioeconomic History    Marital status: Single    Number of children: 1   Occupational History    Occupation: [employer]   Tobacco Use    Smoking status: Former     Current packs/day: 0.00     Average packs/day: 0.5 packs/day for 10.0 years (5.0 ttl pk-yrs)     Types: Cigarettes     Start date:      Quit date:      Years since quittin.3    Smokeless tobacco: Never   Vaping Use    Vaping status: Never Used   Substance and Sexual Activity    Alcohol use: Not Currently    Drug use: Never    Sexual activity: Not Currently     Partners: Male     Birth  control/protection: Hysterectomy   Other Topics Concern    Caffeine Concern No    Exercise Yes     Comment: 3x week 1 hr    Seat Belt Yes     Social Determinants of Health      Received from [facility] [place] [place], [facility] [place]    Social Connections    Received from [facility] [place] [place], [facility] [place] [place]    Housing Stability              Review of Systems    Positive for stated complaint: sores on lips, sore throat  Other systems are as noted in HPI.  Constitutional and vital signs reviewed.      All other systems reviewed and negative except as noted above.    Physical Exam     ED Triage Vitals [04/24/24 1800]   /65   Pulse 58   Resp 16   Temp 98.6 °F (37 °C)   Temp src Temporal   SpO2 98 %   O2 Device None (Room air)       Current:/65   Pulse 58   Temp 98.6 °F (37 °C) (Temporal)   Resp 16   SpO2 98%         Sore throat exam:     VS: Vital signs reviewed. O2 saturation within normal limits for this patient     General: Patient is awake and alert, oriented to person, place and time. Not in acute distress.      HEENT: Head is normocephalic atraumatic.  No scleral injection.  Posterior oropharynx reveals bilateral tonsillar enlargement and erythema without exudates.  No unilateral tonsillar swelling or uvula deviation.  Tympanic membranes gray without bulging.  Landmarks intact. No trismus. Mucous membranes moist.  No oral pallor. Pt has 2 small vesicles on the L lower lip.  No inner mucosal lesions noted,     Neck: No cervical lymphadenopathy. Supple. No meningsmus.      Heart: S1-S2.  Regular rate and rhythm.       Lungs: good inspiratory effort. +air entry bilaterally without wheezes, rhonchi, crackles.  No accessory muscle use or tachypnea.       Extremities: No edema.  Pulses 2+ extremities.        Skin: No rash noted.  No ecchymosis.       CNS: Moves all 4 extremities.  Interacts appropriately.    Differential Diagnosis: viral pharyngitis, strep,   covid, uri     ED Course     Labs Reviewed   POCT RAPID STREP - Normal   RAPID SARS-COV-2 BY PCR - Normal   POCT FLU TEST - Normal    Narrative:     This assay is a rapid molecular in vitro test utilizing nucleic acid amplification of influenza A and B viral RNA.       I have personally  reviewed available prior medical records for any recent pertinent discharge summaries/testing. Patient/family updated on results and plan, a verbalized understanding and agreement with the plan.  I explained to the patient that emergent conditions may arise and to go to the ER for new, worsening or any persistent conditions. I've explained the importance of taking all medicatons as prescribed, follow up, and return precuations,  All questions answered.    Please note that this report has been produced using speech recognition software and may contain errors related to that system including, but not limited to, errors in grammar, punctuation, and spelling, as well as words and phrases that possibly may have been recognized inappropriately.  If there are any questions or concerns, contact the dictating provider for clarification.       MDM      Patient presents with sore throat.  Patient does not have uvula deviation or unilateral tonsillar swelling to indicate tonsillar abscess. No meningsmus. No dysphagia or difficulty handing secretions.  No dental pain.  Afebrile, nontoxic appearing and does not meet SIRS criteria.  Rapid strep test is negative.     Does not appear clinically dehydrated, tolerating oral intake.  Counseled patient on symptomatic treatments.  Recommend follow-up with PCP.  Return to ED precautions discussed with the patient.                                      Medical Decision Making      Disposition and Plan     Clinical Impression:  1. Sore throat    2. Cold sore         Disposition:  Discharge  4/24/2024  6:35 pm    Follow-up:  Lino Nieto MD  [address] [facility]  [id number]  [phone]                Medications Prescribed:  Discharge Medication List as of 4/24/2024  6:36 PM

## 2024-04-24 NOTE — DISCHARGE INSTRUCTIONS
-You were seen in the urgent care today and diagnosed with  viral pharyngitis     -Do not share cups, utensils, glasses, do not put things in your mouth that others have had in yours     -Lots of handwashing over the next few days    -Tylenol and ibuprofen for pain and fever    -Follow-up with your primary care physician    -Return with any worsening symptoms or concerns

## 2024-05-29 DIAGNOSIS — E11.65 TYPE 2 DIABETES MELLITUS WITH HYPERGLYCEMIA, WITHOUT LONG-TERM CURRENT USE OF INSULIN (HCC): Primary | ICD-10-CM

## 2024-05-29 RX ORDER — METFORMIN HYDROCHLORIDE 500 MG/1
TABLET, EXTENDED RELEASE ORAL
Qty: 360 TABLET | Refills: 0 | Status: SHIPPED | OUTPATIENT
Start: 2024-05-29

## 2024-05-29 NOTE — TELEPHONE ENCOUNTER
LOV:     RTC:    FU:    Last Refill:     Month Supply Pendin days with 1 refill    Last office note: - Continue metformin 1000mg twice daily

## 2024-06-26 ENCOUNTER — OFFICE VISIT (OUTPATIENT)
Dept: FAMILY MEDICINE CLINIC | Facility: CLINIC | Age: 64
End: 2024-06-26

## 2024-06-26 VITALS
HEART RATE: 79 BPM | TEMPERATURE: 98 F | WEIGHT: 181 LBS | HEIGHT: 64 IN | OXYGEN SATURATION: 96 % | RESPIRATION RATE: 14 BRPM | BODY MASS INDEX: 30.9 KG/M2 | SYSTOLIC BLOOD PRESSURE: 118 MMHG | DIASTOLIC BLOOD PRESSURE: 68 MMHG

## 2024-06-26 DIAGNOSIS — L98.9 SKIN LESIONS: Primary | ICD-10-CM

## 2024-06-26 DIAGNOSIS — R60.0 BILATERAL LEG EDEMA: ICD-10-CM

## 2024-06-26 PROCEDURE — 99214 OFFICE O/P EST MOD 30 MIN: CPT | Performed by: FAMILY MEDICINE

## 2024-06-26 RX ORDER — HYDROCHLOROTHIAZIDE 12.5 MG/1
12.5 TABLET ORAL DAILY
Qty: 90 TABLET | Refills: 0 | Status: SHIPPED | OUTPATIENT
Start: 2024-06-26

## 2024-06-26 NOTE — PROGRESS NOTES
Madison Muñoz is a 63 year old female.    CC:    Chief Complaint   Patient presents with    Bump     On L shoulder       HPI:  Non healing skin lesion on L posterior shoulder for over a month, can be painful. Similar type lesion on R anterior chest wall for perhaps on year. OTC lotions, steroid cream and triple antibiotic not helping.     Notes swelling in the ankles at the end of her day. Better in the am. No orthopnea.   Allergies:  Allergies   Allergen Reactions    Amoxicillin-Pot Clavulanate NAUSEA ONLY and RASH    Nitroglycerin HYPOTENSION and OTHER (SEE COMMENTS)     \"reacts too fast\" - per pt  Syncope in ER      Tramadol RASH and NAUSEA ONLY     Also double vision      Ozempic (0.25 Or 0.5 Mg-Dose) [Semaglutide] NAUSEA AND VOMITING    Diazepam OTHER (SEE COMMENTS)     Prolonged effect to diazepam 10 (per pt)  Loss of memory, severe sedation        Current Meds:  Current Outpatient Medications   Medication Sig Dispense Refill    hydroCHLOROthiazide 12.5 MG Oral Tab Take 1 tablet (12.5 mg total) by mouth daily. 90 tablet 0    metFORMIN  MG Oral Tablet 24 Hr TAKE TWO TABLETS BY MOUTH TWICE DAILY (WITH MEALS) 360 tablet 0    Glucose Blood (CONTOUR NEXT TEST) In Vitro Strip Use twice daily to check blood sugar 200 strip 3    clotrimazole-betamethasone 1-0.05 % External Cream Apply 1 Application topically to affected area at bedtime. Limit use to just as needed and do not use more than twice weekly. 15 g 0    clobetasol 0.05 % External Ointment Apply 1 Application topically at bedtime. Apply a thin layer to affected area at HS nightly for 12 weeks 30 g 0    losartan 25 MG Oral Tab Take 1 tablet (25 mg total) by mouth daily. 90 tablet 3    citalopram 40 MG Oral Tab Take 1 tablet (40 mg total) by mouth daily. 90 tablet 3    nystatin 100,000 Units/g External Cream Apply TOPICALLY to affected area twice per day 30 g 2    OMEPRAZOLE 20 MG Oral Capsule Delayed Release TAKE ONE CAPSULE BY MOUTH DAILY BEFORE  BREAKFAST 90 capsule 3    LEVOTHYROXINE 88 MCG Oral Tab TAKE ONE TABLET BY MOUTH ONE TIME DAILY 90 tablet 3    cetirizine 10 MG Oral Tab Take 1 tablet (10 mg total) by mouth daily.      Magnesium-Potassium 250-100 MG Oral Tab Take 1 tablet by mouth daily.          History:  Past Medical History:    Allergic rhinitis    COVID    S/S: cough, cold, aches, pneumonia - no hospitalization     Diverticulosis    Seen on screening colonoscopy    DM2 (diabetes mellitus, type 2) (HCC)    Fatty liver    Incidental finding on CT chest 2020 at [facility]    GERD (gastroesophageal reflux disease)    History of blood transfusion    during      Hyperlipidemia    Hypertension    Hypothyroid    Osteoarthritis    UPPER BACK     Sarcoid    Visual impairment    GLASSES      Past Surgical History:   Procedure Laterality Date     delivery only      Cholecystectomy      Colonoscopy  2020    Dr. Foster, [facility]    Hernia surgery  2022    ventral hernia repair    Hysterectomy      Due to DUB    Thoracotomy,ltd,biopsy  2015    To diagnose Sarcoid      Family History   Problem Relation Age of Onset    Kidney Disease Mother     Hypertension Mother     Other (CHF) Mother     Other (Aortic aneurysm) Father     Heart Disease Brother     Other (Leukemia) Brother 72    No Known Problems Brother     No Known Problems Son       Family Status   Relation Status    Mo     Fa     Bro Alive    Bro     Bro Alive    Son Alive, age 41y      Social History     Socioeconomic History    Marital status: Single    Number of children: 1   Occupational History    Occupation: [employer]   Tobacco Use    Smoking status: Former     Current packs/day: 0.00     Average packs/day: 0.5 packs/day for 10.0 years (5.0 ttl pk-yrs)     Types: Cigarettes     Start date:      Quit date:      Years since quittin.5    Smokeless tobacco: Never   Vaping Use    Vaping status: Never Used   Substance and  Sexual Activity    Alcohol use: Not Currently    Drug use: Never    Sexual activity: Not Currently     Partners: Male     Birth control/protection: Hysterectomy   Other Topics Concern    Caffeine Concern No    Exercise Yes     Comment: 3x week 1 hr    Seat Belt Yes     Social Determinants of Health      [facility]    Social Connections    Received from [facility]    Housing Stability        ROS:  General: energy level stable  Cardiovascular/Pulses: Denies palpitations, exertional chest pain or pressure, paroxysmal nocturnal dyspnea, orthopnea    Vitals: /68   Pulse 79   Temp 97.6 °F (36.4 °C) (Temporal)   Resp 14   Ht 5' 4\" (1.626 m)   Wt 181 lb (82.1 kg)   SpO2 96%   BMI 31.07 kg/m²    Reviewed by DAKOTA Nieto M.D.    Physical Exam:  GEN: well developed, well nourished, in no apparent distress  EYE: B conjunctiva and lids normal  HENT: ---  NECK: No lymphadenopathy, thyromegaly or masses  CAR: S1, S2 normal, RRR; no S3, no S4; no click; murmur negative  PULM: clear to auscultation B, no accessory muscle use  GI: not examined  PSYCH: alert and oriented x 3; affect appropriate  SKIN: L posterior shoulder and R anterior chest wall, each, with a nodular rolled border, central ulceration lesion  EXTREMITIES: No clubbing, cyanosis, + 1 B ankle edeam  GENITAL: not examined  LYMPH: no supraclavicular nodes  NEURO: Awake and alert. Normal speech and articulation. No facial droop or asymmetry. Moving all extremities equally.  Bilateral barefoot skin diabetic exam is normal, visualized feet and the appearance is normal.  Bilateral monofilament/sensation of both feet is normal.  Pulsation pedal pulse exam of both lower legs/feet is normal as well.    ASSESSMENT AND PLAN    1. Skin lesions  Worrisome for BCC  - Surgery Referral - In Network for further evaluation and treatment    2. Bilateral leg  edema  Hydrochlorothiazide as directed  F/u in one month for wellness exam and recheck of edema as well as fasting labs.       No follow-ups on file.    Orders for this visit:    No orders of the defined types were placed in this encounter.      SURGERY - INTERNAL    Meds & Refills for this Visit:  Requested Prescriptions     Signed Prescriptions Disp Refills    hydroCHLOROthiazide 12.5 MG Oral Tab 90 tablet 0     Sig: Take 1 tablet (12.5 mg total) by mouth daily.             Authorized by Lino Nieto M.D.

## 2024-06-28 RX ORDER — ATORVASTATIN CALCIUM 40 MG/1
40 TABLET, FILM COATED ORAL NIGHTLY
Qty: 90 TABLET | Refills: 0 | OUTPATIENT
Start: 2024-06-28

## 2024-07-11 RX ORDER — LEVOTHYROXINE SODIUM 88 UG/1
88 TABLET ORAL DAILY
Qty: 90 TABLET | Refills: 2 | Status: SHIPPED | OUTPATIENT
Start: 2024-07-11

## 2024-07-11 RX ORDER — OMEPRAZOLE 20 MG/1
20 CAPSULE, DELAYED RELEASE ORAL DAILY
Qty: 90 CAPSULE | Refills: 2 | Status: SHIPPED | OUTPATIENT
Start: 2024-07-11

## 2024-07-17 RX ORDER — LOSARTAN POTASSIUM 25 MG/1
25 TABLET ORAL DAILY
Qty: 90 TABLET | Refills: 0 | Status: SHIPPED
Start: 2024-07-17

## 2024-07-17 RX ORDER — CITALOPRAM 40 MG/1
40 TABLET ORAL DAILY
Qty: 90 TABLET | Refills: 0 | Status: SHIPPED | OUTPATIENT
Start: 2024-07-17

## 2024-07-24 ENCOUNTER — OFFICE VISIT (OUTPATIENT)
Dept: FAMILY MEDICINE CLINIC | Facility: CLINIC | Age: 64
End: 2024-07-24
Payer: COMMERCIAL

## 2024-07-24 VITALS
BODY MASS INDEX: 34.52 KG/M2 | HEART RATE: 74 BPM | SYSTOLIC BLOOD PRESSURE: 122 MMHG | WEIGHT: 202.19 LBS | DIASTOLIC BLOOD PRESSURE: 68 MMHG | HEIGHT: 64 IN | OXYGEN SATURATION: 96 % | TEMPERATURE: 98 F

## 2024-07-24 DIAGNOSIS — E11.9 TYPE 2 DIABETES MELLITUS WITHOUT COMPLICATION, WITHOUT LONG-TERM CURRENT USE OF INSULIN (HCC): ICD-10-CM

## 2024-07-24 DIAGNOSIS — K21.9 GASTROESOPHAGEAL REFLUX DISEASE, UNSPECIFIED WHETHER ESOPHAGITIS PRESENT: ICD-10-CM

## 2024-07-24 DIAGNOSIS — K76.0 FATTY LIVER: ICD-10-CM

## 2024-07-24 DIAGNOSIS — I10 HYPERTENSION, UNSPECIFIED TYPE: ICD-10-CM

## 2024-07-24 DIAGNOSIS — E03.9 HYPOTHYROIDISM, UNSPECIFIED TYPE: ICD-10-CM

## 2024-07-24 DIAGNOSIS — D86.9 SARCOID: ICD-10-CM

## 2024-07-24 DIAGNOSIS — Z00.00 WELL ADULT EXAM: Primary | ICD-10-CM

## 2024-07-24 DIAGNOSIS — E78.5 HYPERLIPIDEMIA, UNSPECIFIED HYPERLIPIDEMIA TYPE: ICD-10-CM

## 2024-07-24 DIAGNOSIS — F41.9 ANXIETY: ICD-10-CM

## 2024-07-24 LAB
ALT SERPL-CCNC: 21 U/L
ANION GAP SERPL CALC-SCNC: 7 MMOL/L (ref 0–18)
AST SERPL-CCNC: 21 U/L (ref ?–34)
BUN BLD-MCNC: 14 MG/DL (ref 9–23)
CALCIUM BLD-MCNC: 10.2 MG/DL (ref 8.7–10.4)
CHLORIDE SERPL-SCNC: 104 MMOL/L (ref 98–112)
CHOLEST SERPL-MCNC: 218 MG/DL (ref ?–200)
CO2 SERPL-SCNC: 28 MMOL/L (ref 21–32)
CREAT BLD-MCNC: 0.71 MG/DL
CREAT UR-SCNC: 128.9 MG/DL
EGFRCR SERPLBLD CKD-EPI 2021: 95 ML/MIN/1.73M2 (ref 60–?)
ERYTHROCYTE [DISTWIDTH] IN BLOOD BY AUTOMATED COUNT: 13.1 %
EST. AVERAGE GLUCOSE BLD GHB EST-MCNC: 143 MG/DL (ref 68–126)
FASTING PATIENT LIPID ANSWER: YES
FASTING STATUS PATIENT QL REPORTED: YES
GLUCOSE BLD-MCNC: 153 MG/DL (ref 70–99)
HBA1C MFR BLD: 6.6 % (ref ?–5.7)
HCT VFR BLD AUTO: 43.8 %
HDLC SERPL-MCNC: 53 MG/DL (ref 40–59)
HGB BLD-MCNC: 14.5 G/DL
LDLC SERPL CALC-MCNC: 135 MG/DL (ref ?–100)
MCH RBC QN AUTO: 28.7 PG (ref 26–34)
MCHC RBC AUTO-ENTMCNC: 33.1 G/DL (ref 31–37)
MCV RBC AUTO: 86.7 FL
MICROALBUMIN UR-MCNC: 0.7 MG/DL
MICROALBUMIN/CREAT 24H UR-RTO: 5.4 UG/MG (ref ?–30)
NONHDLC SERPL-MCNC: 165 MG/DL (ref ?–130)
OSMOLALITY SERPL CALC.SUM OF ELEC: 292 MOSM/KG (ref 275–295)
PLATELET # BLD AUTO: 256 10(3)UL (ref 150–450)
POTASSIUM SERPL-SCNC: 4.4 MMOL/L (ref 3.5–5.1)
RBC # BLD AUTO: 5.05 X10(6)UL
SODIUM SERPL-SCNC: 139 MMOL/L (ref 136–145)
TRIGL SERPL-MCNC: 169 MG/DL (ref 30–149)
TSI SER-ACNC: 1.24 MIU/ML (ref 0.55–4.78)
VLDLC SERPL CALC-MCNC: 31 MG/DL (ref 0–30)
WBC # BLD AUTO: 8.3 X10(3) UL (ref 4–11)

## 2024-07-24 PROCEDURE — 99396 PREV VISIT EST AGE 40-64: CPT | Performed by: FAMILY MEDICINE

## 2024-07-24 PROCEDURE — 80048 BASIC METABOLIC PNL TOTAL CA: CPT | Performed by: FAMILY MEDICINE

## 2024-07-24 PROCEDURE — 84450 TRANSFERASE (AST) (SGOT): CPT | Performed by: FAMILY MEDICINE

## 2024-07-24 PROCEDURE — 82570 ASSAY OF URINE CREATININE: CPT | Performed by: FAMILY MEDICINE

## 2024-07-24 PROCEDURE — 85027 COMPLETE CBC AUTOMATED: CPT | Performed by: FAMILY MEDICINE

## 2024-07-24 PROCEDURE — 90715 TDAP VACCINE 7 YRS/> IM: CPT | Performed by: FAMILY MEDICINE

## 2024-07-24 PROCEDURE — 84460 ALANINE AMINO (ALT) (SGPT): CPT | Performed by: FAMILY MEDICINE

## 2024-07-24 PROCEDURE — 84443 ASSAY THYROID STIM HORMONE: CPT | Performed by: FAMILY MEDICINE

## 2024-07-24 PROCEDURE — 83036 HEMOGLOBIN GLYCOSYLATED A1C: CPT | Performed by: FAMILY MEDICINE

## 2024-07-24 PROCEDURE — 82043 UR ALBUMIN QUANTITATIVE: CPT | Performed by: FAMILY MEDICINE

## 2024-07-24 PROCEDURE — 90471 IMMUNIZATION ADMIN: CPT | Performed by: FAMILY MEDICINE

## 2024-07-24 PROCEDURE — 80061 LIPID PANEL: CPT | Performed by: FAMILY MEDICINE

## 2024-07-24 RX ORDER — PREDNISONE 20 MG/1
TABLET ORAL
Qty: 18 TABLET | Refills: 0 | Status: SHIPPED | OUTPATIENT
Start: 2024-07-24

## 2024-07-24 NOTE — PROGRESS NOTES
Madison Muñoz is a 64 year old female.    CC:    Chief Complaint   Patient presents with    Physical       HPI:  Patient is here for yearly, wellness exam  Last Lipid:  Lab Results   Component Value Date    CHOLEST 103 11/21/2023    TRIG 107 11/21/2023    HDL 40 11/21/2023    LDL 43 11/21/2023    VLDL 15 11/21/2023    NONHDLC 63 11/21/2023       Last colon cancer screen:  Colonoscopy 06/25/2020 to be repeated 2030    Last mammo: 02/13/2024    Last Pap: na        Immunization History   Administered Date(s) Administered    Covid-19 Vaccine Pfizer 30 mcg/0.3 ml 02/02/2021, 02/23/2021, 01/13/2022, 05/15/2022    FLUZONE 6 months and older PFS 0.5 ml (82846) 09/17/2018, 11/05/2019, 08/31/2020    Influenza 11/15/2008, 10/01/2012, 10/20/2013, 10/21/2014, 11/03/2015, 10/25/2016, 01/16/2018    Pneumovax 23 10/20/2013    TDAP 05/31/2013    Zoster Vaccine Recombinant Adjuvanted (Shingrix) 09/17/2018, 11/20/2018   Pended Date(s) Pended    TDAP 07/24/2024          Patient Active Problem List   Diagnosis    Anxiety: SSRI helping, some stress at work with her company being bought out earlier this year    DM2 (diabetes mellitus, type 2) (HCC): Metformin as directed. Will be having DM eye exam in 9/2024  Last A1c value was 6.3% done 12/8/2023.     GERD (gastroesophageal reflux disease): Low dose PPI working well, no symptoms, no dysphagia    Hyperlipidemia: Labs looking good since she lost weight. No longer on Lipitor, it may have caused some muscle pains    Hypertension: ARB, no home BP reviewed     Hypothyroid: Synthroid as directed, due for labs    Sarcoid: Not seeing Pulmonology at this time as she has done well, she is noting a little chest tightness of late which typically occurs when humidity rises, she would like a Prednisone taper    Fatty liver: has been working on losing weight    Intertrigo: Per Derm    Lichen of skin: Per Derm       Allergies:  Allergies   Allergen Reactions    Amoxicillin-Pot Clavulanate NAUSEA  ONLY and RASH    Nitroglycerin HYPOTENSION and OTHER (SEE COMMENTS)     \"reacts too fast\" - per pt  Syncope in ER      Tramadol RASH and NAUSEA ONLY     Also double vision      Ozempic (0.25 Or 0.5 Mg-Dose) [Semaglutide] NAUSEA AND VOMITING    Diazepam OTHER (SEE COMMENTS)     Prolonged effect to diazepam 10 (per pt)  Loss of memory, severe sedation        Current Meds:  Current Outpatient Medications   Medication Sig Dispense Refill    LOSARTAN 25 MG Oral Tab Take 1 tablet (25 mg total) by mouth daily. 90 tablet 0    CITALOPRAM 40 MG Oral Tab Take 1 tablet (40 mg total) by mouth daily. 90 tablet 0    levothyroxine 88 MCG Oral Tab Take 1 tablet (88 mcg total) by mouth daily. 90 tablet 2    omeprazole 20 MG Oral Capsule Delayed Release Take 1 capsule (20 mg total) by mouth daily. 90 capsule 2    hydroCHLOROthiazide 12.5 MG Oral Tab Take 1 tablet (12.5 mg total) by mouth daily. 90 tablet 0    metFORMIN  MG Oral Tablet 24 Hr TAKE TWO TABLETS BY MOUTH TWICE DAILY (WITH MEALS) 360 tablet 0    Glucose Blood (CONTOUR NEXT TEST) In Vitro Strip Use twice daily to check blood sugar 200 strip 3    clobetasol 0.05 % External Ointment Apply 1 Application topically at bedtime. Apply a thin layer to affected area at HS nightly for 12 weeks 30 g 0    nystatin 100,000 Units/g External Cream Apply TOPICALLY to affected area twice per day 30 g 2    cetirizine 10 MG Oral Tab Take 1 tablet (10 mg total) by mouth daily.      Magnesium-Potassium 250-100 MG Oral Tab Take 1 tablet by mouth daily.          History:  Past Medical History:    Allergic rhinitis    COVID    S/S: cough, cold, aches, pneumonia - no hospitalization     Diverticulosis    Seen on screening colonoscopy    DM2 (diabetes mellitus, type 2) (HCC)    Fatty liver    Incidental finding on CT chest 2020 at [facility]    GERD (gastroesophageal reflux disease)    History of blood transfusion    during      Hyperlipidemia    Hypertension    Hypothyroid     Osteoarthritis    UPPER BACK     Sarcoid    Visual impairment    GLASSES      Past Surgical History:   Procedure Laterality Date     delivery only      Cholecystectomy      Colonoscopy  2020    Satnam Mackenzie    Hernia surgery  2022    ventral hernia repair    Hysterectomy  2005    Due to DUB    Thoracotomy,ltd,biopsy  2015    To diagnose Sarcoid      Family History   Problem Relation Age of Onset    Kidney Disease Mother     Hypertension Mother     Other (CHF) Mother     Other (Aortic aneurysm) Father     Heart Disease Brother     Other (Leukemia) Brother 72    No Known Problems Brother     No Known Problems Son       Family Status   Relation Status    Mo     Fa     Bro Alive    Bro     Bro Alive    Son Alive, age 41y      Social History     Socioeconomic History    Marital status: Single    Number of children: 1   Occupational History    Occupation: [employer]   Tobacco Use    Smoking status: Former     Current packs/day: 0.00     Average packs/day: 0.5 packs/day for 10.0 years (5.0 ttl pk-yrs)     Types: Cigarettes     Start date:      Quit date:      Years since quittin.5    Smokeless tobacco: Never   Vaping Use    Vaping status: Never Used   Substance and Sexual Activity    Alcohol use: Not Currently    Drug use: Never    Sexual activity: Not Currently     Partners: Male     Birth control/protection: Hysterectomy   Other Topics Concern    Caffeine Concern No    Exercise Yes     Comment: 3x week 1 hr    Seat Belt Yes     Social Determinants of Health      [facility]    Social Connections    [facility]    Housing Stability        ROS:  General: energy level stable  Cardiovascular/Pulses: Denies exertional chest pain or pressure. Hydrochlorothiazide I placed her on last month has helped with end of day leg  edema  GI: Denies hematochezia, melena   (female): Denies hematuria    Vitals: /68   Pulse 74   Temp 98.1 °F (36.7 °C) (Temporal)   Ht 5' 4\" (1.626 m)   Wt 202 lb 3.2 oz (91.7 kg)   SpO2 96%   BMI 34.71 kg/m²    Reviewed by DAKOTA Nieto M.D.    Physical Exam:  GEN: well developed, well nourished, in no apparent distress  EYE: B conjunctiva and lids normal  HENT: normocephalic; normal nose, pharynx and TM's  NECK: No lymphadenopathy, thyromegaly or masses  CAR: S1, S2 normal, RRR; no S3, no S4; no click; murmur negative  PULM: clear to auscultation B, no accessory muscle use  GI: normal active BS+, soft, nondistended; no HSM; no masses; no bruits; no masses; nontender, no G/R/R   PSYCH: alert and oriented x 3; affect appropriate  SKIN: not examined  EXTREMITIES: No clubbing, cyanosis or edema  GENITAL: not examined  LYMPH: no supraclavicular nodes  NEURO: Awake and alert. Normal speech and articulation. No facial droop or asymmetry. Moving all extremities equally. Symmetric B patellar DTRs  Bilateral barefoot skin diabetic exam is normal, visualized feet and the appearance is normal.  Bilateral monofilament/sensation of both feet is normal.  Pulsation pedal pulse exam of both lower legs/feet is normal as well.    ASSESSMENT AND PLAN    1. Well adult exam  Tdap today as last year it was not administered  Await results from [facility]  Colonoscopy in 2030  Mammogram in 2025    - ALT(SGPT)  - AST (SGOT)  - Basic Metabolic Panel (8)  - CBC, Platelet; No Differential  - Hemoglobin A1C  - Microalb/Creat Ratio, Random Urine  - TSH W Reflex To Free T4  - Lipid Panel    2. Type 2 diabetes mellitus without complication, without long-term current use of insulin (HCC)  Await results   Keep scheduled eye exam in the Fall    - CT CALCIUM SCORING; Future    - Hemoglobin A1C  - Microalb/Creat Ratio, Random Urine    3. Hypertension, unspecified type  Stable   Continue present medications   Await results     - Basic Metabolic  Panel (8)    4. Hypothyroidism, unspecified type  Await results     - TSH W Reflex To Free T4    5. Anxiety  Stable  Continue SSRI    6. Gastroesophageal reflux disease, unspecified whether esophagitis present  Will stop the Omeprazole and see if symptoms return. If they do then she will try the med at every other day dosing  Await results     - Basic Metabolic Panel (8)  - CBC, Platelet; No Differential    7. Fatty liver  Await results     - ALT(SGPT)  - AST (SGOT)    8. Sarcoid  A bit of an exacerbation.  Prednisone taper as directed   Await results     - Basic Metabolic Panel (8)  - CBC, Platelet; No Differential    9. Hyperlipidemia, unspecified hyperlipidemia type  Not on Lipitor as levels have been exceptionally good of late with her weight loss and it may have caused her side effect  Await lipids  CT heart score       No follow-ups on file.    Orders for this visit:    Orders Placed This Encounter   Procedures    ALT(SGPT)     Standing Status:   Future     Number of Occurrences:   1     Standing Expiration Date:   7/24/2025    AST (SGOT)     Standing Status:   Future     Number of Occurrences:   1     Standing Expiration Date:   7/24/2025    Basic Metabolic Panel (8)     Standing Status:   Future     Number of Occurrences:   1     Standing Expiration Date:   7/24/2025    CBC, Platelet; No Differential     Standing Status:   Future     Number of Occurrences:   1     Standing Expiration Date:   7/24/2025    Hemoglobin A1C     Standing Status:   Future     Number of Occurrences:   1     Standing Expiration Date:   7/24/2025    Microalb/Creat Ratio, Random Urine     Standing Status:   Future     Number of Occurrences:   1     Standing Expiration Date:   7/24/2025    TSH W Reflex To Free T4     Standing Status:   Future     Number of Occurrences:   1     Standing Expiration Date:   7/24/2025    Lipid Panel     Standing Status:   Future     Number of Occurrences:   1     Standing Expiration Date:   7/24/2025     TdaP (Adacel, Boostrix) [85405]       TETANUS, DIPHTHERIA TOXOIDS AND ACELLULAR PERTUSIS VACCINE (TDAP), >7 YEARS, IM USE  CT CALCIUM SCORING    Meds & Refills for this Visit:  Requested Prescriptions     Signed Prescriptions Disp Refills    predniSONE 20 MG Oral Tab 18 tablet 0     Sig: 3 qd x 3 days, then 2 qd x 3 days, then 1 qd x 3 days with food.             Authorized by Lino Nieto M.D.

## 2024-07-25 DIAGNOSIS — E78.5 HYPERLIPIDEMIA, UNSPECIFIED HYPERLIPIDEMIA TYPE: Primary | ICD-10-CM

## 2024-08-17 DIAGNOSIS — E11.65 TYPE 2 DIABETES MELLITUS WITH HYPERGLYCEMIA, WITHOUT LONG-TERM CURRENT USE OF INSULIN (HCC): ICD-10-CM

## 2024-08-19 RX ORDER — METFORMIN HYDROCHLORIDE 500 MG/1
TABLET, EXTENDED RELEASE ORAL
Qty: 120 TABLET | Refills: 0 | Status: SHIPPED | OUTPATIENT
Start: 2024-08-19

## 2024-08-19 NOTE — TELEPHONE ENCOUNTER
Endocrine Refill protocol for metformin    Protocol Criteria:  FAILED    -Appointment with Endocrinology completed in the last 6 months or scheduled in the next 3 months    -GFR greater than or equal to 40 in the past 12 months     -A1c result below 8.5% in the past 6 months      Verify the above has been completed or scheduled in the appropriate timeline. If so can send a 90 day supply with 1 refill.     Last completed office visit:6/14/2024 Nalini Carlos APN   Next scheduled Follow up:   Future Appointments   Date Time Provider Department Center   8/25/2024 10:00 AM PF CT 1 PF CT [place]   8/27/2024 10:45 AM Rosemarie Verma APN [facility] [place]   9/12/2024  4:00 PM Lazaro Arias DO [facility] [place]       Last GFR result:    Lab Results   Component Value Date    EGFRCR 95 07/24/2024     Last A1c result: Last A1C result: 6.6% done 7/24/2024.    Refill protocol failed- last office visit: 12/8/23.    Attempt to phone patient, no answer, left message to call back. MCM sent.

## 2024-08-20 ENCOUNTER — OFFICE VISIT (OUTPATIENT)
Facility: CLINIC | Age: 64
End: 2024-08-20
Payer: COMMERCIAL

## 2024-08-20 VITALS
SYSTOLIC BLOOD PRESSURE: 128 MMHG | OXYGEN SATURATION: 98 % | HEIGHT: 64 IN | HEART RATE: 53 BPM | WEIGHT: 215.19 LBS | BODY MASS INDEX: 36.74 KG/M2 | DIASTOLIC BLOOD PRESSURE: 76 MMHG

## 2024-08-20 DIAGNOSIS — E78.2 MIXED HYPERLIPIDEMIA: ICD-10-CM

## 2024-08-20 DIAGNOSIS — E11.9 TYPE 2 DIABETES MELLITUS WITHOUT COMPLICATION, WITHOUT LONG-TERM CURRENT USE OF INSULIN (HCC): Primary | ICD-10-CM

## 2024-08-20 DIAGNOSIS — I10 HYPERTENSION, UNSPECIFIED TYPE: ICD-10-CM

## 2024-08-20 PROCEDURE — 99213 OFFICE O/P EST LOW 20 MIN: CPT

## 2024-08-20 RX ORDER — METFORMIN HCL 500 MG
1000 TABLET, EXTENDED RELEASE 24 HR ORAL 2 TIMES DAILY WITH MEALS
Qty: 360 TABLET | Refills: 0 | Status: SHIPPED | OUTPATIENT
Start: 2024-08-20

## 2024-08-20 NOTE — PROGRESS NOTES
[facility] Endocrinology Clinic Note    Name: Madison Muñoz    Date: 2024    Madison Muñoz is a 64 year old female who presents for evaluation of T2DM management.     Chief complaint: Diabetes (Patient presents for DM follow up. States blood sugars were improved but feels they are trending higher recently. Using glucometer to check BG. Normally around 135. )       Subjective:   Initial HPI consult in 2023:  A1C 7.7% at today's OV, random non fasting . Prev pt at [facility], insurance changed  Diagnosed age: 51  Pt denies hx of hospitalization for DM and/or pancreatitis   Family history of DM: no fam hx  Of note, uses nystatin cream daily and blistering in vaginal area d/t chronic yeast infection she's had for months. Is taking Jardiance. Previously trialed Ozempic/Trulicity and stopped because she didn't like that she was nauseated but notes that she was not instructed of how to change diet to better tolerate GLPs (smaller or low fat meals). Goal is weight loss- has meeting w/ [facility] weight management clinic in      Interim hx:   2023- Started Ozempic, did not tolerate again and having GI s/e - nausea, loss of appetite, vomiting, and diarrhea. Last Ozempic injection was ~9 days ago, Wednesday. Notes she is taking glimepiride 4mg daily (last OV thought she was off)     2023- A1C 6.3%. Taking metformin 1000mg BID, glimepiride 4mg daily (stopped d/t BG in the low 80s in the AM by PCP), januvia 100mg daily  Blood Glucose log/CGM: FBG: improving   Hx of hypothyroidism- - On LT4 88mcg, managed by PCP     Dec 2023-  Last A1c value was 6.1% done 2023. She has been doing keto diet for the last 8 months; has lost nearly 80 lbs (still eating fruit); feels great  Current DM meds: metformin 1G BID, januvia 100mg daily  Checking 1x/day: FBs-100s  Hx of hypothyroidism- Still with some ongoing fatigue. Last TFTs May 2023. On LT4 88mcg      2024-aquiles/ Matri EARLY.Last A1c value  was 6.6% done 7/24/2024. Changes made last visit include stopping januvia as her BG were to goal due to weight loss and low carb diet. Notes higher stress r/t work. Notes weight gain and incr'd fatigue. She stopped her keto diet.   DM meds at visit: metformin 1000mg twice daily      Previously trialed/failed DM meds: Acarbose 25mg TID,  Trulicity - intolerant due to N/V, glimepiride- stopped to start other meds, jardiance 25mg- stopped d/t recurrent yeast infections, ozempic - bad GI side effects    History/Other:    Allergies, PMH, SocHx and FHx reviewed and updated as appropriate in Epic on    metFORMIN  MG Oral Tablet 24 Hr Take 2 tablets (1,000 mg total) by mouth 2 (two) times daily with meals. 360 tablet 0    predniSONE 20 MG Oral Tab 3 qd x 3 days, then 2 qd x 3 days, then 1 qd x 3 days with food. 18 tablet 0    LOSARTAN 25 MG Oral Tab Take 1 tablet (25 mg total) by mouth daily. 90 tablet 0    CITALOPRAM 40 MG Oral Tab Take 1 tablet (40 mg total) by mouth daily. 90 tablet 0    levothyroxine 88 MCG Oral Tab Take 1 tablet (88 mcg total) by mouth daily. 90 tablet 2    omeprazole 20 MG Oral Capsule Delayed Release Take 1 capsule (20 mg total) by mouth daily. 90 capsule 2    hydroCHLOROthiazide 12.5 MG Oral Tab Take 1 tablet (12.5 mg total) by mouth daily. 90 tablet 0    Glucose Blood (CONTOUR NEXT TEST) In Vitro Strip Use twice daily to check blood sugar 200 strip 3    clobetasol 0.05 % External Ointment Apply 1 Application topically at bedtime. Apply a thin layer to affected area at HS nightly for 12 weeks 30 g 0    nystatin 100,000 Units/g External Cream Apply TOPICALLY to affected area twice per day 30 g 2    cetirizine 10 MG Oral Tab Take 1 tablet (10 mg total) by mouth daily.      Magnesium-Potassium 250-100 MG Oral Tab Take 1 tablet by mouth daily.       Allergies   Allergen Reactions    Amoxicillin-Pot Clavulanate NAUSEA ONLY and RASH    Nitroglycerin HYPOTENSION and OTHER (SEE COMMENTS)      \"reacts too fast\" - per pt  Syncope in ER      Tramadol RASH and NAUSEA ONLY     Also double vision      Lipitor [Atorvastatin] MYALGIA     Possibly cause her some chest muscle pain. Has Sarcoid which could have also caused similar symptoms    Ozempic (0.25 Or 0.5 Mg-Dose) [Semaglutide] NAUSEA AND VOMITING    Diazepam OTHER (SEE COMMENTS)     Prolonged effect to diazepam 10 (per pt)  Loss of memory, severe sedation       Current Outpatient Medications   Medication Sig Dispense Refill    metFORMIN  MG Oral Tablet 24 Hr Take 2 tablets (1,000 mg total) by mouth 2 (two) times daily with meals. 360 tablet 0    predniSONE 20 MG Oral Tab 3 qd x 3 days, then 2 qd x 3 days, then 1 qd x 3 days with food. 18 tablet 0    LOSARTAN 25 MG Oral Tab Take 1 tablet (25 mg total) by mouth daily. 90 tablet 0    CITALOPRAM 40 MG Oral Tab Take 1 tablet (40 mg total) by mouth daily. 90 tablet 0    levothyroxine 88 MCG Oral Tab Take 1 tablet (88 mcg total) by mouth daily. 90 tablet 2    omeprazole 20 MG Oral Capsule Delayed Release Take 1 capsule (20 mg total) by mouth daily. 90 capsule 2    hydroCHLOROthiazide 12.5 MG Oral Tab Take 1 tablet (12.5 mg total) by mouth daily. 90 tablet 0    Glucose Blood (CONTOUR NEXT TEST) In Vitro Strip Use twice daily to check blood sugar 200 strip 3    clobetasol 0.05 % External Ointment Apply 1 Application topically at bedtime. Apply a thin layer to affected area at HS nightly for 12 weeks 30 g 0    nystatin 100,000 Units/g External Cream Apply TOPICALLY to affected area twice per day 30 g 2    cetirizine 10 MG Oral Tab Take 1 tablet (10 mg total) by mouth daily.      Magnesium-Potassium 250-100 MG Oral Tab Take 1 tablet by mouth daily.       Past Medical History:    Allergic rhinitis    COVID    S/S: cough, cold, aches, pneumonia - no hospitalization     Diverticulosis    Seen on screening colonoscopy    DM2 (diabetes mellitus, type 2) (HCC)    Fatty liver    Incidental finding on CT chest 7/2020  at [facility]    GERD (gastroesophageal reflux disease)    History of blood transfusion    during      Hyperlipidemia    Hypertension    Hypothyroid    Osteoarthritis    UPPER BACK     Sarcoid    Visual impairment    GLASSES     Family History   Problem Relation Age of Onset    Kidney Disease Mother     Hypertension Mother     Other (CHF) Mother     Other (Aortic aneurysm) Father     Heart Disease Brother     Other (Leukemia) Brother 72    No Known Problems Brother     No Known Problems Son      Social history: Reviewed.      ROS/Exam    REVIEW OF SYSTEMS: Ten point review of systems has been performed and is otherwise negative and/or non-contributory, except as described above.     VITALS  Vitals:    24 1615   BP: 128/76   BP Location: Left arm   Patient Position: Sitting   Cuff Size: adult   Pulse: 53   SpO2: 98%   Weight: 215 lb 3.2 oz (97.6 kg)   Height: 5' 4\" (1.626 m)       Wt Readings from Last 6 Encounters:   24 215 lb 3.2 oz (97.6 kg)   24 202 lb 3.2 oz (91.7 kg)   24 181 lb (82.1 kg)   23 187 lb 6.4 oz (85 kg)   23 193 lb 3.2 oz (87.6 kg)   23 208 lb 9.6 oz (94.6 kg)       PHYSICAL EXAM  CONSTITUTIONAL:  awake, alert, cooperative, no apparent distress, and appears stated age   PSYCH: normal affect  LUNGS: breathing comfortably  CARDIOVASCULAR:  regular rate     Labs/Imaging: Pertinent imaging reviewed.    Overall glucose control:  Lab Results   Component Value Date    A1C 6.6 (H) 2024    A1C 6.3 (H) 2023    A1C 6.1 (H) 2023    A1C 6.3 (H) 2023    A1C 7.7 (A) 03/10/2023       Supplementary Documentation:   -Surveillance for Diabetes Complications & Risks  Foot exam/neuropathy: Last Foot Exam: 24    Retinopathy screening: Upcoming visit w/ Dr. Oppenheim  No data recordedNo data recorded    Assessment & Plan:     ICD-10-CM    1. Type 2 diabetes mellitus without complication, without long-term current use of insulin (HCC)  E11.9  metFORMIN  MG Oral Tablet 24 Hr      2. Hypertension, unspecified type  I10       3. BMI 36.0-36.9,adult  Z68.36           Madison Muñoz is a very pleasant 64 year old female here for evaluation of:    #Diabetes- PMHx of Type 2 diabetes mellitus diagnosed age 51.  Intolerant to several type 2 diabetes medications.  She saw great success with weight loss and diabetes management with keto diet.  She notes more depressive symptoms as of late due to job changes, and her motivation is a bit lower.  Overall, her A1c control still appropriate on metformin monotherapy.  We discussed at this time, she is appropriate to discharge to primary care office for management of diabetes.  If diabetes control was to worsen, she is more than welcome to come back to endocrinology clinic.  She is in agreement with this plan.       Last A1c value was 6.6% done 7/24/2024. Goal <7%. Importance of better glucose control in preventing onset/progression of end-organ damage discussed, as well as goals of therapy and clinical significance of A1C.     -Continue metformin 1000 mg twice daily, refill sent, next refill to go to PCP  - did not tolerate ozempic (tried x2),  nausea, loss of appetite, vomiting   - intolerant of jardiance- ongoing yeast infection- working with gyne on diagnosis  - check BG 1x/day   -See above header \"Supplementary Documentation\" for surveillance for diabetes complications & risks    #Nephropathy screening  stable  Lab Results   Component Value Date    EGFRCR 95 07/24/2024    MICROALBCREA 5.4 07/24/2024      #Hypertension SBP is to goal <130   BP Readings from Last 1 Encounters:   08/20/24 128/76   BP Meds: hydroCHLOROthiazide Tabs - 12.5 MG; losartan Tabs - 25 MG     #Hyperlipidemia   -  LDL is not to goal <70  Lab Results   Component Value Date     (H) 07/24/2024    TRIG 169 (H) 07/24/2024   Cholesterol Meds:  she is discussing statin start with her PCP-- per ADA guidelines, statin therapy recommended  in anyone >age 40 with Dx of diabetes, would be reasonable to start., She is going to start with heart scan.       Did not schedule follow up appointment at this time as she is doing well on metformin monotherapy and no longer in need of specialty care. Return if A1C >8%, feel free to return to endo otherwise continue f/u with PCP.    8/20/2024  NNEKA Palacios    Note to patient: The 21 Century Cures Act makes medical notes like these available to patients in the interest of transparency. However, be advised this is a medical document. It is intended as peer to peer communication. It is written in medical language and may contain abbreviations or verbiage that are unfamiliar. It may appear blunt or direct. Medical documents are intended to carry relevant information, facts as evident, and the clinical opinion of the practitioner.

## 2024-08-20 NOTE — PATIENT INSTRUCTIONS
Return if A1C >8%, feel free to return to endo otherwise continue f/u with PCP.    - continue metformin 1000mg twice daily

## 2024-08-26 ENCOUNTER — HOSPITAL ENCOUNTER (EMERGENCY)
Facility: HOSPITAL | Age: 64
Discharge: HOME | End: 2024-08-26
Attending: EMERGENCY MEDICINE
Payer: COMMERCIAL

## 2024-08-26 ENCOUNTER — PATIENT MESSAGE (OUTPATIENT)
Dept: FAMILY MEDICINE CLINIC | Facility: CLINIC | Age: 64
End: 2024-08-26

## 2024-08-26 ENCOUNTER — APPOINTMENT (OUTPATIENT)
Dept: GENERAL RADIOLOGY | Facility: HOSPITAL | Age: 64
End: 2024-08-26
Attending: EMERGENCY MEDICINE
Payer: COMMERCIAL

## 2024-08-26 VITALS
WEIGHT: 200 LBS | TEMPERATURE: 98 F | BODY MASS INDEX: 34 KG/M2 | HEART RATE: 59 BPM | SYSTOLIC BLOOD PRESSURE: 160 MMHG | RESPIRATION RATE: 24 BRPM | DIASTOLIC BLOOD PRESSURE: 60 MMHG | OXYGEN SATURATION: 99 %

## 2024-08-26 DIAGNOSIS — U07.1 COVID-19: Primary | ICD-10-CM

## 2024-08-26 LAB
FLUAV + FLUBV RNA SPEC NAA+PROBE: NEGATIVE
FLUAV + FLUBV RNA SPEC NAA+PROBE: NEGATIVE
RSV RNA SPEC NAA+PROBE: NEGATIVE
SARS-COV-2 RNA RESP QL NAA+PROBE: DETECTED

## 2024-08-26 PROCEDURE — 99283 EMERGENCY DEPT VISIT LOW MDM: CPT

## 2024-08-26 PROCEDURE — 99284 EMERGENCY DEPT VISIT MOD MDM: CPT

## 2024-08-26 PROCEDURE — 0241U SARS-COV-2/FLU A AND B/RSV BY PCR (GENEXPERT): CPT

## 2024-08-26 PROCEDURE — 71045 X-RAY EXAM CHEST 1 VIEW: CPT | Performed by: EMERGENCY MEDICINE

## 2024-08-26 PROCEDURE — 0241U SARS-COV-2/FLU A AND B/RSV BY PCR (GENEXPERT): CPT | Performed by: EMERGENCY MEDICINE

## 2024-08-26 RX ORDER — ACETAMINOPHEN 500 MG
1000 TABLET ORAL ONCE
Status: COMPLETED | OUTPATIENT
Start: 2024-08-26 | End: 2024-08-26

## 2024-08-26 RX ORDER — BENZONATATE 100 MG/1
100 CAPSULE ORAL 3 TIMES DAILY PRN
Qty: 30 CAPSULE | Refills: 0 | Status: SHIPPED | OUTPATIENT
Start: 2024-08-26 | End: 2024-09-25

## 2024-08-26 NOTE — ED PROVIDER NOTES
Patient Seen in: [facility] Emergency Department      History     Chief Complaint   Patient presents with    Cough     Stated Complaint: cough \"lung burning\" hx of sarcoidosis    Subjective:   HPI    Patient is a 64-year-old female who reports she has been sick for the last 3 days.  Started with fever to 102, cough that has been pretty consistent.  It is productive of sputum.  Her \"lungs\" are sore from coughing so much.  No shortness of breath.  Feels fatigued.  No GI symptoms.    Objective:   Past Medical History:    Allergic rhinitis    COVID    S/S: cough, cold, aches, pneumonia - no hospitalization     Diverticulosis    Seen on screening colonoscopy    DM2 (diabetes mellitus, type 2) (HCC)    Essential hypertension    Fatty liver    Incidental finding on CT chest 2020 at [facility]    GERD (gastroesophageal reflux disease)    History of blood transfusion    during      Hyperlipidemia    Hypertension    Hypothyroid    Osteoarthritis    UPPER BACK     Sarcoid    Visual impairment    GLASSES              No pertinent past surgical history.              No pertinent social history.            Review of Systems    Positive for stated Chief Complaint: Cough    Other systems are as noted in HPI.  Constitutional and vital signs reviewed.      All other systems reviewed and negative except as noted above.    Physical Exam     ED Triage Vitals   BP 24 1005 (!) 170/83   Pulse 24 1003 66   Resp 24 1003 24   Temp 24 1003 98.1 °F (36.7 °C)   Temp src 24 1003 Temporal   SpO2 24 1003 95 %   O2 Device 24 1003 None (Room air)       Current Vitals:   Vital Signs  BP: 160/60  Pulse: 59  Resp: 24  Temp: 98.1 °F (36.7 °C)  Temp src: Temporal  MAP (mmHg): 91    Oxygen Therapy  SpO2: 99 %  O2 Device: None (Room air)            Physical Exam  Vitals and nursing note reviewed.   Constitutional:       Appearance: She is well-developed.   HENT:      Head: Normocephalic and atraumatic.    Eyes:      Conjunctiva/sclera: Conjunctivae normal.      Pupils: Pupils are equal, round, and reactive to light.   Cardiovascular:      Rate and Rhythm: Normal rate and regular rhythm.      Heart sounds: Normal heart sounds.   Pulmonary:      Effort: Pulmonary effort is normal.      Breath sounds: Normal breath sounds.      Comments: Clear to auscultation bilaterally with good aeration.  No distress.  Abdominal:      General: Bowel sounds are normal.      Palpations: Abdomen is soft.   Musculoskeletal:         General: Normal range of motion.   Skin:     General: Skin is warm and dry.   Neurological:      Mental Status: She is alert and oriented to person, place, and time.               ED Course     Labs Reviewed   SARS-COV-2/FLU A AND B/RSV BY PCR (GENEXPERT) - Abnormal; Notable for the following components:       Result Value    SARS-CoV-2 (COVID-19) - (GeneXpert) Detected (*)     All other components within normal limits    Narrative:     This test is intended for the qualitative detection and differentiation of SARS-CoV-2, influenza A, influenza B, and respiratory syncytial virus (RSV) viral RNA in nasopharyngeal or nares swabs from individuals suspected of respiratory viral infection consistent with COVID-19 by their healthcare provider. Signs and symptoms of respiratory viral infection due to SARS-CoV-2, influenza, and RSV can be similar.    Test performed using the Xpert Xpress SARS-CoV-2/FLU/RSV (real time RT-PCR)  assay on the GeneXpert instrument, [employer], [place] [address].   This test is being used under the Food and Drug Administration's Emergency Use Authorization.    The authorized Fact Sheet for Healthcare Providers for this assay is available upon request from the laboratory.             XR CHEST AP PORTABLE  (CPT=71045)    Result Date: 8/26/2024  PROCEDURE:  XR CHEST AP PORTABLE  (CPT=71045)  TECHNIQUE:  AP chest radiograph was obtained.  COMPARISON:  [place], XR, XR CHEST PA + LAT CHEST  (CPT=71046), 9/07/2021, 11:52 AM.  INDICATIONS:  cough lung burning hx of sarcoidosis  PATIENT STATED HISTORY: (As transcribed by Technologist)  patient states history of sarcoidosis. patient states worse shortness of breath and chest pain.              CONCLUSION:   Normal cardiac and mediastinal contours.  No pulmonary edema or focal airspace consolidation.  The pleural spaces are clear.  Regional osseous structures are normal.    LOCATION:  [facility]      Dictated by (CST): Frances Cramer MD on 8/26/2024 at 11:03 AM     Finalized by (CST): Frances Cramer MD on 8/26/2024 at 11:03 AM               MDM      64-year-old female presenting with 3 days of fevers to 102, productive cough, fatigue and myalgias, headache.  Well-appearing here, no hypoxia, no distress.  Hypertensive but she is not febrile and not ill-appearing.  Differential includes pneumonia, flu, COVID, other viral upper respiratory infection.  GEN expert, chest x-ray ordered.      Update at 11:15 AM.  COVID is positive.  Chest x-ray is clear.  Discussed with patient.  Discussed utility of Paxlovid although she states she has taken it before with a COVID infection and made her feel awful and she does not want it.  Tessalon for cough, Tylenol/ibuprofen for headaches.        Past Medical History-sarcoidosis, hypertension    Differential diagnosis before testing included COVID, flu, pneumonia    Co-morbidities that add to the complexity of management include: None    Testing ordered during this visit included GEN expert, chest x-ray    Radiographic images  I personally reviewed the radiographs and my individual interpretation shows no infiltrate or pneumothorax  I also reviewed the official reports that showed no infiltrate or pneumothorax      History obtained by an independent source included from family at bedside        Medications Provided: Tylenol            Disposition:          Discharge  I have discussed with the patient the results of test, differential  diagnosis, treatment plan, warning signs and symptoms which should prompt immediate return.  They expressed understanding of these instructions and agrees to the following plan provided.  They were given written discharge instructions and agrees to return for any concerns and voiced understanding and all questions were answered.                           Medical Decision Making      Disposition and Plan     Clinical Impression:  1. COVID-19         Disposition:  Discharge  8/26/2024 11:19 am    Follow-up:  Lino Nieto MD  [address] [facility] [address]  [phone]    Follow up            Medications Prescribed:  Current Discharge Medication List        START taking these medications    Details   benzonatate 100 MG Oral Cap Take 1 capsule (100 mg total) by mouth 3 (three) times daily as needed for cough.  Qty: 30 capsule, Refills: 0

## 2024-08-26 NOTE — TELEPHONE ENCOUNTER
From: Madison Muñoz  To: Lino Nieto  Sent: 8/26/2024 5:41 AM CDT  Subject: Feeling bad    I have been running a fever since Friday night 102. Got it to 99 yesterday. Cough and headache. Lungs hurt. Not nguyen if I should go to urgent care or the emergency room ( because of my sarcoidosis). Ache all over. Tested neg for Covid. What’s your suggestion? Thanks.

## 2024-08-26 NOTE — TELEPHONE ENCOUNTER
Patient notified via detailed voicemail left at cell number (ok per  HIPAA consent)   Advised can call office to discuss but if feeling poorly should be evaluated in ER.

## 2024-08-26 NOTE — TELEPHONE ENCOUNTER
Patient called back. States Friday at 1 am  she woke up with 102.5 fever.  Was able to get fever down to 99 yesterday.  States still has cough, headache an burning in lungs.   Patient states she has sarcoidosis and anytime she has illness is concerned it will go to lungs.     Discussed with patient she needs evaluation. Patient prefers UC over ER.  Discussed with patient UC can evaluate her symptoms but may ultimately end up sending her ER. Patient verbalized understanding. Will have someone drive her to UC

## 2024-08-26 NOTE — ED INITIAL ASSESSMENT (HPI)
64YF c/c of sob Pt state that she been having fever, cough and sob since Friday Pt state that she also having mid back pain with her sob

## 2024-08-26 NOTE — DISCHARGE INSTRUCTIONS
Benzonatate as needed for cough.  Tylenol or ibuprofen for headaches or other pain.  Quarantine through Thursday.

## 2024-09-19 RX ORDER — HYDROCHLOROTHIAZIDE 12.5 MG/1
12.5 TABLET ORAL DAILY
Qty: 90 TABLET | Refills: 0 | Status: SHIPPED | OUTPATIENT
Start: 2024-09-19

## 2024-10-03 RX ORDER — LOSARTAN POTASSIUM 25 MG/1
25 TABLET ORAL DAILY
Qty: 90 TABLET | Refills: 2 | Status: SHIPPED | OUTPATIENT
Start: 2024-10-03

## 2024-10-03 RX ORDER — CITALOPRAM HYDROBROMIDE 40 MG/1
40 TABLET ORAL DAILY
Qty: 90 TABLET | Refills: 2 | Status: SHIPPED | OUTPATIENT
Start: 2024-10-03

## 2024-10-03 NOTE — TELEPHONE ENCOUNTER
Please let patient or caregiver know or leave message that refill request for the medication/s listed below has/have come to our office. The refills have been sent.    Did she get her DM exam last month? If so let's try and get the report.    Thanks     Requested Prescriptions     Signed Prescriptions Disp Refills    CITALOPRAM 40 MG Oral Tab 90 tablet 2     Sig: Take 1 tablet (40 mg total) by mouth daily.     Authorizing Provider: ISABELLA GARDINER    LOSARTAN 25 MG Oral Tab 90 tablet 2     Sig: Take 1 tablet (25 mg total) by mouth daily.     Authorizing Provider: ISABELLA GARDINER

## 2024-10-03 NOTE — TELEPHONE ENCOUNTER
Patient's name and  verified   Patient had her Diabetic eye exam at [facility]. Patient is going to stop by and  the eye exam report.   Patient notified and verbalized an understanding

## 2024-12-12 DIAGNOSIS — E11.9 TYPE 2 DIABETES MELLITUS WITHOUT COMPLICATION, WITHOUT LONG-TERM CURRENT USE OF INSULIN (HCC): ICD-10-CM

## 2024-12-12 RX ORDER — METFORMIN HYDROCHLORIDE 500 MG/1
1000 TABLET, EXTENDED RELEASE ORAL 2 TIMES DAILY WITH MEALS
Qty: 360 TABLET | Refills: 1 | Status: SHIPPED | OUTPATIENT
Start: 2024-12-12

## 2024-12-12 RX ORDER — HYDROCHLOROTHIAZIDE 12.5 MG/1
12.5 TABLET ORAL DAILY
Qty: 90 TABLET | Refills: 1 | Status: SHIPPED | OUTPATIENT
Start: 2024-12-12

## 2024-12-12 NOTE — TELEPHONE ENCOUNTER
Please let patient or caregiver know or leave message that refill request for the medication/s listed below has/have come to our office. The refills have been sent.    It appears she may be overdue for the DM eye exam. If done this year then let's get the report. If not then perhaps she can see her eye doctor in the near future and have the report sent to us.    Thanks     Requested Prescriptions     Signed Prescriptions Disp Refills    hydroCHLOROthiazide 12.5 MG Oral Tab 90 tablet 1     Sig: Take 1 tablet (12.5 mg total) by mouth daily.     Authorizing Provider: ISABELLA GARDINER

## 2024-12-12 NOTE — TELEPHONE ENCOUNTER
Left message detail message per (HIPPA form) with recommendation.   See TE on 10/03/2024   Patient to call back if they have any questions

## 2025-01-21 ENCOUNTER — PATIENT MESSAGE (OUTPATIENT)
Dept: FAMILY MEDICINE CLINIC | Facility: CLINIC | Age: 65
End: 2025-01-21

## 2025-01-21 RX ORDER — PREDNISONE 20 MG/1
TABLET ORAL
Qty: 18 TABLET | Refills: 0 | Status: SHIPPED | OUTPATIENT
Start: 2025-01-21 | End: 2025-01-30

## 2025-01-26 ENCOUNTER — APPOINTMENT (OUTPATIENT)
Dept: GENERAL RADIOLOGY | Age: 65
End: 2025-01-26
Attending: NURSE PRACTITIONER
Payer: COMMERCIAL

## 2025-01-26 ENCOUNTER — HOSPITAL ENCOUNTER (OUTPATIENT)
Age: 65
Discharge: HOME | End: 2025-01-26
Payer: COMMERCIAL

## 2025-01-26 VITALS
BODY MASS INDEX: 40.29 KG/M2 | TEMPERATURE: 100 F | OXYGEN SATURATION: 94 % | HEIGHT: 64 IN | RESPIRATION RATE: 18 BRPM | WEIGHT: 236 LBS | HEART RATE: 81 BPM

## 2025-01-26 DIAGNOSIS — J06.9 VIRAL URI WITH COUGH: Primary | ICD-10-CM

## 2025-01-26 LAB — SARS-COV-2 RNA RESP QL NAA+PROBE: NOT DETECTED

## 2025-01-26 PROCEDURE — U0002 COVID-19 LAB TEST NON-CDC: HCPCS | Performed by: NURSE PRACTITIONER

## 2025-01-26 PROCEDURE — 99204 OFFICE O/P NEW MOD 45 MIN: CPT | Performed by: NURSE PRACTITIONER

## 2025-01-26 PROCEDURE — S0119 ONDANSETRON 4 MG: HCPCS | Performed by: NURSE PRACTITIONER

## 2025-01-26 PROCEDURE — 71046 X-RAY EXAM CHEST 2 VIEWS: CPT | Performed by: NURSE PRACTITIONER

## 2025-01-26 RX ORDER — ONDANSETRON 4 MG/1
4 TABLET, ORALLY DISINTEGRATING ORAL ONCE
Status: COMPLETED | OUTPATIENT
Start: 2025-01-26 | End: 2025-01-26

## 2025-01-26 RX ORDER — ONDANSETRON 4 MG/1
4 TABLET, ORALLY DISINTEGRATING ORAL EVERY 4 HOURS PRN
Qty: 10 TABLET | Refills: 0 | Status: SHIPPED | OUTPATIENT
Start: 2025-01-26 | End: 2025-02-02

## 2025-01-26 RX ORDER — BENZONATATE 100 MG/1
100 CAPSULE ORAL 3 TIMES DAILY PRN
Qty: 30 CAPSULE | Refills: 0 | Status: SHIPPED | OUTPATIENT
Start: 2025-01-26 | End: 2025-02-25

## 2025-01-26 RX ORDER — ALBUTEROL SULFATE 90 UG/1
2 INHALANT RESPIRATORY (INHALATION) EVERY 4 HOURS PRN
Qty: 1 EACH | Refills: 0 | Status: SHIPPED | OUTPATIENT
Start: 2025-01-26 | End: 2025-02-25

## 2025-01-26 NOTE — ED INITIAL ASSESSMENT (HPI)
Pt with c/o cough that started on Monday.  Pt with c/o coughing fits and then vomiting after.  Pt states cough is dry, pain with coughing.  Pt taking mucinex without relief.

## 2025-01-26 NOTE — ED PROVIDER NOTES
Patient Seen in: [facility]      History     Chief Complaint   Patient presents with    Cough/URI     Stated Complaint: cold like symptoms    Subjective:   64-year-old female presents today with URI symptoms low-grade fever and persistent cough for about 4 days.  Does have a history of sarcoidosis.  Does report having posttussive emesis.  Is currently on a Medrol Dosepak that she was placed on by her primary care physician for her cough.  Denies any shortness of breath but does feel like she has been wheezing.  No other symptoms or concerns.  The patient's medication list, past medical history and social history elements as listed in today's nurse's notes were reviewed and agreed (except as otherwise stated in the HPI).  The patient's family history reviewed and determined to be noncontributory to the presenting problem              Objective:     Past Medical History:    Allergic rhinitis    COVID    S/S: cough, cold, aches, pneumonia - no hospitalization     Diverticulosis    Seen on screening colonoscopy    DM2 (diabetes mellitus, type 2) (HCC)    Essential hypertension    Fatty liver    Incidental finding on CT chest 2020 at [facility]    GERD (gastroesophageal reflux disease)    History of blood transfusion    during      Hyperlipidemia    Hypertension    Hypothyroid    Osteoarthritis    UPPER BACK     Sarcoid    Visual impairment    GLASSES              Past Surgical History:   Procedure Laterality Date     delivery only      Cholecystectomy      Colonoscopy  2020    Dr. Foster [facility]    Hernia surgery  2022    ventral hernia repair    Hysterectomy      Due to DUB    [facility],biopsy      To diagnose Sarcoid                Social History     Socioeconomic History    Marital status: Single    Number of children: 1   Occupational History    Occupation: [employer] sales   Tobacco Use    Smoking status: Former     Current packs/day: 0.00     Average  packs/day: 0.5 packs/day for 10.0 years (5.0 ttl pk-yrs)     Types: Cigarettes     Start date:      Quit date:      Years since quittin.0    Smokeless tobacco: Never   Vaping Use    Vaping status: Never Used   Substance and Sexual Activity    Alcohol use: Not Currently    Drug use: Never    Sexual activity: Not Currently     Partners: Male     Birth control/protection: Hysterectomy   Other Topics Concern    Caffeine Concern No    Exercise Yes     Comment: 3x week 1 hr    Seat Belt Yes     Social Drivers of Health      Received from [facility]    Social Connections    Received from [facility], [facility]    Housing Stability              Review of Systems    Positive for stated complaint: cold like symptoms  Other systems are as noted in HPI.  Constitutional and vital signs reviewed.      All other systems reviewed and negative except as noted above.    Physical Exam     ED Triage Vitals [25 0813]   BP    Pulse 81   Resp 18   Temp 100.1 °F (37.8 °C)   Temp src Oral   SpO2 94 %   O2 Device None (Room air)       Current Vitals:   Vital Signs  Pulse: 81  Resp: 18  Temp: 100.1 °F (37.8 °C)  Temp src: Oral    Oxygen Therapy  SpO2: 94 %  O2 Device: None (Room air)        Physical Exam  Vitals and nursing note reviewed.   Constitutional:       Appearance: She is well-developed.   HENT:      Head: Normocephalic.      Right Ear: Tympanic membrane and ear canal normal.      Left Ear: Tympanic membrane and ear canal normal.      Nose: Mucosal edema and rhinorrhea present.      Mouth/Throat:      Pharynx: Uvula midline. Posterior oropharyngeal erythema present.   Eyes:      Conjunctiva/sclera: Conjunctivae normal.      Pupils: Pupils are equal, round, and reactive to light.   Cardiovascular:      Rate and Rhythm: Normal rate and regular rhythm.   Pulmonary:      Effort: Pulmonary effort is normal.      Breath sounds:  Normal breath sounds.      Comments: Persistent dry cough with deep inspiration.  Musculoskeletal:      Cervical back: Normal range of motion and neck supple.   Skin:     General: Skin is warm and dry.   Neurological:      Mental Status: She is alert and oriented to person, place, and time.             ED Course     Labs Reviewed   RAPID SARS-COV-2 BY PCR - Normal              XR CHEST PA + LAT CHEST (CPT=71046)    Result Date: 1/26/2025  PROCEDURE:  XR CHEST PA + LAT CHEST (CPT=71046)  INDICATIONS:  cold like symptoms  COMPARISON:  [facility] , XR, XR CHEST AP PORTABLE  (CPT=71045), 8/26/2024, 10:50 AM.  TECHNIQUE:  PA and lateral chest radiographs were obtained.  PATIENT STATED HISTORY: (As transcribed by Technologist)  Patient states she has had cough for the past 5 days. Coughs so hard she vomits.    FINDINGS:  Cardiac silhouette and pulmonary vasculature within normal limits. No focal consolidation, pneumothorax or pleural effusion.            CONCLUSION:  No focal consolidation.   LOCATION:  [facility]   Dictated by (CST): Kay Melara MD on 1/26/2025 at 8:53 AM     Finalized by (CST): Kay Melara MD on 1/26/2025 at 8:54 AM          MDM     Please note that this report has been produced using speech recognition software and may contain errors related to that system including, but not limited to, errors in grammar, punctuation, and spelling, as well as words and phrases that possibly may have been recognized inappropriately.  If there are any questions or concerns, contact the dictating provider for clarification.              Medical Decision Making  Differential diagnosis includes but is not limited to: COVID-19, viral URI, strep throat, influenza, pneumonia, sinusitis, bronchitis      Presented today with low-grade fever persistent cough for 4 to 5 days.  COVID-19 testing was done and negative.  Chest x-ray showed no consolidation acute findings.  Patient does have persistent dry bronchial cough.  Will give  prescription for albuterol inhaler as well as Tessalon Perles.  Patient is currently on Medrol Dosepak.  Encouraged to take over-the-counter antihistamine.  Will also give prescription for Zofran to take as needed.  Follow with primary care physician in 1 week if symptoms do not improve.  Go directly to the ER with any labored breathing uncontrolled vomiting or any worsening of symptoms.  Patient verbalized understanding and agreed to plan of care.    Amount and/or Complexity of Data Reviewed  Labs: ordered. Decision-making details documented in ED Course.     Details: COVID-19  Radiology: ordered and independent interpretation performed. Decision-making details documented in ED Course.     Details: Chest x-ray    Risk  OTC drugs.  Prescription drug management.        Disposition and Plan     Clinical Impression:  1. Viral URI with cough         Disposition:  Discharge  1/26/2025  9:05 am    Follow-up:  Lino Nieto MD  [address] [facility] [address]  [phone]    In 1 week  As needed          Medications Prescribed:  Current Discharge Medication List        START taking these medications    Details   albuterol 108 (90 Base) MCG/ACT Inhalation Aero Soln Inhale 2 puffs into the lungs every 4 (four) hours as needed for Wheezing.  Qty: 1 each, Refills: 0      ondansetron 4 MG Oral Tablet Dispersible Take 1 tablet (4 mg total) by mouth every 4 (four) hours as needed for Nausea.  Qty: 10 tablet, Refills: 0      benzonatate 100 MG Oral Cap Take 1 capsule (100 mg total) by mouth 3 (three) times daily as needed for cough.  Qty: 30 capsule, Refills: 0                 Supplementary Documentation:

## 2025-01-28 ENCOUNTER — VIRTUAL PHONE E/M (OUTPATIENT)
Dept: FAMILY MEDICINE CLINIC | Facility: CLINIC | Age: 65
End: 2025-01-28
Payer: COMMERCIAL

## 2025-01-28 DIAGNOSIS — R05.8 PRODUCTIVE COUGH: Primary | ICD-10-CM

## 2025-01-28 PROCEDURE — 98006 SYNCH AUDIO-VIDEO EST MOD 30: CPT | Performed by: FAMILY MEDICINE

## 2025-01-28 RX ORDER — AZITHROMYCIN 250 MG/1
TABLET, FILM COATED ORAL
Qty: 6 TABLET | Refills: 0 | Status: SHIPPED | OUTPATIENT
Start: 2025-01-28 | End: 2025-02-02

## 2025-01-28 RX ORDER — CODEINE PHOSPHATE AND GUAIFENESIN 10; 100 MG/5ML; MG/5ML
5 SOLUTION ORAL NIGHTLY
Qty: 100 ML | Refills: 0 | Status: CANCELLED
Start: 2025-01-28

## 2025-01-28 RX ORDER — CODEINE PHOSPHATE AND GUAIFENESIN 10; 100 MG/5ML; MG/5ML
5 SOLUTION ORAL NIGHTLY PRN
Qty: 100 ML | Refills: 0 | Status: SHIPPED | OUTPATIENT
Start: 2025-01-28

## 2025-01-28 NOTE — PROGRESS NOTES
My Chart/ Video/Telephone Visit Check-In    Madison Muñoz and/or caregiver verbally consents a video Check-In service on 01/28/25.[employer]  Patient understands and accepts financial responsibility for any deductible, co-insurance and/or co-pays associated with this service.    Duration of the service including reviewing the chart, engaging with the patient and giving medical guidance: 12 minutes    Madison Muñoz is a 64 year old female.    CC:  Sick    HPI:  Productive cough for one week. She is brining up thick, green, tenacious mucous. She notes chest congestion. Only SOB with coughing. O2 sat running 95+% at home. Cough worse when supine. Has a hx of sarcoid. I did rx oral steroid last week that is not helping. Seen in IC last week. CXR negative. Given rx for Zofran, Tessalon and Albuterol, non of which has helped.     Allergies as of 01/28/2025 - Reviewed 01/26/2025   Allergen Reaction Noted    Amoxicillin-pot clavulanate NAUSEA ONLY and RASH 05/08/2012    Nitroglycerin HYPOTENSION and OTHER (SEE COMMENTS) 11/15/2008    Tramadol RASH and NAUSEA ONLY 07/03/2006    Lipitor [atorvastatin] MYALGIA 07/24/2024    Ozempic (0.25 or 0.5 mg-dose) [semaglutide] NAUSEA AND VOMITING 05/18/2023    Diazepam OTHER (SEE COMMENTS) 09/05/2017        Current Meds:  Current Outpatient Medications   Medication Sig Dispense Refill    albuterol 108 (90 Base) MCG/ACT Inhalation Aero Soln Inhale 2 puffs into the lungs every 4 (four) hours as needed for Wheezing. 1 each 0    ondansetron 4 MG Oral Tablet Dispersible Take 1 tablet (4 mg total) by mouth every 4 (four) hours as needed for Nausea. 10 tablet 0    benzonatate 100 MG Oral Cap Take 1 capsule (100 mg total) by mouth 3 (three) times daily as needed for cough. 30 capsule 0    predniSONE 20 MG Oral Tab 3 qd x 3 days, then 2 qd x 3 days, then 1 qd x 3 days with food. 18 tablet 0    metFORMIN  MG Oral Tablet 24 Hr TAKE TWO TABLETS BY MOUTH TWICE DAILY WITH MEALS 360 tablet  1    hydroCHLOROthiazide 12.5 MG Oral Tab Take 1 tablet (12.5 mg total) by mouth daily. 90 tablet 1    CITALOPRAM 40 MG Oral Tab Take 1 tablet (40 mg total) by mouth daily. 90 tablet 2    LOSARTAN 25 MG Oral Tab Take 1 tablet (25 mg total) by mouth daily. 90 tablet 2    levothyroxine 88 MCG Oral Tab Take 1 tablet (88 mcg total) by mouth daily. 90 tablet 2    omeprazole 20 MG Oral Capsule Delayed Release Take 1 capsule (20 mg total) by mouth daily. 90 capsule 2    Glucose Blood (CONTOUR NEXT TEST) In Vitro Strip Use twice daily to check blood sugar 200 strip 3    clobetasol 0.05 % External Ointment Apply 1 Application topically at bedtime. Apply a thin layer to affected area at HS nightly for 12 weeks 30 g 0    nystatin 100,000 Units/g External Cream Apply TOPICALLY to affected area twice per day 30 g 2    cetirizine 10 MG Oral Tab Take 1 tablet (10 mg total) by mouth daily.      Magnesium-Potassium 250-100 MG Oral Tab Take 1 tablet by mouth daily.          History:  Past Medical History:    Allergic rhinitis    COVID    S/S: cough, cold, aches, pneumonia - no hospitalization     Diverticulosis    Seen on screening colonoscopy    DM2 (diabetes mellitus, type 2) (HCC)    Essential hypertension    Fatty liver    Incidental finding on CT chest 2020 at [facility]    GERD (gastroesophageal reflux disease)    History of blood transfusion    during      Hyperlipidemia    Hypertension    Hypothyroid    Osteoarthritis    UPPER BACK     Sarcoid    Visual impairment    GLASSES      Past Surgical History:   Procedure Laterality Date     delivery only      Cholecystectomy      Colonoscopy  2020    Dr. Foster Satnam    Hernia surgery  2022    ventral hernia repair    Hysterectomy      Due to [facility],biopsy  2015    To diagnose Sarcoid      Family History   Problem Relation Age of Onset    Kidney Disease Mother     Hypertension Mother     Other (CHF) Mother     Other (Aortic  aneurysm) Father     Heart Disease Brother     Other (Leukemia) Brother 72    No Known Problems Brother     No Known Problems Son       Family Status   Relation Status    Mo     Fa     Bro Alive    Bro     Bro Alive    Son Alive, age 41y      Social History     Socioeconomic History    Marital status: Single    Number of children: 1   Occupational History    Occupation: [employer]   Tobacco Use    Smoking status: Former     Current packs/day: 0.00     Average packs/day: 0.5 packs/day for 10.0 years (5.0 ttl pk-yrs)     Types: Cigarettes     Start date:      Quit date:      Years since quittin.0    Smokeless tobacco: Never   Vaping Use    Vaping status: Never Used   Substance and Sexual Activity    Alcohol use: Not Currently    Drug use: Never    Sexual activity: Not Currently     Partners: Male     Birth control/protection: Hysterectomy   Other Topics Concern    Caffeine Concern No    Exercise Yes     Comment: 3x week 1 hr    Seat Belt Yes     Social Drivers of Health      [facility]    Social Connections    Received [facility]    Housing Stability        ROS:  General: lower energy as she is not sleeping well       Physical Exam:  General: No apparent distress when conversing with me  Psych: Alert and oriented x 3, speech was not pressured  Resp: No respiratory distress noted when conversing with me, able to speak in full sentences. Productive cough noted throughout the interview.     ASSESSMENT AND PLAN    1. Productive cough  Take prescribed medications as directed.   Rest and push fluids  She will borrow her brother's nebulizer and neb distilled water a few times per day to help break up the mucous.     - guaiFENesin-codeine 100-10 MG/5ML Oral Solution; Take 5 mL by mouth nightly as needed for cough.  Dispense: 100 mL; Refill: 0      No follow-ups  on file.    Orders for this visit:    No orders of the defined types were placed in this encounter.      None    Meds & Refills for this Visit:  Requested Prescriptions     Signed Prescriptions Disp Refills    azithromycin (ZITHROMAX Z-ISABELLA) 250 MG Oral Tab 6 tablet 0     Sig: Take 2 tablets (500 mg total) by mouth daily for 1 day, THEN 1 tablet (250 mg total) daily for 4 days.    guaiFENesin-codeine 100-10 MG/5ML Oral Solution 100 mL 0     Sig: Take 5 mL by mouth nightly as needed for cough.             Authorized by Lino Nieto M.D.         Please note that the following visit was completed using two-way, real-time interactive audio and/or video communication.  This has been done in good caitlin to provide continuity of care in the best interest of the provider-patient relationship, due to the ongoing public health crisis/national emergency and because of restrictions of visitation.  There are limitations of this visit as no physical exam could be performed.  Every conscious effort was taken to allow for sufficient and adequate time.  This billing was spent on reviewing labs, medications, radiology tests and decision making.  Appropriate medical decision-making and tests are ordered as detailed in the plan of care above.”

## 2025-02-12 ENCOUNTER — OFFICE VISIT (OUTPATIENT)
Dept: FAMILY MEDICINE CLINIC | Facility: CLINIC | Age: 65
End: 2025-02-12
Payer: COMMERCIAL

## 2025-02-12 VITALS
TEMPERATURE: 98 F | WEIGHT: 237.19 LBS | OXYGEN SATURATION: 96 % | DIASTOLIC BLOOD PRESSURE: 72 MMHG | SYSTOLIC BLOOD PRESSURE: 138 MMHG | BODY MASS INDEX: 41 KG/M2 | HEART RATE: 88 BPM

## 2025-02-12 DIAGNOSIS — R07.81 RIB PAIN ON RIGHT SIDE: Primary | ICD-10-CM

## 2025-02-12 DIAGNOSIS — E11.9 TYPE 2 DIABETES MELLITUS WITHOUT COMPLICATION, WITHOUT LONG-TERM CURRENT USE OF INSULIN (HCC): ICD-10-CM

## 2025-02-12 DIAGNOSIS — E78.2 MIXED HYPERLIPIDEMIA: ICD-10-CM

## 2025-02-12 DIAGNOSIS — E03.9 HYPOTHYROIDISM, UNSPECIFIED TYPE: ICD-10-CM

## 2025-02-12 LAB
ANION GAP SERPL CALC-SCNC: 7 MMOL/L (ref 0–18)
BUN BLD-MCNC: 15 MG/DL (ref 9–23)
CALCIUM BLD-MCNC: 9.6 MG/DL (ref 8.7–10.6)
CHLORIDE SERPL-SCNC: 103 MMOL/L (ref 98–112)
CHOLEST SERPL-MCNC: 238 MG/DL (ref ?–200)
CO2 SERPL-SCNC: 26 MMOL/L (ref 21–32)
CREAT BLD-MCNC: 0.68 MG/DL
CREAT UR-SCNC: 123.2 MG/DL
EGFRCR SERPLBLD CKD-EPI 2021: 98 ML/MIN/1.73M2 (ref 60–?)
EST. AVERAGE GLUCOSE BLD GHB EST-MCNC: 249 MG/DL (ref 68–126)
FASTING PATIENT LIPID ANSWER: YES
FASTING STATUS PATIENT QL REPORTED: YES
GLUCOSE BLD-MCNC: 263 MG/DL (ref 70–99)
HBA1C MFR BLD: 10.3 % (ref ?–5.7)
HDLC SERPL-MCNC: 49 MG/DL (ref 40–59)
LDLC SERPL CALC-MCNC: 136 MG/DL (ref ?–100)
MICROALBUMIN UR-MCNC: 1.2 MG/DL
MICROALBUMIN/CREAT 24H UR-RTO: 9.7 UG/MG (ref ?–30)
NONHDLC SERPL-MCNC: 189 MG/DL (ref ?–130)
OSMOLALITY SERPL CALC.SUM OF ELEC: 292 MOSM/KG (ref 275–295)
POTASSIUM SERPL-SCNC: 4 MMOL/L (ref 3.5–5.1)
SODIUM SERPL-SCNC: 136 MMOL/L (ref 136–145)
T4 FREE SERPL-MCNC: 1.4 NG/DL (ref 0.8–1.7)
TRIGL SERPL-MCNC: 297 MG/DL (ref 30–149)
TSI SER-ACNC: 1.02 UIU/ML (ref 0.55–4.78)
VLDLC SERPL CALC-MCNC: 55 MG/DL (ref 0–30)

## 2025-02-12 PROCEDURE — 84443 ASSAY THYROID STIM HORMONE: CPT | Performed by: FAMILY MEDICINE

## 2025-02-12 PROCEDURE — 80061 LIPID PANEL: CPT | Performed by: FAMILY MEDICINE

## 2025-02-12 PROCEDURE — 82043 UR ALBUMIN QUANTITATIVE: CPT | Performed by: FAMILY MEDICINE

## 2025-02-12 PROCEDURE — 80048 BASIC METABOLIC PNL TOTAL CA: CPT | Performed by: FAMILY MEDICINE

## 2025-02-12 PROCEDURE — 99214 OFFICE O/P EST MOD 30 MIN: CPT | Performed by: FAMILY MEDICINE

## 2025-02-12 PROCEDURE — 84439 ASSAY OF FREE THYROXINE: CPT | Performed by: FAMILY MEDICINE

## 2025-02-12 PROCEDURE — 83036 HEMOGLOBIN GLYCOSYLATED A1C: CPT | Performed by: FAMILY MEDICINE

## 2025-02-12 PROCEDURE — 82570 ASSAY OF URINE CREATININE: CPT | Performed by: FAMILY MEDICINE

## 2025-02-12 NOTE — PROGRESS NOTES
Madison Muñoz is a 64 year old female.    CC:    Chief Complaint   Patient presents with    Follow - Up       HPI:  Here to follow up diabetes  Home sugar readings: none  Insulin use: none  Diet compliance: poor the past 6 weeks due to job and family stress  Exercise: none  Last eye exam: 3/2024  Extremity pain/numbness: none  Chest pain: none  Would like lipids and thyroid checked as well. Taking thyroid med as directed.     Lab Results   Component Value Date     (H) 07/24/2024    A1C 6.6 (H) 07/24/2024         Allergies as of 02/12/2025 - Review Complete 01/28/2025   Allergen Reaction Noted    Amoxicillin-pot clavulanate NAUSEA ONLY and RASH 05/08/2012    Nitroglycerin HYPOTENSION and OTHER (SEE COMMENTS) 11/15/2008    Tramadol RASH and NAUSEA ONLY 07/03/2006    Lipitor [atorvastatin] MYALGIA 07/24/2024    Ozempic (0.25 or 0.5 mg-dose) [semaglutide] NAUSEA AND VOMITING 05/18/2023    Diazepam OTHER (SEE COMMENTS) 09/05/2017        Current Meds:  Current Outpatient Medications   Medication Sig Dispense Refill    guaiFENesin-codeine 100-10 MG/5ML Oral Solution Take 5 mL by mouth nightly as needed for cough. 100 mL 0    albuterol 108 (90 Base) MCG/ACT Inhalation Aero Soln Inhale 2 puffs into the lungs every 4 (four) hours as needed for Wheezing. 1 each 0    benzonatate 100 MG Oral Cap Take 1 capsule (100 mg total) by mouth 3 (three) times daily as needed for cough. 30 capsule 0    metFORMIN  MG Oral Tablet 24 Hr TAKE TWO TABLETS BY MOUTH TWICE DAILY WITH MEALS 360 tablet 1    hydroCHLOROthiazide 12.5 MG Oral Tab Take 1 tablet (12.5 mg total) by mouth daily. 90 tablet 1    CITALOPRAM 40 MG Oral Tab Take 1 tablet (40 mg total) by mouth daily. 90 tablet 2    LOSARTAN 25 MG Oral Tab Take 1 tablet (25 mg total) by mouth daily. 90 tablet 2    levothyroxine 88 MCG Oral Tab Take 1 tablet (88 mcg total) by mouth daily. 90 tablet 2    omeprazole 20 MG Oral Capsule Delayed Release Take 1 capsule (20 mg total)  by mouth daily. 90 capsule 2    Glucose Blood (CONTOUR NEXT TEST) In Vitro Strip Use twice daily to check blood sugar 200 strip 3    clobetasol 0.05 % External Ointment Apply 1 Application topically at bedtime. Apply a thin layer to affected area at HS nightly for 12 weeks 30 g 0    nystatin 100,000 Units/g External Cream Apply TOPICALLY to affected area twice per day 30 g 2    cetirizine 10 MG Oral Tab Take 1 tablet (10 mg total) by mouth daily.      Magnesium-Potassium 250-100 MG Oral Tab Take 1 tablet by mouth daily.          History:  Past Medical History:    Allergic rhinitis    COVID    S/S: cough, cold, aches, pneumonia - no hospitalization     Diverticulosis    Seen on screening colonoscopy    DM2 (diabetes mellitus, type 2) (HCC)    Essential hypertension    Fatty liver    Incidental finding on CT chest 2020 at [facility]    GERD (gastroesophageal reflux disease)    History of blood transfusion    during      Hyperlipidemia    Hypertension    Hypothyroid    Osteoarthritis    UPPER BACK     Sarcoid    Visual impairment    GLASSES      Past Surgical History:   Procedure Laterality Date     delivery only      Cholecystectomy      Colonoscopy  2020    Dr. Foster [facility]    Hernia surgery  2022    ventral hernia repair    Hysterectomy      Due to [facility],biopsy  2015    To diagnose Sarcoid      Family History   Problem Relation Age of Onset    Kidney Disease Mother     Hypertension Mother     Other (CHF) Mother     Other (Aortic aneurysm) Father     Heart Disease Brother     Other (Leukemia) Brother 72    No Known Problems Brother     No Known Problems Son       Family Status   Relation Status    Mo     Fa     Bro Alive    Bro     Bro Alive    Son Alive, age 41y      Social History     Socioeconomic History    Marital status: Single    Number of children: 1   Occupational History    Occupation: [employer]   Tobacco Use    Smoking  status: Former     Current packs/day: 0.00     Average packs/day: 0.5 packs/day for 10.0 years (5.0 ttl pk-yrs)     Types: Cigarettes     Start date:      Quit date:      Years since quittin.1    Smokeless tobacco: Never   Vaping Use    Vaping status: Never Used   Substance and Sexual Activity    Alcohol use: Not Currently    Drug use: Never    Sexual activity: Not Currently     Partners: Male     Birth control/protection: Hysterectomy   Other Topics Concern    Caffeine Concern No    Exercise Yes     Comment: 3x week 1 hr    Seat Belt Yes     Social Drivers of Health      Received [facility]    Housing Stability        ROS:  General: + weight gain due to por diet  Respiratory: Cough from last month's illness is improving. She has a pain in the R lateral ribs with cough that can be intense    Vitals: /72   Pulse 88   Temp 97.6 °F (36.4 °C) (Temporal)   Wt 237 lb 3.2 oz (107.6 kg)   SpO2 96%   BMI 40.72 kg/m²    Reviewed by DAKOTA Nieto M.D.    Physical Exam:  GEN: well developed, well nourished, in no apparent distress  EYE: B conjunctiva and lids normal  HENT: No oral lesions.   NECK: No lymphadenopathy, thyromegaly or masses  CAR: S1, S2 normal, RRR; no S3, no S4; no click; murmur negative  PULM: clear to auscultation B, no accessory muscle use  GI: not examined  PSYCH: alert and oriented x 3; affect appropriate  SKIN: not examined  EXTREMITIES: No clubbing, cyanosis or edema  GENITAL: not examined  LYMPH: no supraclavicular nodes  MS: R lower, lateral rib tenderness, no click or paradoxical motion noted  NEURO: Awake and alert. Normal speech and articulation. No facial droop or asymmetry. Moving all extremities equally.  Bilateral barefoot skin diabetic exam is normal, visualized feet and the appearance is normal.  Bilateral monofilament/sensation of both feet is normal.  Pulsation pedal pulse exam of both lower legs/feet is normal as  well.    ASSESSMENT AND PLAN    1. Type 2 diabetes mellitus without complication, without long-term current use of insulin (HCC)  Await results   Consider Mounjaro. She has not tolerated Ozempic, or Trulicity  - Basic Metabolic Panel (8)  - Hemoglobin A1C  - Microalb/Creat Ratio, Random Urine    2. Mixed hyperlipidemia  Await results   - Lipid Panel; Future  - Lipid Panel    3. Hypothyroidism, unspecified type  Await results   - TSH and Free T4; Future  - TSH and Free T4    4. Rib pain on right side  Await results   - XR RIBS, UNILATERAL (2 VIEWS), RIGHT (CPT=71100); Future      No follow-ups on file.    Orders for this visit:    Orders Placed This Encounter   Procedures    Basic Metabolic Panel (8)     Standing Status:   Future     Number of Occurrences:   1     Standing Expiration Date:   2/12/2026    TSH and Free T4     Standing Status:   Future     Number of Occurrences:   1     Standing Expiration Date:   2/12/2026    Hemoglobin A1C     Standing Status:   Future     Number of Occurrences:   1     Standing Expiration Date:   2/12/2026    Microalb/Creat Ratio, Random Urine     Standing Status:   Future     Number of Occurrences:   1     Standing Expiration Date:   2/12/2026    Lipid Panel     Standing Status:   Future     Number of Occurrences:   1     Standing Expiration Date:   2/12/2026    VENIPUNCTURE     Order Specific Question:   Release to patient     Answer:   Immediate       XR RIBS, UNILATERAL (2 VIEWS), RIGHT (CPT=71100)    Meds & Refills for this Visit:  Requested Prescriptions      No prescriptions requested or ordered in this encounter             Authorized by Lino Nieto M.D.

## 2025-02-13 ENCOUNTER — HOSPITAL ENCOUNTER (OUTPATIENT)
Dept: GENERAL RADIOLOGY | Age: 65
Discharge: HOME | End: 2025-02-13
Attending: FAMILY MEDICINE
Payer: COMMERCIAL

## 2025-02-13 ENCOUNTER — TELEPHONE (OUTPATIENT)
Dept: FAMILY MEDICINE CLINIC | Facility: CLINIC | Age: 65
End: 2025-02-13

## 2025-02-13 DIAGNOSIS — E78.2 MIXED HYPERLIPIDEMIA: ICD-10-CM

## 2025-02-13 DIAGNOSIS — R07.81 RIB PAIN ON RIGHT SIDE: ICD-10-CM

## 2025-02-13 DIAGNOSIS — E11.9 TYPE 2 DIABETES MELLITUS WITHOUT COMPLICATION, WITHOUT LONG-TERM CURRENT USE OF INSULIN (HCC): Primary | ICD-10-CM

## 2025-02-13 DIAGNOSIS — I10 HYPERTENSION, UNSPECIFIED TYPE: ICD-10-CM

## 2025-02-13 PROCEDURE — 71100 X-RAY EXAM RIBS UNI 2 VIEWS: CPT | Performed by: FAMILY MEDICINE

## 2025-02-13 RX ORDER — TIRZEPATIDE 2.5 MG/.5ML
2.5 INJECTION, SOLUTION SUBCUTANEOUS WEEKLY
Qty: 2 ML | Refills: 0 | Status: SHIPPED | OUTPATIENT
Start: 2025-02-13

## 2025-02-13 RX ORDER — ROSUVASTATIN CALCIUM 5 MG/1
5 TABLET, COATED ORAL NIGHTLY
Qty: 90 TABLET | Refills: 0 | Status: SHIPPED | OUTPATIENT
Start: 2025-02-13

## 2025-02-13 RX ORDER — TIRZEPATIDE 2.5 MG/.5ML
2.5 INJECTION, SOLUTION SUBCUTANEOUS WEEKLY
Qty: 2 ML | Refills: 0 | Status: SHIPPED | OUTPATIENT
Start: 2025-02-13 | End: 2025-02-13

## 2025-02-13 NOTE — TELEPHONE ENCOUNTER
Advised patient of Dr. Nieto's note below. Patient verbalized understanding.   Patient asking to reschedule XR for tomorrow, call transferred to . No further questions at this time.

## 2025-02-13 NOTE — TELEPHONE ENCOUNTER
PATIENT CALLING TO INFORM SHE NEEDS NEW MEDICATIONS SENT TO [id number] - [place], [place] - [address] [place] [phone], [phone].       PATIENT STATES SHE RECEIVED MESSAGE VIA MY CHART REGARDING NEW MEDICATION. PATIENT WAS NOT ABLE TO RESPOND SO CALLED THE OFFICE.

## 2025-02-13 NOTE — TELEPHONE ENCOUNTER
Advised patient of Dr. Nieto's note below. Patient verbalized understanding and is agreeable to both Rx mounjaro (in addition to metformin) and crestor. No further questions at this time    Please send to [facility]. Thank you

## 2025-02-13 NOTE — TELEPHONE ENCOUNTER
Please see note below - please send to [facility]      Reviewed patient's chart:  Please refer to result notes:  Hi Madison,  Your  electrolyte, liver (AST, ALT), kidney (GFR), and thyroid (TSH) tests were fine.     The diabetes has become uncontrolled with the A1c bouncing to 10.3. I would really like to try you on the Mounjaro injections. This would be in addition to the Metformin. Let me know if you are still ok with this and which pharmacy to use.     You are not spilling micro protein in the urine which indicates no microscopic damage to the kidneys is occurring from the diabetes.     The lipids are also elevated. I strongly recommend we start a lipid lowering medication such as  Crestor to bring these down. I know we may have had some muscle aches with Lipitor in the past. The Crestor is in the same category as Lipitor. We would need to watch for similar symptoms. Please let me know if you are amenable to the Crestor and which pharmacy to use.     Regards,  Doc Sriram   Written by Lino Nieto MD on 2/13/2025  7:00 AM CST  Seen by patient Madison YANI Muñoz on 2/13/2025  8:15 AM

## 2025-02-13 NOTE — TELEPHONE ENCOUNTER
So, I recommended she try Mounjaro injections for the diabetes and Crestor for the lipids. Is she ok starting both? She did have some muscle aches with Lipitor in the past that I hope do not occur with the Crestor.    Thanks

## 2025-02-13 NOTE — TELEPHONE ENCOUNTER
Please let patient or caregiver know or leave message that:  Both meds sent. There may be an approval process for the Mounjaro. I will need to see her back a day or so after the 4th injection of the Mounjaro.    Thanks

## 2025-02-24 ENCOUNTER — TELEPHONE (OUTPATIENT)
Dept: FAMILY MEDICINE CLINIC | Facility: CLINIC | Age: 65
End: 2025-02-24

## 2025-02-24 PROBLEM — L30.4 INTERTRIGO: Status: RESOLVED | Noted: 2021-07-15 | Resolved: 2025-02-24

## 2025-02-24 PROBLEM — L28.0 LICHEN OF SKIN: Status: RESOLVED | Noted: 2023-09-05 | Resolved: 2025-02-24

## 2025-02-24 RX ORDER — OMEPRAZOLE 20 MG/1
20 CAPSULE, DELAYED RELEASE ORAL DAILY
Qty: 90 CAPSULE | Refills: 3 | Status: SHIPPED | OUTPATIENT
Start: 2025-02-24

## 2025-02-24 NOTE — TELEPHONE ENCOUNTER
Patient reports has taken 2nd dose of Rx Mounjaro on Thursday (2.5 mg dose)  Patient c/o experiencing N/V/D - patient reports most miserable weekend  Has been taking left over zofran  Advised patient to continue to hydrate, Gatorade/Pedialyte - patient verbalized understanding.    Patient reports would like to discontinue medication    Advised patient will notify Dr. Nieto, he's not in office today, but will be back in office tomorrow - she verbalized understanding.    Please advise, thank you

## 2025-02-24 NOTE — TELEPHONE ENCOUNTER
Marcelina. We were hoping the Mounjaro would be better tolerated. Please have her stop it. Her diabetes was not well controlled recently. I feel that we need to add something to the Metformin she already takes. How about Invokana or similar medication. These medication work by releasing excess sugar into the urinary tract.  which is then dumped out of the body with urination. The biggest side effect would be increased risk of getting a UTI. Let me know if she wants to try it.    Thanks

## 2025-02-24 NOTE — TELEPHONE ENCOUNTER
Patient states she cannot take Wegovy I don't see this in Rx list but verified name of med twice with patient)    She has been sick since starting it    Nausea  Diarrhea  Vomiting    Please adv  Thank you

## 2025-02-24 NOTE — TELEPHONE ENCOUNTER
Please let patient or caregiver know or leave message that:  Rx sent. Please have her f/u with me in about 10 weeks  Thanks

## 2025-02-24 NOTE — TELEPHONE ENCOUNTER
Advised patient of Dr. Nieto's note below. Patient verbalized understanding. No further questions at this time.    Future Appointments   Date Time Provider Department Center   5/6/2025  1:00 PM Lino Nieto MD [facility] [place]

## 2025-02-24 NOTE — TELEPHONE ENCOUNTER
Advised patient of Dr. Nieto's note below. Patient verbalized understanding and agreeable to try Rx Invokana or similar medication. Patient reports she usually gets yeast infections as well and already has nystatin cream.     Please send Rx to [place]. No further questions at this time.    Please advise, thank you

## 2025-03-06 ENCOUNTER — TELEPHONE (OUTPATIENT)
Dept: FAMILY MEDICINE CLINIC | Facility: CLINIC | Age: 65
End: 2025-03-06

## 2025-03-06 NOTE — TELEPHONE ENCOUNTER
Pharmacy calling stating on 02/13 they received two scripts for Tirzepatide (MOUNJARO) 2.5 MG/0.5ML Subcutaneous Solution Auto-injector.     Asking if provider wants to offer patient 5MG this time or stick with 2.5 MG.     Endorsed to RN.

## 2025-03-10 RX ORDER — TIRZEPATIDE 2.5 MG/.5ML
2.5 INJECTION, SOLUTION SUBCUTANEOUS WEEKLY
Qty: 2 ML | Refills: 0 | OUTPATIENT
Start: 2025-03-10

## 2025-03-13 RX ORDER — LEVOTHYROXINE SODIUM 88 UG/1
88 TABLET ORAL DAILY
Qty: 90 TABLET | Refills: 3 | Status: SHIPPED | OUTPATIENT
Start: 2025-03-13

## 2025-03-24 ENCOUNTER — LAB ENCOUNTER (OUTPATIENT)
Dept: LAB | Age: 65
End: 2025-03-24
Payer: COMMERCIAL

## 2025-03-24 ENCOUNTER — OFFICE VISIT (OUTPATIENT)
Facility: CLINIC | Age: 65
End: 2025-03-24
Payer: COMMERCIAL

## 2025-03-24 VITALS
SYSTOLIC BLOOD PRESSURE: 124 MMHG | BODY MASS INDEX: 38.27 KG/M2 | HEART RATE: 68 BPM | DIASTOLIC BLOOD PRESSURE: 76 MMHG | OXYGEN SATURATION: 99 % | WEIGHT: 224.19 LBS | HEIGHT: 64 IN

## 2025-03-24 DIAGNOSIS — I15.2 HYPERTENSION ASSOCIATED WITH TYPE 2 DIABETES MELLITUS (HCC): ICD-10-CM

## 2025-03-24 DIAGNOSIS — E78.5 HYPERLIPIDEMIA ASSOCIATED WITH TYPE 2 DIABETES MELLITUS (HCC): ICD-10-CM

## 2025-03-24 DIAGNOSIS — E11.65 INADEQUATELY CONTROLLED DIABETES MELLITUS (HCC): Primary | ICD-10-CM

## 2025-03-24 DIAGNOSIS — E66.01 CLASS 2 SEVERE OBESITY DUE TO EXCESS CALORIES WITH SERIOUS COMORBIDITY AND BODY MASS INDEX (BMI) OF 38.0 TO 38.9 IN ADULT (HCC): ICD-10-CM

## 2025-03-24 DIAGNOSIS — E11.59 HYPERTENSION ASSOCIATED WITH TYPE 2 DIABETES MELLITUS (HCC): ICD-10-CM

## 2025-03-24 DIAGNOSIS — E11.65 TYPE 2 DIABETES MELLITUS WITH HYPERGLYCEMIA, WITHOUT LONG-TERM CURRENT USE OF INSULIN (HCC): ICD-10-CM

## 2025-03-24 DIAGNOSIS — E66.812 CLASS 2 SEVERE OBESITY DUE TO EXCESS CALORIES WITH SERIOUS COMORBIDITY AND BODY MASS INDEX (BMI) OF 38.0 TO 38.9 IN ADULT (HCC): ICD-10-CM

## 2025-03-24 DIAGNOSIS — E06.3 HYPOTHYROIDISM DUE TO HASHIMOTO THYROIDITIS: ICD-10-CM

## 2025-03-24 DIAGNOSIS — E11.65 TYPE 2 DIABETES MELLITUS WITH HYPERGLYCEMIA, WITHOUT LONG-TERM CURRENT USE OF INSULIN (HCC): Primary | ICD-10-CM

## 2025-03-24 DIAGNOSIS — E11.69 HYPERLIPIDEMIA ASSOCIATED WITH TYPE 2 DIABETES MELLITUS (HCC): ICD-10-CM

## 2025-03-24 LAB
GLUCOSE BLOOD: 204
TEST STRIP LOT #: NORMAL NUMERIC

## 2025-03-24 PROCEDURE — 86341 ISLET CELL ANTIBODY: CPT

## 2025-03-24 PROCEDURE — 36415 COLL VENOUS BLD VENIPUNCTURE: CPT

## 2025-03-24 RX ORDER — ACYCLOVIR 400 MG/1
1 TABLET ORAL
Qty: 9 EACH | Refills: 1 | Status: SHIPPED | OUTPATIENT
Start: 2025-03-24

## 2025-03-24 RX ORDER — PEN NEEDLE, DIABETIC 32GX 5/32"
1 NEEDLE, DISPOSABLE MISCELLANEOUS DAILY
Qty: 90 EACH | Refills: 2 | Status: SHIPPED | OUTPATIENT
Start: 2025-03-24 | End: 2026-03-24

## 2025-03-24 RX ORDER — INSULIN DEGLUDEC 100 U/ML
10 INJECTION, SOLUTION SUBCUTANEOUS NIGHTLY
Qty: 15 ML | Refills: 1 | Status: SHIPPED | OUTPATIENT
Start: 2025-03-24

## 2025-03-24 NOTE — PATIENT INSTRUCTIONS
Return Visit:  With NNEKA Cook: Return in about 6 weeks (around 5/5/2025) for diabetes follow up.      Start Taking               insulin degludec (TRESIBA FLEXTOUCH) 100 UNIT/ML Subcutaneous Solution Pen-injector Inject 10 Units into the skin nightly.    Insulin Pen Needle (BD PEN NEEDLE ADEEL U/F) 32G X 4 MM Does not apply Misc Inject 1 Pen into the skin daily.    Continuous Glucose Sensor (DEXCOM G7 SENSOR) Does not apply Misc 1 each Every 10 days.          Stop Taking                empagliflozin (JARDIANCE) 25 MG Oral Tab    Take 1 tablet (25 mg total) by mouth daily.              Updated diabetes medication instructions from today:   Diabetic Medications               insulin degludec (TRESIBA FLEXTOUCH) 100 UNIT/ML Subcutaneous Solution Pen-injector (Taking) Inject 10 Units into the skin nightly.    metFORMIN  MG Oral Tablet 24 Hr (Taking) TAKE TWO TABLETS BY MOUTH TWICE DAILY WITH MEALS            Lab Results   Component Value Date    A1C 10.3 (H) 02/12/2025    A1C 6.6 (H) 07/24/2024    A1C 6.3 (H) 12/08/2023    A1C 6.1 (H) 11/21/2023    A1C 6.3 (H) 05/18/2023        General follow up information:  Please let us know if you require any refills at least 1 week prior to your medication running out. If you do run out of medication, please call our office ASAP to request refills (do not wait until your follow up).   Please call our office if sugars at home are consistently greater than 250 or less than 70 for medication adjustment (do not wait until your follow up appointment).  Lab results and imaging will typically be reviewed at follow up appointments, or within 3-5 business days of ALL results being in if you do not have an appointment scheduled in the near future. Our office will contact you for any abnormal results requiring more urgent follow up or action.   The on-call pager is for urgent matters only. If you are a type 1 diabetic and run out of insulin after business hours 8AM-4PM, you may  call the on-call pager for a refill to a 24 hour pharmacy.  If you have adrenal insufficiency and run out of steroids, you may call the on-call pager for a refill to a 24 hour pharmacy. All other refill requests should be requested during business hours.    Office phone number: [phone]; phones are open Monday-Friday 8:30-4:30.       HOW TO TREAT LOW BLOOD SUGAR (Hypoglycemia)  Low blood sugar= Less than 70, or if you start to have symptoms (below)  Symptoms: Shaking or trembling, fast heart rate, extreme hunger, sweating, confusion/difficulty concentrating, dizziness.    How to treat a low blood sugar if you are able to eat/drink: The Rule of 15/15  If you are using continuous glucose monitor that says you are low, but you do not have any symptoms, verify on fingerstick that your blood sugar is actually low before treating.   Eat 15 grams of carbs (see examples below)  Check your blood sugar after 15 minutes. If it’s still below your target range, have another serving.   Repeat these steps until it’s in your target range. Once it’s in range, if you're nervous about your sugar going low again, have a protein source (ie, a spoonful of peanut butter).   If you have a CGM you want to look for how your arrow has changed. If you arrow is pointed up or sideways after 15 min, give your CGM more time OR check with a finger stick. Try not to eat more food until at least 15 min after the first BG check - otherwise you risk having a rebound high.  If you are experiencing symptoms and you are unable to check your blood glucose for any reason, treat the hypoglycemia.  If someone has a low blood sugar and is unconscious: Don’t hesitate to call 911. If someone is unconscious and glucagon is not available or someone does not know how to use it, call 911 immediately.     To treat a low, I recommend you carry with you easy, pre-portioned treatment for low blood sugars that are 15G of carbs:   - Children sized squeeze pouch  applesauce (high fiber + carbs help prevent too high of a spike)  - Small children's sized juicebox- 15g carb --> 4oz juice box  - Glucose tablets from [employer], you can find them near diabetes supplies --> Note, you will need to eat 3-4 tablets to get to 15g of carbs  - Children sized fruit snack pack- look for one with 15 grams of total carbohydrate  - Choice of how to treat your low is important. Complex carbs, or foods that contain fats along with carbs (like chocolate) can slow the absorption of glucose and should NOT be used to treat an emergency low

## 2025-03-24 NOTE — PROGRESS NOTES
[facility]    Name: Madison Muñoz    Date: 3/24/2025    Madison Muñoz is a 64 year old female who presents for evaluation of T2DM management.     Chief complaint: Follow - Up (Dm follow up. Pt states that her PCP started her on Jardiance, states has developed a vaginal rash. Pt states has not been checking blood sugar levels. )       Subjective:   Initial HPI consult in March 2023:  A1C 7.7% at today's OV, random non fasting . Prev pt at [facility], insurance changed  Diagnosed age: 51  Pt denies hx of hospitalization for DM and/or pancreatitis   Family history of DM: no fam hx  Of note, uses nystatin cream daily and blistering in vaginal area d/t chronic yeast infection she's had for months. Is taking Jardiance. Previously trialed Ozempic/Trulicity and stopped because she didn't like that she was nauseated but notes that she was not instructed of how to change diet to better tolerate GLPs (smaller or low fat meals). Goal is weight loss- has meeting w/ [facility] weight management clinic in June     Interim hx:   3/24/2025-w/ Marti EARLY. In person visit. Last A1c value was 10.3% done 2/12/2025.  Last office visit, was taking metformin monotherapy and continued w/ keto diet, opted to return to PCP for DM management. However, A1C worsened to 10.3% on latest draw.  Tried Mounjaro first, had nausea/vomiting. She was restarted on Jardiance late Feb 2025, states \"it's awful\", yeast infxn is back, it was cleared up prior to that.   Used to use Iain 2 sensor in the past.     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 Still some ongoing stress from daughter in law     DM meds at visit:   Diabetic  Medications               insulin degludec (TRESIBA FLEXTOUCH) 100 UNIT/ML Subcutaneous Solution Pen-injector (Taking) Inject 10 Units into the skin nightly.    metFORMIN  MG Oral Tablet 24 Hr (Taking) TAKE TWO TABLETS BY MOUTH TWICE DAILY WITH MEALS            Previously trialed/failed DM meds:   -Acarbose 25mg three times daily  -  Trulicity - intolerant due to N/V  - glimepiride- stopped to start other meds  - jardiance 25mg- stopped d/t recurrent yeast infections  - ozempic - bad GI side effects  - mounjaro- nausea/vomiting (Feb 2025)    History/Other:    Allergies, PMH, SocHx and FHx reviewed and updated as appropriate in Epic on    insulin degludec (TRESIBA FLEXTOUCH) 100 UNIT/ML Subcutaneous Solution Pen-injector Inject 10 Units into the skin nightly. 15 mL 1    Insulin Pen Needle (BD PEN NEEDLE ADEEL U/F) 32G X 4 MM Does not apply Misc Inject 1 Pen into the skin daily. 90 each 2    Continuous Glucose Sensor (DEXCOM G7 SENSOR) Does not apply Misc 1 each Every 10 days. 9 each 1    levothyroxine 88 MCG Oral Tab Take 1 tablet (88 mcg total) by mouth daily. 90 tablet 3    omeprazole 20 MG Oral Capsule Delayed Release Take 1 capsule (20 mg total) by mouth daily. 90 capsule 3    rosuvastatin (CRESTOR) 5 MG Oral Tab Take 1 tablet (5 mg total) by mouth nightly. 90 tablet 0    metFORMIN  MG Oral Tablet 24 Hr TAKE TWO TABLETS BY MOUTH TWICE DAILY WITH MEALS 360 tablet 1    hydroCHLOROthiazide 12.5 MG Oral Tab Take 1 tablet (12.5 mg total) by mouth daily. 90 tablet 1    CITALOPRAM 40 MG Oral Tab Take 1 tablet (40 mg total) by mouth daily. 90 tablet 2    LOSARTAN 25 MG Oral Tab Take 1 tablet (25 mg total) by mouth daily. 90 tablet 2    Glucose Blood (CONTOUR NEXT TEST) In Vitro Strip Use twice daily to check blood sugar 200 strip 3    clobetasol 0.05 % External Ointment Apply 1 Application topically at bedtime. Apply a thin layer to affected area at HS nightly for 12 weeks 30 g 0    cetirizine 10 MG Oral Tab  Take 1 tablet (10 mg total) by mouth daily.      Magnesium-Potassium 250-100 MG Oral Tab Take 1 tablet by mouth daily.       Allergies   Allergen Reactions    Amoxicillin-Pot Clavulanate NAUSEA ONLY and RASH    Nitroglycerin HYPOTENSION and OTHER (SEE COMMENTS)     \"reacts too fast\" - per pt  Syncope in ER      Tramadol RASH and NAUSEA ONLY     Also double vision      Lipitor [Atorvastatin] MYALGIA     Possibly cause her some chest muscle pain. Has Sarcoid which could have also caused similar symptoms    Ozempic (0.25 Or 0.5 Mg-Dose) [Semaglutide] NAUSEA AND VOMITING    Diazepam OTHER (SEE COMMENTS)     Prolonged effect to diazepam 10 (per pt)  Loss of memory, severe sedation       Current Outpatient Medications   Medication Sig Dispense Refill    insulin degludec (TRESIBA FLEXTOUCH) 100 UNIT/ML Subcutaneous Solution Pen-injector Inject 10 Units into the skin nightly. 15 mL 1    Insulin Pen Needle (BD PEN NEEDLE ADEEL U/F) 32G X 4 MM Does not apply Misc Inject 1 Pen into the skin daily. 90 each 2    Continuous Glucose Sensor (DEXCOM G7 SENSOR) Does not apply Misc 1 each Every 10 days. 9 each 1    levothyroxine 88 MCG Oral Tab Take 1 tablet (88 mcg total) by mouth daily. 90 tablet 3    omeprazole 20 MG Oral Capsule Delayed Release Take 1 capsule (20 mg total) by mouth daily. 90 capsule 3    rosuvastatin (CRESTOR) 5 MG Oral Tab Take 1 tablet (5 mg total) by mouth nightly. 90 tablet 0    metFORMIN  MG Oral Tablet 24 Hr TAKE TWO TABLETS BY MOUTH TWICE DAILY WITH MEALS 360 tablet 1    hydroCHLOROthiazide 12.5 MG Oral Tab Take 1 tablet (12.5 mg total) by mouth daily. 90 tablet 1    CITALOPRAM 40 MG Oral Tab Take 1 tablet (40 mg total) by mouth daily. 90 tablet 2    LOSARTAN 25 MG Oral Tab Take 1 tablet (25 mg total) by mouth daily. 90 tablet 2    Glucose Blood (CONTOUR NEXT TEST) In Vitro Strip Use twice daily to check blood sugar 200 strip 3    clobetasol 0.05 % External Ointment Apply 1 Application topically at  bedtime. Apply a thin layer to affected area at HS nightly for 12 weeks 30 g 0    cetirizine 10 MG Oral Tab Take 1 tablet (10 mg total) by mouth daily.      Magnesium-Potassium 250-100 MG Oral Tab Take 1 tablet by mouth daily.       Past Medical History:    Allergic rhinitis    COVID    S/S: cough, cold, aches, pneumonia - no hospitalization     Diverticulosis    Seen on screening colonoscopy    DM2 (diabetes mellitus, type 2) (HCC)    Essential hypertension    Fatty liver    Incidental finding on CT chest 2020 at [facility]    GERD (gastroesophageal reflux disease)    History of blood transfusion    during      Hyperlipidemia    Hypertension    Hypothyroid    Osteoarthritis    UPPER BACK     Sarcoid    Visual impairment    GLASSES     Family History   Problem Relation Age of Onset    Kidney Disease Mother     Hypertension Mother     Other (CHF) Mother     Other (Aortic aneurysm) Father     Heart Disease Brother     Other (Leukemia) Brother 72    No Known Problems Brother     No Known Problems Son      Social history: Reviewed.      ROS/Exam    REVIEW OF SYSTEMS: Ten point review of systems has been performed and is otherwise negative and/or non-contributory, except as described above.     VITALS  Vitals:    25 1042   BP: 124/76   BP Location: Left arm   Patient Position: Sitting   Cuff Size: adult   Pulse: 68   SpO2: 99%   Weight: 224 lb 3.2 oz (101.7 kg)   Height: 5' 4\" (1.626 m)       Wt Readings from Last 6 Encounters:   25 224 lb 3.2 oz (101.7 kg)   25 237 lb 3.2 oz (107.6 kg)   25 236 lb (107 kg)   24 200 lb (90.7 kg)   24 215 lb 3.2 oz (97.6 kg)   24 202 lb 3.2 oz (91.7 kg)       PHYSICAL EXAM  CONSTITUTIONAL:  awake, alert, cooperative, no apparent distress, and appears stated age   PSYCH: normal affect  LUNGS: breathing comfortably  CARDIOVASCULAR:  regular rate     Labs/Imaging: Pertinent imaging reviewed.    Overall glucose control:  Lab Results   Component  Value Date    A1C 10.3 (H) 02/12/2025    A1C 6.6 (H) 07/24/2024    A1C 6.3 (H) 12/08/2023    A1C 6.1 (H) 11/21/2023    A1C 6.3 (H) 05/18/2023       Supplementary Documentation:   -Surveillance for Diabetes Complications & Risks  Foot exam/neuropathy: Last Foot Exam: 02/12/25    Retinopathy screening: sees Dr. Oppenheim  No data recordedNo data recorded    Assessment & Plan:     ICD-10-CM    1. Type 2 diabetes mellitus with hyperglycemia, without long-term current use of insulin ([facility])  E11.65 HemoCue Glucose 201 (Glucose blood test)     IA-2 Autoantibodies     MADAY-65 Autoantibody     Zinc Transporter 8 (ZnT8) Antibodies     Islet Cell Cytoplasmic Antibody, IgG      2. Hyperlipidemia associated with type 2 diabetes mellitus ([facility])  E11.69     E78.5       3. Hypertension associated with type 2 diabetes mellitus ([facility])  E11.59     I15.2       4. Class 2 severe obesity due to excess calories with serious comorbidity and body mass index (BMI) of 38.0 to 38.9 in adult ([facility])  E66.812     E66.01     Z68.38       5. Hypothyroidism due to Hashimoto thyroiditis  E06.3             Madison Muñoz is a very pleasant 64 year old female here for evaluation of:    #Type 2 Diabetes- PMHx of Type 2 diabetes mellitus diagnosed age 51.  Intolerant to several type 2 diabetes medications.  She saw great success with weight loss and diabetes management with keto diet.       A1C worsened Feb 2025, was sick, on steroids in Feb. Given personal hx of autoimmunity (hashimotos, sarcoidosis), eval for ALFONSO. Starting insulin today. Stop jardiance due to ongoing genital fungal infxn.  Start dexcom CGM today.     Last A1c value was 10.3% done 2/12/2025. Goal <7%. Importance of better glucose control in preventing onset/progression of end-organ damage discussed, as well as goals of therapy and clinical significance of A1C.     Start Taking               insulin degludec (TRESIBA FLEXTOUCH) 100 UNIT/ML Subcutaneous Solution Pen-injector Inject 10  Units into the skin nightly.    Insulin Pen Needle (BD PEN NEEDLE ADEEL U/F) 32G X 4 MM Does not apply Misc Inject 1 Pen into the skin daily.    Continuous Glucose Sensor (DEXCOM G7 SENSOR) Does not apply Misc 1 each Every 10 days.          Stop Taking                empagliflozin (JARDIANCE) 25 MG Oral Tab    Take 1 tablet (25 mg total) by mouth daily.            UPDATED MED LIST:  Diabetic Medications               insulin degludec (TRESIBA FLEXTOUCH) 100 UNIT/ML Subcutaneous Solution Pen-injector (Taking) Inject 10 Units into the skin nightly.    metFORMIN  MG Oral Tablet 24 Hr (Taking) TAKE TWO TABLETS BY MOUTH TWICE DAILY WITH MEALS            - did not tolerate ozempic (tried x2), trulicity-  nausea, loss of appetite, vomiting, mounjaro- vomiting  - intolerant of jardiance- ongoing yeast infection- working with gyne on diagnosis  - check BG 1x/day   -See above header \"Supplementary Documentation\" for surveillance for diabetes complications & risks    Nephropathy screening  Stable, continue annual monitoring  Lab Results   Component Value Date    EGFRCR 98 02/12/2025    MICROALBCREA 9.7 02/12/2025      #Hypertension   -SBP is to goal <130   BP Readings from Last 1 Encounters:   03/24/25 124/76   BP Meds: hydroCHLOROthiazide Tabs - 12.5 MG; losartan Tabs - 25 MG     #Hyperlipidemia   -  LDL is not to goal <70  Lab Results   Component Value Date     (H) 02/12/2025    TRIG 297 (H) 02/12/2025   Cholesterol Meds: rosuvastatin Tabs - 5 MG     #Hypothyroidism   Recent Labs     05/18/23  0932 12/08/23  0830 07/24/24  0900 02/12/25  0802   T4F 1.4 1.2  --  1.4   TSH 0.131* 0.566 1.238 1.025   Longstanding hypothyroidism history. TFTs stable taking levothyroxine Tabs - 88 MCG. Continue follow up with PCP.        Return in about 6 weeks (around 5/5/2025) for diabetes follow up.    A total of 40 minutes was spent today on obtaining history, reviewing pertinent labs, teaching provided w/ insulin pen  demonstration, dexcom set up,  evaluating patient, providing multiple treatment options, reinforcing diet/exercise and compliance, and completing documentation.       3/24/2025  NNEKA Cook     Note to patient: The 21 Century Cures Act makes medical notes like these available to patients in the interest of transparency. However, be advised this is a medical document. It is intended as peer to peer communication. It is written in medical language and may contain abbreviations or verbiage that are unfamiliar. It may appear blunt or direct. Medical documents are intended to carry relevant information, facts as evident, and the clinical opinion of the practitioner.

## 2025-03-24 NOTE — PROGRESS NOTES
Dexcom G7 sample provided in office:    Lot #: [id number]  Expires: 11-    Reviewed trends/arrows, when to use finger stick as a back up for treatment decisions, warnings/alarms, to keep bluetooth on and keep phone within 20 ft. Reviewed site selection and rotation. Reviewed hand hygiene, cleaning site, sensor application.     Patient successfully applied sensor to left upper arm. Initiated 22 minute warm up period.     Reviewed if any questions to follow up with office.     Patient left clinic with no further questions.

## 2025-03-25 ENCOUNTER — OFFICE VISIT (OUTPATIENT)
Dept: OBGYN CLINIC | Facility: CLINIC | Age: 65
End: 2025-03-25
Payer: COMMERCIAL

## 2025-03-25 VITALS
WEIGHT: 229.5 LBS | BODY MASS INDEX: 39.18 KG/M2 | DIASTOLIC BLOOD PRESSURE: 72 MMHG | HEIGHT: 64 IN | HEART RATE: 67 BPM | SYSTOLIC BLOOD PRESSURE: 132 MMHG

## 2025-03-25 DIAGNOSIS — N76.0 VAGINITIS AND VULVOVAGINITIS: Primary | ICD-10-CM

## 2025-03-25 PROCEDURE — 81514 NFCT DS BV&VAGINITIS DNA ALG: CPT | Performed by: NURSE PRACTITIONER

## 2025-03-25 PROCEDURE — 99213 OFFICE O/P EST LOW 20 MIN: CPT | Performed by: NURSE PRACTITIONER

## 2025-03-25 RX ORDER — CLOTRIMAZOLE AND BETAMETHASONE DIPROPIONATE 10; .64 MG/G; MG/G
1 CREAM TOPICAL 2 TIMES DAILY
Qty: 45 G | Refills: 0 | Status: SHIPPED | OUTPATIENT
Start: 2025-03-25 | End: 2025-04-04

## 2025-03-25 RX ORDER — FLUCONAZOLE 150 MG/1
TABLET ORAL
Qty: 3 TABLET | Refills: 0 | Status: SHIPPED | OUTPATIENT
Start: 2025-03-25

## 2025-03-25 NOTE — PROGRESS NOTES
Gyne note     S: patient is a 64 year old yo  here for recurrent vulvar itching and rash. 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 She ran out of her prescription but had a flare up recently. It seems to be spreading between her rectum and perineum, on her hips and even to her underarms. She has tried Nystatin with no improvement.    Review of Systems:  General: denies fevers, chills, fatigue and malaise.     O:/72   Pulse 67   Ht 64\"   Wt 229 lb 8 oz (104.1 kg)   BMI 39.39 kg/m²   Gen NAD     GYNE/: External Genitalia: Significant erythema throughout bilateral labia minora, majora, perineum, and groin extending to her perirectal area. Her labia minora adjacent to her clitoral area has minimal white discharge and slight hypopigmentation. Urethral meatus appear wnl, no abnormal discharge or lesions noted.                                Vagina: atrophic mucosa, no lesions, scant yellow discharge.                      Cervical cuff intact    A/P:  1. Vaginitis and vulvovaginitis  - Vaginitis Vaginosis PCR Panel; Future  - fluconazole (DIFLUCAN) 150 MG Oral Tab; 1 pill PO QOD for 3 doses  Dispense: 3 tablet; Refill: 0  - clotrimazole-betamethasone 1-0.05 % External Cream; Apply 1 Application topically 2 (two) times daily for 10 days.  Dispense: 45 g; Refill: 0  - Vaginitis Vaginosis PCR Panel    I encouraged she establish care with dermatology given the widespread nature of her symptoms as soon as she can

## 2025-03-26 LAB
BV BACTERIA DNA VAG QL NAA+PROBE: NEGATIVE
C GLABRATA DNA VAG QL NAA+PROBE: NEGATIVE
C KRUSEI DNA VAG QL NAA+PROBE: NEGATIVE
CANDIDA DNA VAG QL NAA+PROBE: POSITIVE
T VAGINALIS DNA VAG QL NAA+PROBE: NEGATIVE

## 2025-03-27 LAB — PANCREATIC ISLET CELLS: NEGATIVE

## 2025-03-31 LAB — GAD-65: <5 U/ML

## 2025-04-01 LAB — IA-2 AUTOABS: <7.5 U/ML

## 2025-04-02 LAB — ZNT8 ABS: <15 U/ML

## 2025-04-03 ENCOUNTER — PATIENT MESSAGE (OUTPATIENT)
Facility: CLINIC | Age: 65
End: 2025-04-03

## 2025-04-04 RX ORDER — ACYCLOVIR 400 MG/1
1 TABLET ORAL
Qty: 9 EACH | Refills: 1 | Status: SHIPPED | OUTPATIENT
Start: 2025-04-04

## 2025-04-07 ENCOUNTER — TELEPHONE (OUTPATIENT)
Facility: CLINIC | Age: 65
End: 2025-04-07

## 2025-04-07 NOTE — TELEPHONE ENCOUNTER
Received fax from [employer] stating they do not process PA requests for patients rx benefit program. Called patient pharmacy to confirm. Pharmacy tech provided [employer] phone number to contact for additional information. Spoke with Haleigh who states that patient benefits are through RX benefits and provided phone number to contact for PA.     Called Rx Benefits PA dept at [phone] and requested PA forms be faxed to office for completion. Will await forms.

## 2025-04-07 NOTE — TELEPHONE ENCOUNTER
Received PA request for patients Dexcom G7 sensor. Initated PA in [facility]. Questions answered. Unable to send to plan electronically. PA was faxed to plan as a paper copy. Will await determination.     Key: [id number]

## 2025-04-18 ENCOUNTER — MED REC SCAN ONLY (OUTPATIENT)
Facility: CLINIC | Age: 65
End: 2025-04-18

## 2025-05-02 RX ORDER — ROSUVASTATIN CALCIUM 5 MG/1
5 TABLET, COATED ORAL NIGHTLY
Qty: 90 TABLET | Refills: 0 | Status: SHIPPED | OUTPATIENT
Start: 2025-05-02

## 2025-05-12 ENCOUNTER — OFFICE VISIT (OUTPATIENT)
Facility: CLINIC | Age: 65
End: 2025-05-12
Payer: COMMERCIAL

## 2025-05-12 VITALS
BODY MASS INDEX: 39.16 KG/M2 | WEIGHT: 229.38 LBS | HEIGHT: 64 IN | DIASTOLIC BLOOD PRESSURE: 72 MMHG | SYSTOLIC BLOOD PRESSURE: 150 MMHG | OXYGEN SATURATION: 96 % | HEART RATE: 62 BPM

## 2025-05-12 DIAGNOSIS — E06.3 HYPOTHYROIDISM DUE TO HASHIMOTO THYROIDITIS: ICD-10-CM

## 2025-05-12 DIAGNOSIS — E78.5 HYPERLIPIDEMIA ASSOCIATED WITH TYPE 2 DIABETES MELLITUS (HCC): ICD-10-CM

## 2025-05-12 DIAGNOSIS — E11.65 TYPE 2 DIABETES MELLITUS WITH HYPERGLYCEMIA, WITHOUT LONG-TERM CURRENT USE OF INSULIN (HCC): Primary | ICD-10-CM

## 2025-05-12 DIAGNOSIS — E11.69 HYPERLIPIDEMIA ASSOCIATED WITH TYPE 2 DIABETES MELLITUS (HCC): ICD-10-CM

## 2025-05-12 DIAGNOSIS — E11.59 HYPERTENSION ASSOCIATED WITH TYPE 2 DIABETES MELLITUS (HCC): ICD-10-CM

## 2025-05-12 DIAGNOSIS — I15.2 HYPERTENSION ASSOCIATED WITH TYPE 2 DIABETES MELLITUS (HCC): ICD-10-CM

## 2025-05-12 PROCEDURE — 99214 OFFICE O/P EST MOD 30 MIN: CPT

## 2025-05-12 RX ORDER — INSULIN DEGLUDEC 100 U/ML
14 INJECTION, SOLUTION SUBCUTANEOUS NIGHTLY
Qty: 30 ML | Refills: 1 | Status: SHIPPED | OUTPATIENT
Start: 2025-05-12

## 2025-05-12 RX ORDER — ACYCLOVIR 400 MG/1
1 TABLET ORAL
Qty: 9 EACH | Refills: 3 | Status: SHIPPED | OUTPATIENT
Start: 2025-05-12

## 2025-05-12 RX ORDER — PEN NEEDLE, DIABETIC 32GX 5/32"
1 NEEDLE, DISPOSABLE MISCELLANEOUS 4 TIMES DAILY
Qty: 150 EACH | Refills: 5 | Status: SHIPPED | OUTPATIENT
Start: 2025-05-12 | End: 2026-05-12

## 2025-05-12 RX ORDER — INSULIN ASPART 100 [IU]/ML
INJECTION, SOLUTION INTRAVENOUS; SUBCUTANEOUS
Qty: 30 ML | Refills: 2 | Status: SHIPPED | OUTPATIENT
Start: 2025-05-12

## 2025-05-12 NOTE — PROGRESS NOTES
The following individual(s) verbally consented to be recorded using ambient AI listening technology and understand that they can each withdraw their consent to this listening technology at any point by asking the clinician to turn off or pause the recording:    Patient name: Madison Muñoz

## 2025-05-12 NOTE — PATIENT INSTRUCTIONS
Return Visit:  With NNEKA Cook: Return in about 2 months (around 7/12/2025) for diabetes follow up, can be video visit.      - Dexcom PW: [email or web address]    - complete your diabetes eye exam. This exam is critical to preventing and treating eye conditions caused by diabetes that could potentially lead to vision loss. Once complete, please call your eye care provider and ask them to fax your latest report to our office. This will help us update our records. Our fax number is: [phone]    How to connect to the clinic- Dexcom users  IF USING DEXCOM G7:   Open the Dexcom G7 joan  Tap the Connections tab  Tap [facility]  Tap Continue  Enter the [facility] Code         THE CLINIC CODE IS: [id number]  Confirm clinic details  Tap Done      Updated diabetes medication instructions from today:   - check your blood sugar before you eat breafkast, lunch and dinner. Based on the number, take a dose of novolog   Take 1 unit for every 50 points starting at 140  <80- No insuln before the meal  - Take 4u before the meal  141-190= 5 unit of insulin  191-240= 6 units of insulin  241-290= 7 units of insulin  291-340= 8 units of insulin  341-390= 9 units of insulin  391-440= 10 units of insulin     Diabetic Medications              insulin degludec (TRESIBA FLEXTOUCH) 100 UNIT/ML Subcutaneous Solution Pen-injector (Taking) Inject 14 Units into the skin nightly. Actively titrating per endo instruction, max TDD 20u.    insulin aspart (NOVOLOG FLEXPEN) 100 Units/mL Subcutaneous Solution Pen-injector (Taking) Take 4u with meals plus 1u for every 50 points over 150 three times daily before meals. Max TDD 30u    metFORMIN  MG Oral Tablet 24 Hr (Taking) TAKE TWO TABLETS BY MOUTH TWICE DAILY WITH MEALS            Lab Results   Component Value Date    A1C 10.3 (H) 02/12/2025    A1C 6.6 (H) 07/24/2024    A1C 6.3 (H) 12/08/2023    A1C 6.1 (H) 11/21/2023    A1C 6.3 (H) 05/18/2023        General follow up  information:  Please let us know if you require any refills at least 1 week prior to your medication running out. If you do run out of medication, please call our office ASAP to request refills (do not wait until your follow up).   Please call our office if sugars at home are consistently greater than 250 or less than 70 for medication adjustment (do not wait until your follow up appointment).  Lab results and imaging will typically be reviewed at follow up appointments, or within 3-5 business days of ALL results being in if you do not have an appointment scheduled in the near future. Our office will contact you for any abnormal results requiring more urgent follow up or action.   The on-call pager is for urgent matters only. If you are a type 1 diabetic and run out of insulin after business hours 8AM-4PM, you may call the on-call pager for a refill to a 24 hour pharmacy.  If you have adrenal insufficiency and run out of steroids, you may call the on-call pager for a refill to a 24 hour pharmacy. All other refill requests should be requested during business hours.    Office phone number: [phone]; phones are open Monday-Friday 8:30-4:30.       HOW TO TREAT LOW BLOOD SUGAR (Hypoglycemia)  Low blood sugar= Less than 70, or if you start to have symptoms (below)  Symptoms: Shaking or trembling, fast heart rate, extreme hunger, sweating, confusion/difficulty concentrating, dizziness.    How to treat a low blood sugar if you are able to eat/drink: The Rule of 15/15  If you are using continuous glucose monitor that says you are low, but you do not have any symptoms, verify on fingerstick that your blood sugar is actually low before treating.   Eat 15 grams of carbs (see examples below)  Check your blood sugar after 15 minutes. If it’s still below your target range, have another serving.   Repeat these steps until it’s in your target range. Once it’s in range, if you're nervous about your sugar going low again, have a  protein source (ie, a spoonful of peanut butter).   If you have a CGM you want to look for how your arrow has changed. If you arrow is pointed up or sideways after 15 min, give your CGM more time OR check with a finger stick. Try not to eat more food until at least 15 min after the first BG check - otherwise you risk having a rebound high.  If you are experiencing symptoms and you are unable to check your blood glucose for any reason, treat the hypoglycemia.  If someone has a low blood sugar and is unconscious: Don’t hesitate to call 911. If someone is unconscious and glucagon is not available or someone does not know how to use it, call 911 immediately.     To treat a low, I recommend you carry with you easy, pre-portioned treatment for low blood sugars that are 15G of carbs:   - Children sized squeeze pouch applesauce (high fiber + carbs help prevent too high of a spike)  - Small children's sized juicebox- 15g carb --> 4oz juice box  - Glucose tablets from [employer], you can find them near diabetes supplies --> Note, you will need to eat 3-4 tablets to get to 15g of carbs  - Children sized fruit snack pack- look for one with 15 grams of total carbohydrate  - Choice of how to treat your low is important. Complex carbs, or foods that contain fats along with carbs (like chocolate) can slow the absorption of glucose and should NOT be used to treat an emergency low

## 2025-05-12 NOTE — PROGRESS NOTES
[facility]    Name: Madison Muñoz    Date: 5/12/2025    Madison Muñoz is a 64 year old female who presents for evaluation of T2DM management.     Chief complaint: Follow - Up (Pt presents for DM follow up. Repeat A1C completed in clinic. Pt states did not  Iain sensor, is currently using fingersticks.States have been high around  upper 100-200s. No new concerns, no med changes or refills needed.)       Subjective:   History of Present Illness  Madison Muñoz is a 64 year old female with type 2 diabetes who presents for follow-up of her blood sugar management and skin rash.    She manages her diabetes with metformin 1000 mg twice daily and Tresiba insulin at 10 units per day. Her blood sugar levels remain elevated, with morning readings of 192 and 238 mg/dL. Hemoglobin A1c has decreased from 10.3% in February to 9.1% currently. She experiences no side effects from the insulin but is concerned about high glucose levels.    She developed a severe rash and yeast infection attributed to Jardiance, requiring dermatological care. The rash is intensely pruritic, causing bruising from scratching, and is located in the groin area without vaginal involvement. She uses a prescribed body wash, cream, and powder, but the rash persists.    DM meds at office visit:   Diabetic Medications              insulin degludec (TRESIBA FLEXTOUCH) 100 UNIT/ML Subcutaneous Solution Pen-injector (Taking) Inject 14 Units into the skin nightly. Actively titrating per endo instruction, max TDD 20u.    metFORMIN  MG Oral Tablet 24 Hr (Taking) TAKE TWO TABLETS BY MOUTH TWICE DAILY WITH MEALS            Previously trialed/failed DM meds:   -ozempic (tried x2)  -trulicity-  nausea, loss of appetite, vomiting  -mounjaro- vomiting  - jardiance- ongoing yeast infection    History/Other:    Allergies, PMH, SocHx and FHx reviewed and updated as appropriate in Epic on Current Medications[1]  Allergies[2]  Current  Medications[3]  Past Medical History[4]  Family History[5]  Social history: Reviewed.      ROS/Exam    REVIEW OF SYSTEMS: Ten point review of systems has been performed and is otherwise negative and/or non-contributory, except as described above.     VITALS  Vitals:    05/12/25 1004 05/12/25 1036   BP: 140/72 150/72   BP Location: Right arm    Patient Position: Sitting    Cuff Size: large    Pulse: 62    SpO2: 96%    Weight: 229 lb 6.4 oz (104.1 kg)    Height: 5' 4\" (1.626 m)      Body mass index is 39.38 kg/m².    Wt Readings from Last 6 Encounters:   05/12/25 229 lb 6.4 oz (104.1 kg)   03/25/25 229 lb 8 oz (104.1 kg)   03/24/25 224 lb 3.2 oz (101.7 kg)   02/12/25 237 lb 3.2 oz (107.6 kg)   01/26/25 236 lb (107 kg)   08/26/24 200 lb (90.7 kg)       PHYSICAL EXAM  CONSTITUTIONAL:  awake, alert, cooperative, no apparent distress, and appears stated age    PSYCH: normal affect  LUNGS: breathing comfortably  CARDIOVASCULAR:  regular rate      Labs/Imaging: Pertinent imaging reviewed.    Thyroid labs (if present in chart):  Recent Labs     05/18/23  0932 12/08/23  0830 07/24/24  0900 02/12/25  0802   T4F 1.4 1.2  --  1.4   TSH 0.131* 0.566 1.238 1.025        Thyroid ultrasound results (if present in chart):  No results found.    Overall glucose control:  Lab Results   Component Value Date    A1C 10.3 (H) 02/12/2025    A1C 6.6 (H) 07/24/2024    A1C 6.3 (H) 12/08/2023    A1C 6.1 (H) 11/21/2023    A1C 6.3 (H) 05/18/2023       Supplementary Documentation:   -Surveillance for Diabetes Complications & Risks  Foot exam/neuropathy: Last Foot Exam: 02/12/25    Retinopathy screening: No data recordedNo data recorded    Lipid screening:   Lab Results   Component Value Date     (H) 02/12/2025    TRIG 297 (H) 02/12/2025   Cholesterol Meds: rosuvastatin Tabs - 5 MG      Nephropathy screening:   Lab Results   Component Value Date    EGFRCR 98 02/12/2025    MICROALBCREA 9.7 02/12/2025   No results found for: \"CREAUR\",  \"MICROALBUMIN\", \"MALBCREACALC\"    Blood pressure control:   BP Readings from Last 1 Encounters:   05/12/25 150/72   BP Meds: hydroCHLOROthiazide Tabs - 12.5 MG; losartan Tabs - 25 MG       Assessment & Plan:     ICD-10-CM    1. Type 2 diabetes mellitus with hyperglycemia, without long-term current use of insulin ([facility])  E11.65 POC Hemoglobin A1C      2. Hyperlipidemia associated with type 2 diabetes mellitus ([facility])  E11.69     E78.5       3. Hypertension associated with type 2 diabetes mellitus ([facility])  E11.59     I15.2       4. Hypothyroidism due to Hashimoto thyroiditis  E06.3           Madison Muñoz is a pleasant 64 year old female here for evaluation of:    Assessment & Plan  Type 2 diabetes mellitus with hyperglycemia  A1c improved from 10.3% to 9.1% since February 12, 2025. Current regimen includes metformin 1000 mg BID and Tresiba 10 units at night. Blood glucose levels remain elevated, with morning readings of 192-238 mg/dL. Insurance issues prevented initiation of CGM. Negative for ALFONSO antibodies, indicating no autoimmune component. Discussed the need for better glycemic control to help resolve candidiasis. Explained the function of long-acting versus short-acting insulin and the potential for pancreas recovery with improved control. Discussed the use of a correction scale for insulin dosing to manage postprandial spikes.  - Increase Tresiba to 14 units at night.  - Initiate short-acting insulin with meals using a correction scale: 4 units if blood glucose is 100-150 mg/dL, 5 units if 150-200 mg/dL, and adjust accordingly.  - Order Dexcom CGM and pursue prior authorization.  - Provide glycemic index information for dietary guidance.  - Reassess insulin needs and consider glimepiride in the future if pancreas function improves.  - Provided w/ glycemic index handout, discussed carb aware diet (LookAcross software was turned off)     Candidiasis of skin and nails  Persistent candidiasis of the skin, primarily in  the groin area, likely exacerbated by hyperglycemia. Previous treatment with topical antifungals and body wash prescribed by dermatology. Symptoms include severe itching and bruising from scratching. Discussed the importance of glycemic control in resolving the infection.  - Focus on improving glycemic control to aid in resolving candidiasis.    Hyperlipidemia, unspecified  Hyperlipidemia with LDL of 136 mg/dL and triglycerides of 297 mg/dL as of February 12, 2025. She was not on rosuvastatin at the time of testing. Discussed the role of Crestor (rosuvastatin) in managing cholesterol levels and the potential for improved triglycerides with better glycemic control.  - Start Crestor (rosuvastatin) 5 mg daily as previously prescribed.  - Monitor lipid levels and reassess as needed.    Hypertension  Hypertension-- not controlled in office today. Compliant with current medication regimen. Blood pressure readings are within target range while at home. Discussed the importance of home monitoring for accurate assessment.  - Continue current antihypertensive regimen.  - Continue follow up with PCP   - Monitor blood pressure at home regularly.    Hypothyroidism   Longstanding hypothyroidism history. TFTs stable taking levothyroxine Tabs - 88 MCG. Continue follow up with PCP.         Recording duration: 20 minutes    Updated DM med list:   Diabetic Medications              insulin degludec (TRESIBA FLEXTOUCH) 100 UNIT/ML Subcutaneous Solution Pen-injector (Taking) Inject 14 Units into the skin nightly. Actively titrating per endo instruction, max TDD 20u.    insulin aspart (NOVOLOG FLEXPEN) 100 Units/mL Subcutaneous Solution Pen-injector (Taking) Take 4u with meals plus 1u for every 50 points over 150 three times daily before meals. Max TDD 30u    metFORMIN  MG Oral Tablet 24 Hr (Taking) TAKE TWO TABLETS BY MOUTH TWICE DAILY WITH MEALS          See above header \"Supplementary Documentation\" for surveillance for diabetes  complications & risks    Return in about 2 months (around 7/12/2025) for diabetes follow up, can be video visit.    NNEKA Cook  Endocrinology, Diabetes & Metabolism   5/12/2025    Note to patient: The 21 Century Cures Act makes medical notes like these available to patients in the interest of transparency. However, be advised this is a medical document. It is intended as peer to peer communication. It is written in medical language and may contain abbreviations or verbiage that are unfamiliar. It may appear blunt or direct. Medical documents are intended to carry relevant information, facts as evident, and the clinical opinion of the practitioner.          [1]    insulin degludec (TRESIBA FLEXTOUCH) 100 UNIT/ML Subcutaneous Solution Pen-injector Inject 14 Units into the skin nightly. Actively titrating per endo instruction, max TDD 20u. 30 mL 1    insulin aspart (NOVOLOG FLEXPEN) 100 Units/mL Subcutaneous Solution Pen-injector Take 4u with meals plus 1u for every 50 points over 150 three times daily before meals. Max TDD 30u 30 mL 2    Insulin Pen Needle (BD PEN NEEDLE ADEEL U/F) 32G X 4 MM Does not apply Misc Inject 1 Pen into the skin in the morning, at noon, in the evening, and at bedtime. 150 each 5    Continuous Glucose Sensor (DEXCOM G7 SENSOR) Does not apply Misc 1 each Every 10 days. 9 each 3    ROSUVASTATIN 5 MG Oral Tab TAKE 1 TABLET (5 MG TOTAL) BY MOUTH NIGHTLY. 90 tablet 0    fluconazole (DIFLUCAN) 150 MG Oral Tab 1 pill PO QOD for 3 doses 3 tablet 0    levothyroxine 88 MCG Oral Tab Take 1 tablet (88 mcg total) by mouth daily. 90 tablet 3    omeprazole 20 MG Oral Capsule Delayed Release Take 1 capsule (20 mg total) by mouth daily. 90 capsule 3    metFORMIN  MG Oral Tablet 24 Hr TAKE TWO TABLETS BY MOUTH TWICE DAILY WITH MEALS 360 tablet 1    hydroCHLOROthiazide 12.5 MG Oral Tab Take 1 tablet (12.5 mg total) by mouth daily. 90 tablet 1    CITALOPRAM 40 MG Oral Tab Take 1 tablet (40 mg  total) by mouth daily. 90 tablet 2    LOSARTAN 25 MG Oral Tab Take 1 tablet (25 mg total) by mouth daily. 90 tablet 2    Glucose Blood (CONTOUR NEXT TEST) In Vitro Strip Use twice daily to check blood sugar 200 strip 3    cetirizine 10 MG Oral Tab Take 1 tablet (10 mg total) by mouth daily.      Magnesium-Potassium 250-100 MG Oral Tab Take 1 tablet by mouth daily.     [2]   Allergies  Allergen Reactions    Amoxicillin-Pot Clavulanate NAUSEA ONLY and RASH    Nitroglycerin HYPOTENSION and OTHER (SEE COMMENTS)     \"reacts too fast\" - per pt  Syncope in ER      Tramadol RASH and NAUSEA ONLY     Also double vision      Lipitor [Atorvastatin] MYALGIA     Possibly cause her some chest muscle pain. Has Sarcoid which could have also caused similar symptoms    Ozempic (0.25 Or 0.5 Mg-Dose) [Semaglutide] NAUSEA AND VOMITING    Diazepam OTHER (SEE COMMENTS)     Prolonged effect to diazepam 10 (per pt)  Loss of memory, severe sedation     [3]   Current Outpatient Medications   Medication Sig Dispense Refill    insulin degludec (TRESIBA FLEXTOUCH) 100 UNIT/ML Subcutaneous Solution Pen-injector Inject 14 Units into the skin nightly. Actively titrating per endo instruction, max TDD 20u. 30 mL 1    insulin aspart (NOVOLOG FLEXPEN) 100 Units/mL Subcutaneous Solution Pen-injector Take 4u with meals plus 1u for every 50 points over 150 three times daily before meals. Max TDD 30u 30 mL 2    Insulin Pen Needle (BD PEN NEEDLE ADEEL U/F) 32G X 4 MM Does not apply Misc Inject 1 Pen into the skin in the morning, at noon, in the evening, and at bedtime. 150 each 5    Continuous Glucose Sensor (DEXCOM G7 SENSOR) Does not apply Misc 1 each Every 10 days. 9 each 3    ROSUVASTATIN 5 MG Oral Tab TAKE 1 TABLET (5 MG TOTAL) BY MOUTH NIGHTLY. 90 tablet 0    fluconazole (DIFLUCAN) 150 MG Oral Tab 1 pill PO QOD for 3 doses 3 tablet 0    levothyroxine 88 MCG Oral Tab Take 1 tablet (88 mcg total) by mouth daily. 90 tablet 3    omeprazole 20 MG Oral  Capsule Delayed Release Take 1 capsule (20 mg total) by mouth daily. 90 capsule 3    metFORMIN  MG Oral Tablet 24 Hr TAKE TWO TABLETS BY MOUTH TWICE DAILY WITH MEALS 360 tablet 1    hydroCHLOROthiazide 12.5 MG Oral Tab Take 1 tablet (12.5 mg total) by mouth daily. 90 tablet 1    CITALOPRAM 40 MG Oral Tab Take 1 tablet (40 mg total) by mouth daily. 90 tablet 2    LOSARTAN 25 MG Oral Tab Take 1 tablet (25 mg total) by mouth daily. 90 tablet 2    Glucose Blood (CONTOUR NEXT TEST) In Vitro Strip Use twice daily to check blood sugar 200 strip 3    cetirizine 10 MG Oral Tab Take 1 tablet (10 mg total) by mouth daily.      Magnesium-Potassium 250-100 MG Oral Tab Take 1 tablet by mouth daily.      clobetasol 0.05 % External Ointment Apply 1 Application topically at bedtime. Apply a thin layer to affected area at HS nightly for 12 weeks (Patient not taking: Reported on 2025) 30 g 0   [4]   Past Medical History:   Allergic rhinitis    COVID    S/S: cough, cold, aches, pneumonia - no hospitalization     Diverticulosis    Seen on screening colonoscopy    DM2 (diabetes mellitus, type 2) (HCC)    Essential hypertension    Fatty liver    Incidental finding on CT chest 2020 at [facility]    GERD (gastroesophageal reflux disease)    History of blood transfusion    during      Hyperlipidemia    Hypertension    Hypothyroid    Osteoarthritis    UPPER BACK     Sarcoid    Visual impairment    GLASSES   [5]   Family History  Problem Relation Age of Onset    Kidney Disease Mother     Hypertension Mother     Other (CHF) Mother     Other (Aortic aneurysm) Father     Heart Disease Brother     Other (Leukemia) Brother 72    No Known Problems Brother     No Known Problems Son

## 2025-05-13 ENCOUNTER — MED REC SCAN ONLY (OUTPATIENT)
Dept: FAMILY MEDICINE CLINIC | Facility: CLINIC | Age: 65
End: 2025-05-13

## 2025-05-13 ENCOUNTER — TELEPHONE (OUTPATIENT)
Facility: CLINIC | Age: 65
End: 2025-05-13

## 2025-05-13 NOTE — TELEPHONE ENCOUNTER
Received fax from patient pharmacy requesting PA for Dexcom sensor. PA unable to be complete in CMM, needs to be sent through [employer]. Printed PA form, filled out and palced in provider in basket along with most recent chart notes.

## 2025-05-14 ENCOUNTER — TELEPHONE (OUTPATIENT)
Facility: CLINIC | Age: 65
End: 2025-05-14

## 2025-05-14 NOTE — TELEPHONE ENCOUNTER
Patient telephoned in clarifying how she is taking her novolog. Per visit on 5/12  Updated diabetes medication instructions from today:   - check your blood sugar before you eat breafkast, lunch and dinner. Based on the number, take a dose of novolog   Take 1 unit for every 50 points starting at 140  <80- No insuln before the meal  - Take 4u before the meal  141-190= 5 unit of insulin  191-240= 6 units of insulin  241-290= 7 units of insulin  291-340= 8 units of insulin  341-390= 9 units of insulin  391-440= 10 units of insulin      Patient had this information and verbally understood.     Closing encounter.

## 2025-05-20 DIAGNOSIS — E11.9 TYPE 2 DIABETES MELLITUS WITHOUT COMPLICATION, WITHOUT LONG-TERM CURRENT USE OF INSULIN (HCC): ICD-10-CM

## 2025-05-20 RX ORDER — METFORMIN HYDROCHLORIDE 500 MG/1
1000 TABLET, EXTENDED RELEASE ORAL 2 TIMES DAILY WITH MEALS
Qty: 360 TABLET | Refills: 0 | Status: SHIPPED | OUTPATIENT
Start: 2025-05-20

## 2025-05-20 RX ORDER — HYDROCHLOROTHIAZIDE 12.5 MG/1
12.5 TABLET ORAL DAILY
Qty: 90 TABLET | Refills: 0 | Status: SHIPPED | OUTPATIENT
Start: 2025-05-20

## 2025-05-20 RX ORDER — KETOCONAZOLE 20 MG/G
CREAM TOPICAL 2 TIMES DAILY
COMMUNITY
Start: 2025-04-02

## 2025-05-20 RX ORDER — KETOCONAZOLE 20 MG/ML
SHAMPOO, SUSPENSION TOPICAL
COMMUNITY
Start: 2025-04-02

## 2025-05-23 ENCOUNTER — MED REC SCAN ONLY (OUTPATIENT)
Facility: CLINIC | Age: 65
End: 2025-05-23

## 2025-05-29 RX ORDER — CITALOPRAM HYDROBROMIDE 40 MG/1
40 TABLET ORAL DAILY
Qty: 90 TABLET | Refills: 0 | Status: SHIPPED | OUTPATIENT
Start: 2025-05-29

## 2025-05-29 RX ORDER — LOSARTAN POTASSIUM 25 MG/1
25 TABLET ORAL DAILY
Qty: 90 TABLET | Refills: 0 | Status: SHIPPED | OUTPATIENT
Start: 2025-05-29

## 2025-06-11 ENCOUNTER — HOSPITAL ENCOUNTER (OUTPATIENT)
Dept: MAMMOGRAPHY | Age: 65
Discharge: HOME | End: 2025-06-11
Attending: FAMILY MEDICINE
Payer: COMMERCIAL

## 2025-06-11 DIAGNOSIS — Z12.31 BREAST CANCER SCREENING BY MAMMOGRAM: ICD-10-CM

## 2025-06-11 PROCEDURE — 77067 SCR MAMMO BI INCL CAD: CPT | Performed by: FAMILY MEDICINE

## 2025-06-11 PROCEDURE — 77063 BREAST TOMOSYNTHESIS BI: CPT | Performed by: FAMILY MEDICINE

## 2025-07-02 ENCOUNTER — PATIENT MESSAGE (OUTPATIENT)
Facility: CLINIC | Age: 65
End: 2025-07-02

## 2025-07-02 LAB — AMB EXT GMI: 7.8 %

## 2025-07-02 NOTE — TELEPHONE ENCOUNTER
Encounter created to update GMI on record.  Recent GMI on Dexcom below, for the last 30 days.  External result updated.    No results found for: \"GMI\"

## 2025-07-24 RX ORDER — ROSUVASTATIN CALCIUM 5 MG/1
5 TABLET, COATED ORAL NIGHTLY
Qty: 90 TABLET | Refills: 0 | Status: SHIPPED | OUTPATIENT
Start: 2025-07-24

## 2025-07-24 NOTE — TELEPHONE ENCOUNTER
Future Appointments   Date Time Provider Department Center   7/25/2025 10:30 AM Nalini Shields APN [facility]   8/13/2025  7:20 AM Lino Nieto MD [facility]

## 2025-07-24 NOTE — TELEPHONE ENCOUNTER
Please let patient or caregiver know or leave message that refill request for the medication/s listed below has/have come to our office. The refills have been sent.    It appears the patient is due for a yearly physical and fasting labs. Please help schedule an appointment in the next month or so.  Thanks      Requested Prescriptions     Signed Prescriptions Disp Refills    ROSUVASTATIN 5 MG Oral Tab 90 tablet 0     Sig: TAKE 1 TABLET (5 MG TOTAL) BY MOUTH NIGHTLY.     Authorizing Provider: ISABELLA GARDINER

## 2025-07-25 ENCOUNTER — TELEMEDICINE (OUTPATIENT)
Facility: CLINIC | Age: 65
End: 2025-07-25
Payer: COMMERCIAL

## 2025-07-25 ENCOUNTER — MED REC SCAN ONLY (OUTPATIENT)
Facility: CLINIC | Age: 65
End: 2025-07-25

## 2025-07-25 DIAGNOSIS — E78.5 HYPERLIPIDEMIA ASSOCIATED WITH TYPE 2 DIABETES MELLITUS (HCC): ICD-10-CM

## 2025-07-25 DIAGNOSIS — E11.65 TYPE 2 DIABETES MELLITUS WITH HYPERGLYCEMIA, WITHOUT LONG-TERM CURRENT USE OF INSULIN (HCC): Primary | ICD-10-CM

## 2025-07-25 DIAGNOSIS — E11.69 HYPERLIPIDEMIA ASSOCIATED WITH TYPE 2 DIABETES MELLITUS (HCC): ICD-10-CM

## 2025-07-25 LAB — AMB EXT GMI: 7.8 %

## 2025-07-25 PROCEDURE — 95251 CONT GLUC MNTR ANALYSIS I&R: CPT

## 2025-07-25 PROCEDURE — 98006 SYNCH AUDIO-VIDEO EST MOD 30: CPT

## 2025-07-25 RX ORDER — INSULIN DEGLUDEC 100 U/ML
18 INJECTION, SOLUTION SUBCUTANEOUS NIGHTLY
COMMUNITY
Start: 2025-07-25

## 2025-07-25 RX ORDER — TACROLIMUS 1 MG/G
1 OINTMENT TOPICAL 2 TIMES DAILY
COMMUNITY
Start: 2025-07-23

## 2025-07-25 RX ORDER — DOXYCYCLINE 100 MG/1
100 CAPSULE ORAL 2 TIMES DAILY
COMMUNITY
Start: 2025-07-23

## 2025-07-25 NOTE — PROGRESS NOTES
[facility] Note - Telemedicine    Madison Muñoz verbally consents to a telehealth visit (video + audio) on 7/25/2025. The visit was conducted in a private room. Patient understands and accepts financial responsibility for any deductible, co-insurance and/or co-pays associated with this service.         The following individual(s) verbally consented to be recorded using ambient AI listening technology and understand that they can each withdraw their consent to this listening technology at any point by asking the clinician to turn off or pause the recording:    Patient name: Madison Muñoz     Subjective:   History of Present Illness  Madison Muñoz is a 65 year old female with diabetes who presents for follow-up on her glucose management and skin rash.    Hyperglycemia and glucose management  - Diabetes managed with Metformin 1000 mg twice daily, Tresiba 14 units daily, and Novolog 18-20 units daily (occasionally misses lunchtime dose)  - Uses Dexcom continuous glucose monitor with time in range of 49%  - Significant glucose spike after consuming cornflakes and strawberries for breakfast, which is not her usual meal  - Estimates taking 6-7 units of Novolog per meal  - Recent vacation with increased intake of high-fat foods such as cheeseburgers and prime rib, potentially impacting glucose control    Pruritic and painful lower extremity rash  - Rash on leg; dermatologist suspects possible sarcoidosis and performed a biopsy, also started on abx  - Rash on the surrounding tissue is pruritic and painful, and there is an open wound covered with a Band-Aid  - Current treatment includes topical cream, tacrolimus ointment, and doxycycline twice daily since Wednesday without improvement    DM meds at office visit:   Diabetic Medications              METFORMIN  MG Oral Tablet 24 Hr TAKE TWO TABLETS BY MOUTH TWICE DAILY WITH MEALS    insulin degludec (TRESIBA FLEXTOUCH) 100 UNIT/ML Subcutaneous  Solution Pen-injector Inject 14 Units into the skin nightly. Actively titrating per endo instruction, max TDD 20u.    Taking 14u daily      insulin aspart (NOVOLOG FLEXPEN) 100 Units/mL Subcutaneous Solution Pen-injector Take 4u with meals plus 1u for every 50 points over 150 three times daily before meals. Max TDD 30u    Sometimes missed the lunchtime dose   Takes approx 6-7u per meal, so 18-20u/day      check your blood sugar before you eat breafkast, lunch and dinner. Based on the number, take a dose of novolog   Take 1 unit for every 50 points starting at 140  <80- No insuln before the meal  - Take 4u before the meal  141-190= 5 unit of insulin  191-240= 6 units of insulin  241-290= 7 units of insulin  291-340= 8 units of insulin          Continuous Glucose Monitoring Interpretation  Madison Muñoz has undergone continuous glucose monitoring.  The blood glucose tracings were evaluated for two weeks prior to office visit. Blood glucose tracings demonstrated areas of hyperglycemia post-meal, particularly post-breakfast. There were no areas of hypoglycemia notedduring the weeks of evaluation.        History/Other:    Allergies, PMH, SocHx and FHx reviewed and updated as appropriate in Epic on Current Medications[1]  Allergies[2]  Current Medications[3]  Past Medical History[4]  Family History[5]  Social history: Reviewed.      ROS/Exam    REVIEW OF SYSTEMS: Ten point review of systems has been performed and is otherwise negative and/or non-contributory, except as described above.     VITALS  There were no vitals filed for this visit.    There is no height or weight on file to calculate BMI.  Wt Readings from Last 6 Encounters:   05/12/25 229 lb 6.4 oz (104.1 kg)   03/25/25 229 lb 8 oz (104.1 kg)   03/24/25 224 lb 3.2 oz (101.7 kg)   02/12/25 237 lb 3.2 oz (107.6 kg)   01/26/25 236 lb (107 kg)   08/26/24 200 lb (90.7 kg)       PHYSICAL EXAM  Limited due to telemedicine encounter    Constitutional Not in  acute distress  Pulmonary: no wheezing heard  No coughing on the phone. Speaking in full sentences   Neurological:  Alert and oriented to person, place and time.   Psychiatric: Normal affect, mood and behavior appropriate  Skin: erythematous patch on RLE     Labs/Imaging: Pertinent imaging reviewed.    Thyroid labs (if present in chart):  Recent Labs     05/18/23  0932 12/08/23  0830 07/24/24  0900 02/12/25  0802   T4F 1.4 1.2  --  1.4   TSH 0.131* 0.566 1.238 1.025        Thyroid ultrasound results (if present in chart):  No results found.    Overall glucose control:  Lab Results   Component Value Date    A1C 9.1 (A) 05/12/2025    A1C 10.3 (H) 02/12/2025    A1C 6.6 (H) 07/24/2024    A1C 6.3 (H) 12/08/2023    A1C 6.1 (H) 11/21/2023       Supplementary Documentation:   -Surveillance for Diabetes Complications & Risks  Foot exam/neuropathy: Last Foot Exam: 02/12/25    Retinopathy screening: No data recordedNo data recorded - due, reminded      Lipid screening:   Labs were prior to starting statin  Lab Results   Component Value Date     (H) 02/12/2025    TRIG 297 (H) 02/12/2025   Cholesterol Meds: rosuvastatin Tabs - 5 MG      Nephropathy screening:   Lab Results   Component Value Date    EGFRCR 98 02/12/2025    MICROALBCREA 9.7 02/12/2025   No results found for: \"CREAUR\", \"MICROALBUMIN\", \"MALBCREACALC\"    Blood pressure control: unable to assess, video visit   BP Readings from Last 1 Encounters:   05/12/25 150/72   BP Meds: hydroCHLOROthiazide Tabs - 12.5 MG; losartan Tabs - 25 MG       Assessment & Plan:   Overview:   1. Type 2 diabetes mellitus with hyperglycemia, without long-term current use of insulin (HCC) (Primary)  Overview:  Established with [facility] (Marti EARLY) in March 2023:  Prev pt at [facility], insurance changed  Diagnosed with diabetes at age 51  Pt denies hx of hospitalization for DM and/or pancreatitis   Family history of DM: no fam hx  Chronic yeast infection on  SGLT2i    PLAN:  Intolerant to several type 2 diabetes medications. She saw great success with weight loss and diabetes management with keto diet however A1C worsening in Feb 2025 due to diet changes. History of sarcoidosis. ALFONSO ab evaluated, negative. Several diabetes med intolerances as noted below.    Previously trialed/failed DM meds (hence using insulin for the time being):   -Acarbose 25mg three times daily  -  Trulicity - intolerant due to N/V  - glimepiride- stopped to start other meds  - jardiance 25mg- stopped d/t recurrent yeast infections  - ozempic - bad GI side effects  - mounjaro- nausea/vomiting (Feb 2025)  Orders:  -     GLUC MNTR CONT REC FROM NTRSTL TISS FLU PHYS I&R      Assessment & Plan  Type 2 diabetes mellitus with hyperglycemia  Blood glucose improved with GMI estimated to be 7.8% after starting prandial insulin and CGM. Dexcom shows 49% time in range. She is spiking postprandially and FBG is not to goal- will start with basal insulin titration and check in in about ~1 week, can make further adjustment PRN  - Increase Tresiba 14u --> 18 units.  - Maintain current Novolog sliding scale (see below).  - Monitor blood glucose via Dexcom G7, test minimum 3x/day before meals  - Reassess blood glucose in one week.    - once hyperglycemia resolves some, could consider transition to TZD or ESTRELLA class to re-trial orals, discuss next office visit   - see above overview for further diabetes hx, see above header \"Supplementary Documentation\" for surveillance for diabetes complications & risks    Previously trialed/failed DM meds (hence using insulin for the time being):   -ozempic (tried x2)  -trulicity-  nausea, loss of appetite, vomiting  -mounjaro- vomiting  - jardiance- ongoing yeast infection    Hyperlipidemia  Started on rosuvastatin, no significant side effects.  Plans to follow up with primary care provider mid-August.  - Continue rosuvastatin as prescribed.       Updated DM med list:   Diabetic  Medications              insulin degludec (TRESIBA FLEXTOUCH) 100 UNIT/ML Subcutaneous Solution Pen-injector (Taking) Inject 18 Units into the skin nightly.    METFORMIN  MG Oral Tablet 24 Hr TAKE TWO TABLETS BY MOUTH TWICE DAILY WITH MEALS    insulin aspart (NOVOLOG FLEXPEN) 100 Units/mL Subcutaneous Solution Pen-injector Take 4u with meals plus 1u for every 50 points over 150 three times daily before meals. Max TDD 30u    check your blood sugar before you eat breakfast, lunch and dinner. Based on the number, take a dose of novolog   Take 1 unit for every 50 points starting at 140  <80- No insuln before the meal  - Take 4u before the meal  141-190= 5 unit of insulin  191-240= 6 units of insulin  241-290= 7 units of insulin  291-340= 8 units of insulin           Return in about 2 months (around 9/25/2025) for can be video visit.    NNEKA Cook  [facility] Endocrinology, Diabetes & Metabolism   7/25/2025            [1]    insulin degludec (TRESIBA FLEXTOUCH) 100 UNIT/ML Subcutaneous Solution Pen-injector Inject 18 Units into the skin nightly.      doxycycline 100 MG Oral Cap Take 1 capsule (100 mg total) by mouth 2 (two) times daily.      tacrolimus 0.1 % External Ointment Apply 1 Application topically 2 (two) times daily.     [2]   Allergies  Allergen Reactions    Amoxicillin-Pot Clavulanate NAUSEA ONLY and RASH    Nitroglycerin HYPOTENSION and OTHER (SEE COMMENTS)     \"reacts too fast\" - per pt  Syncope in ER      Tramadol RASH and NAUSEA ONLY     Also double vision      Lipitor [Atorvastatin] MYALGIA     Possibly cause her some chest muscle pain. Has Sarcoid which could have also caused similar symptoms    Ozempic (0.25 Or 0.5 Mg-Dose) [Semaglutide] NAUSEA AND VOMITING    Diazepam OTHER (SEE COMMENTS)     Prolonged effect to diazepam 10 (per pt)  Loss of memory, severe sedation     [3]   Current Outpatient Medications   Medication Sig Dispense Refill    insulin degludec (TRESIBA FLEXTOUCH)  100 UNIT/ML Subcutaneous Solution Pen-injector Inject 18 Units into the skin nightly.      doxycycline 100 MG Oral Cap Take 1 capsule (100 mg total) by mouth 2 (two) times daily.      tacrolimus 0.1 % External Ointment Apply 1 Application topically 2 (two) times daily.      ROSUVASTATIN 5 MG Oral Tab TAKE 1 TABLET (5 MG TOTAL) BY MOUTH NIGHTLY. 90 tablet 0    LOSARTAN 25 MG Oral Tab TAKE ONE TABLET BY MOUTH DAILY 90 tablet 0    CITALOPRAM 40 MG Oral Tab TAKE ONE TABLET BY MOUTH DAILY 90 tablet 0    HYDROCHLOROTHIAZIDE 12.5 MG Oral Tab Take 1 tablet (12.5 mg total) by mouth daily. 90 tablet 0    METFORMIN  MG Oral Tablet 24 Hr TAKE TWO TABLETS BY MOUTH TWICE DAILY WITH MEALS 360 tablet 0    ketoconazole 2 % External Cream Apply topically 2 (two) times daily.      ketoconazole 2 % External Shampoo APPLY ONE APPLICATION TOPICALLY THREE TIMES A WEEK. APPLY TO AFFECTED AREAS, LET SIT FOR A 5-10 MINUTES, THEN RINSE      insulin aspart (NOVOLOG FLEXPEN) 100 Units/mL Subcutaneous Solution Pen-injector Take 4u with meals plus 1u for every 50 points over 150 three times daily before meals. Max TDD 30u 30 mL 2    Insulin Pen Needle (BD PEN NEEDLE ADEEL U/F) 32G X 4 MM Does not apply Misc Inject 1 Pen into the skin in the morning, at noon, in the evening, and at bedtime. 150 each 5    Continuous Glucose Sensor (DEXCOM G7 SENSOR) Does not apply Misc 1 each Every 10 days. 9 each 3    fluconazole (DIFLUCAN) 150 MG Oral Tab 1 pill PO QOD for 3 doses 3 tablet 0    levothyroxine 88 MCG Oral Tab Take 1 tablet (88 mcg total) by mouth daily. 90 tablet 3    omeprazole 20 MG Oral Capsule Delayed Release Take 1 capsule (20 mg total) by mouth daily. 90 capsule 3    Glucose Blood (CONTOUR NEXT TEST) In Vitro Strip Use twice daily to check blood sugar 200 strip 3    clobetasol 0.05 % External Ointment Apply 1 Application topically at bedtime. Apply a thin layer to affected area at HS nightly for 12 weeks (Patient not taking: Reported on  2025) 30 g 0    cetirizine 10 MG Oral Tab Take 1 tablet (10 mg total) by mouth daily.      Magnesium-Potassium 250-100 MG Oral Tab Take 1 tablet by mouth daily.     [4]   Past Medical History:   Allergic rhinitis    COVID    S/S: cough, cold, aches, pneumonia - no hospitalization     Diverticulosis    Seen on screening colonoscopy    DM2 (diabetes mellitus, type 2) (HCC)    Essential hypertension    Fatty liver    Incidental finding on CT chest 2020 at [facility]    GERD (gastroesophageal reflux disease)    History of blood transfusion    during      Hyperlipidemia    Hypertension    Hypothyroid    Osteoarthritis    UPPER BACK     Sarcoid    Visual impairment    GLASSES   [5]   Family History  Problem Relation Age of Onset    Kidney Disease Mother     Hypertension Mother     Other (CHF) Mother     Other (Aortic aneurysm) Father     Heart Disease Brother     Other (Leukemia) Brother 72    No Known Problems Brother     No Known Problems Son

## 2025-07-25 NOTE — PATIENT INSTRUCTIONS
Return Visit:  With NNEKA Cook: Return in about 2 months (around 9/25/2025) for can be video visit.            Contains text generated by Kathrine     Updated diabetes medication instructions from today:   Diabetic Medications              insulin degludec (TRESIBA FLEXTOUCH) 100 UNIT/ML Subcutaneous Solution Pen-injector (Taking) Inject 18 Units into the skin nightly.    METFORMIN  MG Oral Tablet 24 Hr TAKE TWO TABLETS BY MOUTH TWICE DAILY WITH MEALS        insulin aspart (NOVOLOG FLEXPEN) 100 Units/mL Subcutaneous Solution Pen-injector Take 4u with meals plus 1u for every 50 points over 150 three times daily before meals. Max TDD 30u     check your blood sugar before you eat breafkast, lunch and dinner. Based on the number, take a dose of novolog   Take 1 unit for every 50 points starting at 140  <80- No insuln before the meal  - Take 4u before the meal  141-190= 5 unit of insulin  191-240= 6 units of insulin  241-290= 7 units of insulin  291-340= 8 units of insulin  341-390= 9 units of insulin  391-440= 10 units of insulin             Lab Results   Component Value Date    A1C 9.1 (A) 05/12/2025    A1C 10.3 (H) 02/12/2025    A1C 6.6 (H) 07/24/2024    A1C 6.3 (H) 12/08/2023    A1C 6.1 (H) 11/21/2023        General follow up information:  Please let us know if you require any refills at least 1 week prior to your medication running out. If you do run out of medication, please call our office ASAP to request refills (do not wait until your follow up).   Please call our office if sugars at home are consistently greater than 250 or less than 70 for medication adjustment (do not wait until your follow up appointment).  Lab results and imaging will typically be reviewed at follow up appointments, or within 3-5 business days of ALL results being in if you do not have an appointment scheduled in the near future. Our office will contact you for any abnormal results requiring more urgent follow up or  action.   The on-call pager is for urgent matters only. If you are a type 1 diabetic and run out of insulin after business hours 8AM-4PM, you may call the on-call pager for a refill to a 24 hour pharmacy.  If you have adrenal insufficiency and run out of steroids, you may call the on-call pager for a refill to a 24 hour pharmacy. All other refill requests should be requested during business hours.    Office phone number: [phone]; phones are open Monday-Friday 8:30-4:30.       HOW TO TREAT LOW BLOOD SUGAR (Hypoglycemia)  Low blood sugar= Less than 70, or if you start to have symptoms (below)  Symptoms: Shaking or trembling, fast heart rate, extreme hunger, sweating, confusion/difficulty concentrating, dizziness.    How to treat a low blood sugar if you are able to eat/drink: The Rule of 15/15  If you are using continuous glucose monitor that says you are low, but you do not have any symptoms, verify on fingerstick that your blood sugar is actually low before treating.   Eat 15 grams of carbs (see examples below)  Check your blood sugar after 15 minutes. If it’s still below your target range, have another serving.   Repeat these steps until it’s in your target range. Once it’s in range, if you're nervous about your sugar going low again, have a protein source (ie, a spoonful of peanut butter).   If you have a CGM you want to look for how your arrow has changed. If you arrow is pointed up or sideways after 15 min, give your CGM more time OR check with a finger stick. Try not to eat more food until at least 15 min after the first BG check - otherwise you risk having a rebound high.  If you are experiencing symptoms and you are unable to check your blood glucose for any reason, treat the hypoglycemia.  If someone has a low blood sugar and is unconscious: Don’t hesitate to call 911. If someone is unconscious and glucagon is not available or someone does not know how to use it, call 911 immediately.     To treat a  low, I recommend you carry with you easy, pre-portioned treatment for low blood sugars that are 15G of carbs:   - Children sized squeeze pouch applesauce (high fiber + carbs help prevent too high of a spike)  - Small children's sized juicebox- 15g carb --> 4oz juice box  - Glucose tablets from [employer], you can find them near diabetes supplies --> Note, you will need to eat 3-4 tablets to get to 15g of carbs  - Children sized fruit snack pack- look for one with 15 grams of total carbohydrate  - Choice of how to treat your low is important. Complex carbs, or foods that contain fats along with carbs (like chocolate) can slow the absorption of glucose and should NOT be used to treat an emergency low

## 2025-08-05 DIAGNOSIS — E11.9 TYPE 2 DIABETES MELLITUS WITHOUT COMPLICATION, WITHOUT LONG-TERM CURRENT USE OF INSULIN (HCC): ICD-10-CM

## 2025-08-05 RX ORDER — HYDROCHLOROTHIAZIDE 12.5 MG/1
12.5 TABLET ORAL DAILY
Qty: 90 TABLET | Refills: 0 | Status: SHIPPED | OUTPATIENT
Start: 2025-08-05

## 2025-08-05 RX ORDER — METFORMIN HYDROCHLORIDE 500 MG/1
1000 TABLET, EXTENDED RELEASE ORAL 2 TIMES DAILY WITH MEALS
Qty: 360 TABLET | Refills: 0 | Status: SHIPPED | OUTPATIENT
Start: 2025-08-05

## 2025-08-13 ENCOUNTER — OFFICE VISIT (OUTPATIENT)
Dept: FAMILY MEDICINE CLINIC | Facility: CLINIC | Age: 65
End: 2025-08-13

## 2025-08-13 VITALS
BODY MASS INDEX: 41.66 KG/M2 | OXYGEN SATURATION: 98 % | DIASTOLIC BLOOD PRESSURE: 72 MMHG | TEMPERATURE: 98 F | HEIGHT: 64 IN | WEIGHT: 244 LBS | HEART RATE: 75 BPM | SYSTOLIC BLOOD PRESSURE: 126 MMHG

## 2025-08-13 DIAGNOSIS — E78.2 MIXED HYPERLIPIDEMIA: ICD-10-CM

## 2025-08-13 DIAGNOSIS — I10 HYPERTENSION, UNSPECIFIED TYPE: ICD-10-CM

## 2025-08-13 DIAGNOSIS — E03.9 HYPOTHYROIDISM, UNSPECIFIED TYPE: ICD-10-CM

## 2025-08-13 DIAGNOSIS — K21.9 GASTROESOPHAGEAL REFLUX DISEASE, UNSPECIFIED WHETHER ESOPHAGITIS PRESENT: ICD-10-CM

## 2025-08-13 DIAGNOSIS — K76.0 FATTY LIVER: ICD-10-CM

## 2025-08-13 DIAGNOSIS — E11.65 TYPE 2 DIABETES MELLITUS WITH HYPERGLYCEMIA, WITHOUT LONG-TERM CURRENT USE OF INSULIN (HCC): ICD-10-CM

## 2025-08-13 DIAGNOSIS — Z00.00 WELL ADULT EXAM: Primary | ICD-10-CM

## 2025-08-13 DIAGNOSIS — F41.9 ANXIETY: ICD-10-CM

## 2025-08-13 DIAGNOSIS — D86.9 SARCOID: ICD-10-CM

## 2025-08-13 LAB
ALT SERPL-CCNC: 29 U/L (ref 10–49)
ANION GAP SERPL CALC-SCNC: 12 MMOL/L (ref 0–18)
AST SERPL-CCNC: 43 U/L (ref ?–34)
BUN BLD-MCNC: 11 MG/DL (ref 9–23)
CALCIUM BLD-MCNC: 9.6 MG/DL (ref 8.7–10.6)
CHLORIDE SERPL-SCNC: 105 MMOL/L (ref 98–112)
CHOLEST SERPL-MCNC: 139 MG/DL (ref ?–200)
CO2 SERPL-SCNC: 25 MMOL/L (ref 21–32)
CREAT BLD-MCNC: 0.68 MG/DL (ref 0.55–1.02)
EGFRCR SERPLBLD CKD-EPI 2021: 97 ML/MIN/1.73M2 (ref 60–?)
ERYTHROCYTE [DISTWIDTH] IN BLOOD BY AUTOMATED COUNT: 14 %
EST. AVERAGE GLUCOSE BLD GHB EST-MCNC: 192 MG/DL (ref 68–126)
FASTING PATIENT LIPID ANSWER: YES
FASTING STATUS PATIENT QL REPORTED: YES
GLUCOSE BLD-MCNC: 173 MG/DL (ref 70–99)
HBA1C MFR BLD: 8.3 % (ref ?–5.7)
HCT VFR BLD AUTO: 36.9 % (ref 35–48)
HDLC SERPL-MCNC: 45 MG/DL (ref 40–59)
HGB BLD-MCNC: 11.6 G/DL (ref 12–16)
LDLC SERPL CALC-MCNC: 69 MG/DL (ref ?–100)
MCH RBC QN AUTO: 26.9 PG (ref 26–34)
MCHC RBC AUTO-ENTMCNC: 31.4 G/DL (ref 31–37)
MCV RBC AUTO: 85.6 FL (ref 80–100)
NONHDLC SERPL-MCNC: 94 MG/DL (ref ?–130)
OSMOLALITY SERPL CALC.SUM OF ELEC: 298 MOSM/KG (ref 275–295)
PLATELET # BLD AUTO: 186 10(3)UL (ref 150–450)
POTASSIUM SERPL-SCNC: 4.1 MMOL/L (ref 3.5–5.1)
RBC # BLD AUTO: 4.31 X10(6)UL (ref 3.8–5.3)
SODIUM SERPL-SCNC: 142 MMOL/L (ref 136–145)
T4 FREE SERPL-MCNC: 1.5 NG/DL (ref 0.8–1.7)
TRIGL SERPL-MCNC: 143 MG/DL (ref 30–149)
TSI SER-ACNC: 1.42 UIU/ML (ref 0.55–4.78)
VLDLC SERPL CALC-MCNC: 22 MG/DL (ref 0–30)
WBC # BLD AUTO: 6.4 X10(3) UL (ref 4–11)

## 2025-08-13 PROCEDURE — 84439 ASSAY OF FREE THYROXINE: CPT | Performed by: FAMILY MEDICINE

## 2025-08-13 PROCEDURE — 84443 ASSAY THYROID STIM HORMONE: CPT | Performed by: FAMILY MEDICINE

## 2025-08-13 PROCEDURE — 99396 PREV VISIT EST AGE 40-64: CPT | Performed by: FAMILY MEDICINE

## 2025-08-13 PROCEDURE — 80061 LIPID PANEL: CPT | Performed by: FAMILY MEDICINE

## 2025-08-13 PROCEDURE — 80048 BASIC METABOLIC PNL TOTAL CA: CPT | Performed by: FAMILY MEDICINE

## 2025-08-13 PROCEDURE — 84450 TRANSFERASE (AST) (SGOT): CPT | Performed by: FAMILY MEDICINE

## 2025-08-13 PROCEDURE — 85027 COMPLETE CBC AUTOMATED: CPT | Performed by: FAMILY MEDICINE

## 2025-08-13 PROCEDURE — 84460 ALANINE AMINO (ALT) (SGPT): CPT | Performed by: FAMILY MEDICINE

## 2025-08-13 PROCEDURE — 92229 IMG RTA DETC/MNTR DS POC ALY: CPT | Performed by: FAMILY MEDICINE

## 2025-08-13 PROCEDURE — 83036 HEMOGLOBIN GLYCOSYLATED A1C: CPT | Performed by: FAMILY MEDICINE

## 2025-08-13 RX ORDER — FLUTICASONE PROPIONATE AND SALMETEROL 250; 50 UG/1; UG/1
1 POWDER RESPIRATORY (INHALATION) 2 TIMES DAILY
Qty: 1 EACH | Refills: 5 | Status: SHIPPED | OUTPATIENT
Start: 2025-08-13

## 2025-08-14 ENCOUNTER — TELEPHONE (OUTPATIENT)
Facility: CLINIC | Age: 65
End: 2025-08-14

## 2025-08-14 DIAGNOSIS — E11.65 TYPE 2 DIABETES MELLITUS WITH HYPERGLYCEMIA, WITHOUT LONG-TERM CURRENT USE OF INSULIN (HCC): Primary | ICD-10-CM

## 2025-08-14 RX ORDER — INSULIN GLARGINE 100 [IU]/ML
INJECTION, SOLUTION SUBCUTANEOUS
Qty: 30 ML | Refills: 1 | Status: SHIPPED | OUTPATIENT
Start: 2025-08-14

## 2025-08-19 RX ORDER — LOSARTAN POTASSIUM 25 MG/1
25 TABLET ORAL DAILY
Qty: 90 TABLET | Refills: 3 | Status: SHIPPED | OUTPATIENT
Start: 2025-08-19

## 2025-08-19 RX ORDER — CITALOPRAM HYDROBROMIDE 40 MG/1
40 TABLET ORAL DAILY
Qty: 90 TABLET | Refills: 3 | Status: SHIPPED | OUTPATIENT
Start: 2025-08-19

## 2025-08-20 ENCOUNTER — TELEPHONE (OUTPATIENT)
Dept: FAMILY MEDICINE CLINIC | Facility: CLINIC | Age: 65
End: 2025-08-20

## 2025-08-22 ENCOUNTER — LAB ENCOUNTER (OUTPATIENT)
Dept: LAB | Age: 65
End: 2025-08-22
Attending: FAMILY MEDICINE

## 2025-08-22 DIAGNOSIS — D64.9 ANEMIA, UNSPECIFIED TYPE: ICD-10-CM

## 2025-08-22 LAB
BASOPHILS # BLD AUTO: 0.05 X10(3) UL (ref 0–0.2)
BASOPHILS NFR BLD AUTO: 0.7 %
DEPRECATED HBV CORE AB SER IA-ACNC: 44 NG/ML (ref 50–306)
EOSINOPHIL # BLD AUTO: 0.28 X10(3) UL (ref 0–0.7)
EOSINOPHIL NFR BLD AUTO: 3.7 %
ERYTHROCYTE [DISTWIDTH] IN BLOOD BY AUTOMATED COUNT: 13.9 %
FOLATE SERPL-MCNC: 16.9 NG/ML (ref 5.4–?)
HCT VFR BLD AUTO: 38.7 % (ref 35–48)
HGB BLD-MCNC: 12.3 G/DL (ref 12–16)
IMM GRANULOCYTES # BLD AUTO: 0.04 X10(3) UL (ref 0–1)
IMM GRANULOCYTES NFR BLD: 0.5 %
LYMPHOCYTES # BLD AUTO: 2.33 X10(3) UL (ref 1–4)
LYMPHOCYTES NFR BLD AUTO: 30.8 %
MCH RBC QN AUTO: 27.3 PG (ref 26–34)
MCHC RBC AUTO-ENTMCNC: 31.8 G/DL (ref 31–37)
MCV RBC AUTO: 86 FL (ref 80–100)
MONOCYTES # BLD AUTO: 0.45 X10(3) UL (ref 0.1–1)
MONOCYTES NFR BLD AUTO: 5.9 %
NEUTROPHILS # BLD AUTO: 4.42 X10 (3) UL (ref 1.5–7.7)
NEUTROPHILS # BLD AUTO: 4.42 X10(3) UL (ref 1.5–7.7)
NEUTROPHILS NFR BLD AUTO: 58.4 %
PLATELET # BLD AUTO: 235 10(3)UL (ref 150–450)
RBC # BLD AUTO: 4.5 X10(6)UL (ref 3.8–5.3)
VIT B12 SERPL-MCNC: 279 PG/ML (ref 211–911)
WBC # BLD AUTO: 7.6 X10(3) UL (ref 4–11)

## 2025-08-22 PROCEDURE — 82607 VITAMIN B-12: CPT

## 2025-08-22 PROCEDURE — 82746 ASSAY OF FOLIC ACID SERUM: CPT

## 2025-08-22 PROCEDURE — 82728 ASSAY OF FERRITIN: CPT

## 2025-08-22 PROCEDURE — 36415 COLL VENOUS BLD VENIPUNCTURE: CPT

## 2025-08-22 PROCEDURE — 85025 COMPLETE CBC W/AUTO DIFF WBC: CPT

## (undated) DEVICE — STERILE SYNTHETIC POLYISOPRENE POWDER-FREE SURGICAL GLOVES WITH HYDROGEL COATING: Brand: PROTEXIS

## (undated) DEVICE — SLEEVE KENDALL SCD EXPRESS MED

## (undated) DEVICE — SUT VICRYL 3-0 SH J416H

## (undated) DEVICE — SOLUTION  .9 1000ML BTL

## (undated) DEVICE — ADHESIVE MASTISOL 2/3ML

## (undated) DEVICE — 3M™ STERI-STRIP™ REINFORCED ADHESIVE SKIN CLOSURES, R1547, 1/2 IN X 4 IN (12 MM X 100 MM), 6 STRIPS/ENVELOPE: Brand: 3M™ STERI-STRIP™

## (undated) DEVICE — SUT MONOCRYL 4-0 PS-2 Y496G

## (undated) DEVICE — VIOLET BRAIDED (POLYGLACTIN 910), SYNTHETIC ABSORBABLE SUTURE: Brand: COATED VICRYL

## (undated) DEVICE — LAPAROTOMY CDS: Brand: MEDLINE INDUSTRIES, INC.

## (undated) DEVICE — 3M™ TEGADERM™ TRANSPARENT FILM DRESSING, 1626W, 4 IN X 4-3/4 IN (10 CM X 12 CM), 50 EACH/CARTON, 4 CARTON/CASE: Brand: 3M™ TEGADERM™

## (undated) DEVICE — BINDER ABDOMINAL 0-45IN 3PANEL

## (undated) DEVICE — GAUZE SPONGES,USP TYPE VII GAUZE, 12 PLY: Brand: CURITY

## (undated) DEVICE — LIGHT HANDLE

## (undated) DEVICE — SUT ETHIBOND 2-0 CT-2 X411H

## (undated) DEVICE — SUT ETHIBOND 2-0 SH CX12D

## (undated) NOTE — LETTER
2021      RE: Cornelio Severe  : 1960      To Whom it May Concern,      Cornelio Severe was seen in office 2021 due to a medical condition. She must be off work from 2021 through 2021 in order to recuperate.         Regards,

## (undated) NOTE — LETTER
2021      RE: Angely Wright  : 1960      To Whom it May Concern,      Angely Wright has a Sarcoidosis flare which affects her breathing and stamina. She should be off work 2021 through 2021 in order to recuperate.         Regards

## (undated) NOTE — Clinical Note
Danielle Lozano saw Tyrone Sethi in the office, she complains of abdominal pain to the left of midline. She underwent umbilical herniorrhaphy a few weeks ago. She is healed well from her surgery. I do not think this pain is related to her surgery and it is as it is not in proximity to the location of her mesh or repair I have advised that she continue to observe the area I will follow her up in 1 month's time if the pain persists I will do imaging.   Thank you Sepideh Mccormick

## (undated) NOTE — LETTER
Date & Time: 8/26/2024, 11:22 AM  Patient: Madison Muñoz  Encounter Provider(s):    Adryan Vincent MD       To Whom It May Concern:    Madison Muñoz was seen and treated in our department on 8/26/2024. She should not return to work until 9/30 .    If you have any questions or concerns, please do not hesitate to call.        _____________________________  Physician/APC Signature

## (undated) NOTE — Clinical Note
Hello!  I had the pleasure of seeing your patient, Maia Fisher, today in the office and have routed the progress note to you for your review. Thank you for the referral and please contact me if you have any questions.     Sincerely,    Vik Siegel

## (undated) NOTE — LETTER
Date: 3/22/2021    Patient Name: Neda Woodall          To Whom it may concern: This letter has been written at the patient's request. The above patient was seen at the [facility] for treatment of a medical condition.     This patient nancy

## (undated) NOTE — LETTER
02/08/21          Madison Muñoz   [address]  [address] [facility]           Dear Zuri Bernal records indicate that you have outstanding lab work and or testing that was ordered for you and has not yet been completed: Please call [place]

## (undated) NOTE — Clinical Note
I saw Itzel Wang in the office today. She presents with a ventral hernia. I am planning ventral herniorrhaphy with mesh. She is currently scheduled.   Thank you Yadira Pratt

## (undated) NOTE — LETTER
7/10/2021      RE: Evita Faulkner  : 1960      To Whom it May Concern,      Evita Faulkner must be allowed to have a water bottle at her work station. This is in order to avoid dehydration which would be detrimental to her medical conditions.

## (undated) NOTE — LETTER
Date & Time: 8/26/2024, 11:22 AM  Patient: Madison Muñoz  Encounter Provider(s):    Adryan Vincent MD       To Whom It May Concern:    Madison Muñoz was seen and treated in our department on 8/26/2024. She should not return to work until 8/30 .    If you have any questions or concerns, please do not hesitate to call.        _____________________________  Physician/APC Signature